# Patient Record
Sex: FEMALE | Race: ASIAN | NOT HISPANIC OR LATINO | Employment: OTHER | URBAN - METROPOLITAN AREA
[De-identification: names, ages, dates, MRNs, and addresses within clinical notes are randomized per-mention and may not be internally consistent; named-entity substitution may affect disease eponyms.]

---

## 2017-02-20 ENCOUNTER — ONCOLOGY VISIT (OUTPATIENT)
Dept: ONCOLOGY | Facility: CLINIC | Age: 67
End: 2017-02-20
Attending: INTERNAL MEDICINE
Payer: COMMERCIAL

## 2017-02-20 ENCOUNTER — APPOINTMENT (OUTPATIENT)
Dept: LAB | Facility: CLINIC | Age: 67
End: 2017-02-20
Attending: INTERNAL MEDICINE
Payer: COMMERCIAL

## 2017-02-20 VITALS
WEIGHT: 140.8 LBS | DIASTOLIC BLOOD PRESSURE: 65 MMHG | SYSTOLIC BLOOD PRESSURE: 99 MMHG | TEMPERATURE: 97.6 F | HEART RATE: 60 BPM | OXYGEN SATURATION: 98 % | BODY MASS INDEX: 28.44 KG/M2 | RESPIRATION RATE: 18 BRPM

## 2017-02-20 DIAGNOSIS — C83.30 DLBCL (DIFFUSE LARGE B CELL LYMPHOMA) (H): ICD-10-CM

## 2017-02-20 LAB
ALBUMIN SERPL-MCNC: 3.6 G/DL (ref 3.4–5)
ALP SERPL-CCNC: 94 U/L (ref 40–150)
ALT SERPL W P-5'-P-CCNC: 28 U/L (ref 0–50)
ANION GAP SERPL CALCULATED.3IONS-SCNC: 12 MMOL/L (ref 3–14)
AST SERPL W P-5'-P-CCNC: 19 U/L (ref 0–45)
BASOPHILS # BLD AUTO: 0 10E9/L (ref 0–0.2)
BASOPHILS NFR BLD AUTO: 0.4 %
BILIRUB SERPL-MCNC: 1 MG/DL (ref 0.2–1.3)
BUN SERPL-MCNC: 16 MG/DL (ref 7–30)
CALCIUM SERPL-MCNC: 8.2 MG/DL (ref 8.5–10.1)
CHLORIDE SERPL-SCNC: 110 MMOL/L (ref 94–109)
CO2 SERPL-SCNC: 21 MMOL/L (ref 20–32)
CREAT SERPL-MCNC: 0.81 MG/DL (ref 0.52–1.04)
DIFFERENTIAL METHOD BLD: NORMAL
EOSINOPHIL # BLD AUTO: 0.1 10E9/L (ref 0–0.7)
EOSINOPHIL NFR BLD AUTO: 1.7 %
ERYTHROCYTE [DISTWIDTH] IN BLOOD BY AUTOMATED COUNT: 12.8 % (ref 10–15)
GFR SERPL CREATININE-BSD FRML MDRD: 71 ML/MIN/1.7M2
GLUCOSE SERPL-MCNC: 248 MG/DL (ref 70–99)
HCT VFR BLD AUTO: 39.9 % (ref 35–47)
HGB BLD-MCNC: 12.9 G/DL (ref 11.7–15.7)
IMM GRANULOCYTES # BLD: 0 10E9/L (ref 0–0.4)
IMM GRANULOCYTES NFR BLD: 0.2 %
LDH SERPL L TO P-CCNC: 164 U/L (ref 81–234)
LYMPHOCYTES # BLD AUTO: 2.2 10E9/L (ref 0.8–5.3)
LYMPHOCYTES NFR BLD AUTO: 41 %
MCH RBC QN AUTO: 26.8 PG (ref 26.5–33)
MCHC RBC AUTO-ENTMCNC: 32.3 G/DL (ref 31.5–36.5)
MCV RBC AUTO: 83 FL (ref 78–100)
MONOCYTES # BLD AUTO: 0.3 10E9/L (ref 0–1.3)
MONOCYTES NFR BLD AUTO: 5.5 %
NEUTROPHILS # BLD AUTO: 2.7 10E9/L (ref 1.6–8.3)
NEUTROPHILS NFR BLD AUTO: 51.2 %
NRBC # BLD AUTO: 0 10*3/UL
NRBC BLD AUTO-RTO: 0 /100
PLATELET # BLD AUTO: 199 10E9/L (ref 150–450)
POTASSIUM SERPL-SCNC: 3.3 MMOL/L (ref 3.4–5.3)
PROT SERPL-MCNC: 6.7 G/DL (ref 6.8–8.8)
RBC # BLD AUTO: 4.82 10E12/L (ref 3.8–5.2)
SODIUM SERPL-SCNC: 143 MMOL/L (ref 133–144)
WBC # BLD AUTO: 5.3 10E9/L (ref 4–11)

## 2017-02-20 PROCEDURE — 00000402 ZZHCL STATISTIC TOTAL PROTEIN: Performed by: INTERNAL MEDICINE

## 2017-02-20 PROCEDURE — 36415 COLL VENOUS BLD VENIPUNCTURE: CPT

## 2017-02-20 PROCEDURE — 99212 OFFICE O/P EST SF 10 MIN: CPT

## 2017-02-20 PROCEDURE — 83615 LACTATE (LD) (LDH) ENZYME: CPT | Performed by: INTERNAL MEDICINE

## 2017-02-20 PROCEDURE — 84165 PROTEIN E-PHORESIS SERUM: CPT | Performed by: INTERNAL MEDICINE

## 2017-02-20 PROCEDURE — 80053 COMPREHEN METABOLIC PANEL: CPT | Performed by: INTERNAL MEDICINE

## 2017-02-20 PROCEDURE — 85025 COMPLETE CBC W/AUTO DIFF WBC: CPT | Performed by: INTERNAL MEDICINE

## 2017-02-20 PROCEDURE — 99213 OFFICE O/P EST LOW 20 MIN: CPT | Mod: GC | Performed by: INTERNAL MEDICINE

## 2017-02-20 ASSESSMENT — PAIN SCALES - GENERAL: PAINLEVEL: EXTREME PAIN (9)

## 2017-02-20 NOTE — LETTER
2/20/2017       RE: Simran Negro  [address]   [facility] [address]     Dear Colleague,    Thank you for referring your patient, Simran Negro, to the [facility]. Please see a copy of my visit note below.    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   Date of Service: 2/20/2017    HISTORY OF PRESENT ILLNESS:  Ms. Gavin is a 66 year old [facility] female here for follow up of CNS relapse of DLBCL.  She was seen with a [facility]  today.  To briefly summarize her course, she was diagnosed with DLBCL of the cervix in 06/2007 and was treated with 6 cycles of R-CHOP.  She attained a CR, but in early 2014 developed difficulty concentrating, gait instability, and right-sided headaches with some blurry vision.  Workup revealed an intra-axial right parietal mass.  Stereotactic biopsy on 03/19/2014 which was diagnostic for diffuse large B-cell lymphoma, non-germinal center type.  Immunophenotypically, this was similar to her previous diffuse large B-cell lymphoma, and this was presumed CNS relapse.  She was started on MT-R (methotrexate, temozolomide and rituximab) therapy on 3/25/2014.  Her course was complicated by line-related DVT, and she completed a course of enoxaparin for this.  She attained a complete clinical and radiographic response after completing the MT-R treatment regimen and EA consolidation in 8/2014.  She is here for follow up.    Interim Hx:  Ms. Negro reports very depressed as her mother recently passed away a few weeks ago. She had plane ticket to go see her before she passed, but was unable to get visa so could not go. She feels very sad about the loss. At times she has felt like she has wanted to be dead, but has a lot of support from her children who are watching her closely. She denies any active suicidal ideation. Dr. Flor has been very actively involved in supporting her, including starting antidepressant and calling her  frequently.  She has an appointment on Wednesday with his office. She reports not having much of an appetite for eating or drinking and not sleeping well. She denies fevers/chills, weight changes, sweats, new lumps, headaches, vision changes, weakness or numbness. She reports some right sided lower jaw pain that started after her mother .     REVIEW OF SYSTEMS:  A complete 10-point review of systems was entirely negative other than noted.    MEDICATIONS:  Current Outpatient Prescriptions   Medication     Acetaminophen (TYLENOL PO)     CYANOCOBALAMIN PO     Skin Protectants, Misc. (CRITIC-AID CLEAR) OINT     ORDER FOR DME     No current facility-administered medications for this visit.      PHYSICAL EXAMINATION:  BP 99/65 (BP Location: Right arm, Patient Position: Chair, Cuff Size: Adult Regular)  Pulse 60  Temp 97.6  F (36.4  C) (Oral)  Resp 18  Wt 63.9 kg (140 lb 12.8 oz)  SpO2 98%  BMI 28.44 kg/m2  Wt Readings from Last 5 Encounters:   17 63.9 kg (140 lb 12.8 oz)   16 63.5 kg (140 lb)   16 66.1 kg (145 lb 11.6 oz)   02/15/16 64.7 kg (142 lb 10.2 oz)   11/09/15 65 kg (143 lb 4.8 oz)     General:  female, appears sad and intermittently tearful   HEENT: Sclerae anicteric. No OP lesions, masses, or tonsilar enlargement. Fair dentition, No pain on palpation of lower jaw.   Lungs: Clear bilaterally without wheezing or crackles.  Heart: Regular rate and rhythm without murmurs.  Abd: Bowel sounds present, no tenderness to palpation, spleen tip not palpable.   Extremities: No lower extremity edema.  Lymph:  No cervical, clavicular, axillary, epitrochlear, or inguinal lymphadenopathy.  Neuro: AOx3, CN II-XII intact, Strength 5/5 in all extremities   Access: port site c/d/i.  Peformance status: ECOG 1    LABORATORY DATA:  Recent Labs   Lab Test  17   0927  16   0915  16   0921   14   0715  14   0552  14   0734   WBC  5.3  5.4  5.5   < >  4.6  6.5  8.0   HGB  12.9   13.6  12.8   < >  10.6*  10.1*  10.2*   PLT  199  174  182   < >  131*  78*  58*   ANEU  2.7  3.0  2.6   < >  3.2  4.8  6.6   ABLA   --    --    --    --   0.0  0.1*  0.3*   ALYM  2.2  2.0  2.4   < >  0.5*  0.5*  0.2*    < > = values in this interval not displayed.     Recent Labs   Lab Test  02/20/17   0927  08/22/16   0915  05/13/16   0921   09/09/14   0825  09/05/14   0715   08/29/14   0113   08/28/14   1230   07/28/14   1007   06/30/14   0955   06/20/14   0437   NA  143  141  142   < >  140  143   < >   --    --    --    < >  141   < >  144   < >  143   POTASSIUM  3.3*  3.6  3.4   < >  3.9  3.8   < >   --    < >   --    < >  3.4   < >  3.2*   < >  3.6   CHLORIDE  110*  104  108   < >  105  109   < >   --    --    --    < >  105   < >  104   < >  101   CO2  21  25  24   < >  26  27   < >   --    --    --    < >  25   < >  24   < >  35*   BUN  16  15  18   < >  10  3*   < >   --    --    --    < >  13   < >  6*   < >  3*   CR  0.81  0.74  0.85   < >  0.76  0.64   < >   --    --    --    < >  0.70   < >  0.85   < >  0.82   EMMA  8.2*  8.9  8.5   < >  8.6  8.3*   < >   --    --    --    < >  9.5   < >  9.4   < >  8.3*   MAG   --    --    --    --   1.9  1.6   --    --    --   2.0   < >   --    < >   --    --    --    PHOS   --    --    --    --   4.4  2.1*   --   2.9   < >   --    < >   --    < >   --    --    --    URIC   --    --    --    --    --    --    --    --    --    --    --   6.7   --   7.1   --   3.6   LDH  164  157  162   < >   --    --    --    --    --    --    < >  447   < >  460   --   366    < > = values in this interval not displayed.     Recent Labs   Lab Test  02/20/17   0927  09/14/16   1208  08/22/16   0915  05/13/16   0921   08/28/14   0415  08/27/14   0627   AST  19   --   18  22   < >  7   --    ALT  28   --   32  33   < >  12   --    ALKPHOS  94   --   86  105   < >  96   --    BILITOTAL  1.0   --   1.3  1.1   < >  0.8   --    INR   --   1.03   --    --    --   1.22*  1.22*    < > = values  in this interval not displayed.     Recent Labs   Lab Test  02/20/17   0927  08/22/16   0915  05/13/16   0921   01/12/15   1136  03/31/14   0921   IGG   --    --    --    --   582*  744   IGA   --    --    --    --   100  153   IGM   --    --    --    --   54*  172   ELPM  Pending  0.0  0.0   < >  0.0  0.1*    < > = values in this interval not displayed.     IMPRESSION AND PLAN:  Ms. Gavin is a 66-year-old woman with past CNS relapse of DLBCL, here for followup.      In regards to her CNS relapse of DLBCL, she clinically remains in remission. She is currently grieving loss of her mother, who passed just a few weeks ago. Her last MRI in 05/2016 showed no evidence of active lymphoma.  We will plan to repeat an MRI in 6 months.  We discussed the importance that she notify us if any new issues come up in between visits.      She has no active infectious issues.  She is up-to-date for immunizations.  She will be next due for repeat Pneumovax in 05/2021.       She will continue to work with Dr. Flor for routine health care issues and managing her bereavement/depression. She is high risk for severe depression but has good support system with her family.  She will let us know if there is anything that we can do to help, but she declined talking with a mental health provider at our clinic today.     We will plan to see her back in 6 months with labs and MRI brain, and reminded her to call in the interim if questions, concerns or new issues arise.     Pt seen and discussed with Dr. Dailey who agrees with plan.     Annalise Roa MD  Hematology Oncology fellow  [phone]    ATTENDING ADDENDUM:  The patient has been seen and evaluated by me.  I have reviewed today's vital signs, medications, labs, and imaging results independently, and have discussed the patient and plan with the fellow, and agree with the findings and plan outlined in this note.  The impression and plan were jointly made.    Karson Dailey MD, PhD  Assistant  Professor of Medicine  Division of Hematology, Oncology, and Transplantation  [email or web address] email  [phone] office  [phone] pager

## 2017-02-20 NOTE — NURSING NOTE
Chief Complaint   Patient presents with     Oncology Clinic Visit     CNS Lymphoma     Blood Draw     Labs drawn by RN from VPT. VS taken.      Labs drawn from R hand.   Terri Benitez RN

## 2017-02-20 NOTE — MR AVS SNAPSHOT
After Visit Summary   2/20/2017    Simran Negro    MRN: [id number]           Patient Information     Date Of Birth          1950        Visit Information        Provider Department      2/20/2017 8:45 AM Karson Dailey MD; [facility]        Today's Diagnoses     DLBCL (diffuse large B cell lymphoma) (H)           Follow-ups after your visit        Your next 10 appointments already scheduled     May 22, 2017  7:45 AM CDT   (Arrive by 7:30 AM)   MR BRAIN W/O & W CONTRAST with [id number]   [facility] ([facility])    [address]  [facility] [address] [phone]           Take your medicines as usual, unless your doctor tells you not to. Bring a list of your current medicines to your exam (including vitamins, minerals and over-the-counter drugs).  You will be given intravenous contrast for this exam. To prepare:   The day before your exam, drink extra fluids at least six 8-ounce glasses (unless your doctor tells you to restrict your fluids).   Have a blood test (creatinine test) within 30 days of your exam. Go to your clinic or Diagnostic Imaging Department for this test.  The MRI machine uses a strong magnet. Please wear clothes without metal (snaps, zippers). A sweatsuit works well, or we may give you a hospital gown.  Please remove any body piercings and hair extensions before you arrive. You will also remove watches, jewelry, hairpins, wallets, dentures, partial dental plates and hearing aids. You may wear contact lenses, and you may be able to wear your rings. We have a safe place to keep your personal items, but it is safer to leave them at home.   **IMPORTANT** THE INSTRUCTIONS BELOW ARE ONLY FOR THOSE PATIENTS WHO HAVE BEEN TOLD THEY WILL RECEIVE SEDATION OR GENERAL ANESTHESIA DURING THEIR MRI PROCEDURE:  IF YOU WILL RECEIVE SEDATION (take medicine to help you relax during  your exam):   You must get the medicine from your doctor before you arrive. Bring the medicine to the exam. Do not take it at home.   Arrive one hour early. Bring someone who can take you home after the test. Your medicine will make you sleepy. After the exam, you may not drive, take a bus or take a taxi by yourself.   No eating 8 hours before your exam. You may have clear liquids up until 4 hours before your exam. (Clear liquids include water, clear tea, black coffee and fruit juice without pulp.)  IF YOU WILL RECEIVE ANESTHESIA (be asleep for your exam):   Arrive 1 1/2 hours early. Bring someone who can take you home after the test. You may not drive, take a bus or take a taxi by yourself.   No eating 8 hours before your exam. You may have clear liquids up until 4 hours before your exam. (Clear liquids include water, clear tea, black coffee and fruit juice without pulp.)  Please call the Imaging Department at your exam site with any questions.            May 22, 2017  8:30 AM CDT   Lab with  LAB   [facility] ([facility])    [address] [place] [address] [phone]            May 22, 2017  9:30 AM CDT   (Arrive by 9:15 AM)   Return Visit with Karson Dailey MD   [facility] ([facility])    [address] [place] [address] [phone]              Future tests that were ordered for you today     Open Future Orders        Priority Expected Expires Ordered    MR Brain w/o & w Contrast Routine 8/20/2017 2/20/2018 2/20/2017    CBC with platelets differential Routine 8/20/2017 2/20/2018 2/20/2017    Comprehensive metabolic panel Routine 8/20/2017 2/20/2018 2/20/2017    Lactate Dehydrogenase Routine 8/20/2017 2/20/2018 2/20/2017    Protein electrophoresis Routine 8/20/2017 2/20/2018 2/20/2017            Who to contact     If you have questions or need follow up information about  "today's clinic visit or your schedule please contact [facility] directly at [phone].  Normal or non-critical lab and imaging results will be communicated to you by [employer], letter or phone within 4 business days after the clinic has received the results. If you do not hear from us within 7 days, please contact the clinic through [employer] or phone. If you have a critical or abnormal lab result, we will notify you by phone as soon as possible.  Submit refill requests through [employer] or call your pharmacy and they will forward the refill request to us. Please allow 3 business days for your refill to be completed.          Additional Information About Your Visit        [employer] Information     [employer] lets you send messages to your doctor, view your test results, renew your prescriptions, schedule appointments and more. To sign up, go to [email or web address] . Click on \"Log in\" on the left side of the screen, which will take you to the Welcome page. Then click on \"Sign up Now\" on the right side of the page.     You will be asked to enter the access code listed below, as well as some personal information. Please follow the directions to create your username and password.     Your access code is: [email or web address]  Expires: 2017  6:30 AM     Your access code will  in 90 days. If you need help or a new code, please call your [facility] clinic or [phone].        Care EveryWhere ID     This is your Care EveryWhere ID. This could be used by other organizations to access your [facility] medical records  [id number]        Your Vitals Were     Pulse Temperature Respirations Pulse Oximetry BMI (Body Mass Index)       60 97.6  F (36.4  C) (Oral) 18 98% 28.44 kg/m2        Blood Pressure from Last 3 Encounters:   17 99/65   16 116/70   16 113/74    Weight from Last 3 Encounters:   17 63.9 kg (140 lb 12.8 oz)   16 63.5 kg (140 lb)   16 66.1 kg (145 lb 11.6 oz)    "           We Performed the Following     CBC with platelets differential     Comprehensive metabolic panel     Lactate Dehydrogenase     Protein electrophoresis        Primary Care Provider Office Phone # Fax #    Ari Flor [phone]       [facility] [address] [facility] [address]        Thank you!     Thank you for choosing [facility]  for your care. Our goal is always to provide you with excellent care. Hearing back from our patients is one way we can continue to improve our services. Please take a few minutes to complete the written survey that you may receive in the mail after your visit with us. Thank you!             Your Updated Medication List - Protect others around you: Learn how to safely use, store and throw away your medicines at www.disposemymeds.org.          This list is accurate as of: 2/20/17 10:27 AM.  Always use your most recent med list.                   Brand Name Dispense Instructions for use    CRITIC-AID CLEAR Oint     1 Tube    Externally apply 1 Application topically 2 times daily       CYANOCOBALAMIN PO      Take 500 mcg by mouth daily       order for DME     90 Units    Please dispense to take 3 Ensure/day.       TYLENOL PO      Take 325 mg by mouth

## 2017-02-20 NOTE — NURSING NOTE
"Simran Gavin is a 67 year old female who presents for:  Chief Complaint   Patient presents with     Oncology Clinic Visit     CNS Lymphoma        Initial Vitals:  BP 99/65 (BP Location: Right arm, Patient Position: Chair, Cuff Size: Adult Regular)  Pulse 60  Temp 97.6  F (36.4  C) (Oral)  Resp 18  Wt 63.9 kg (140 lb 12.8 oz)  SpO2 98%  BMI 28.44 kg/m2 Estimated body mass index is 28.44 kg/(m^2) as calculated from the following:    Height as of 8/22/16: 1.499 m (4' 11\").    Weight as of this encounter: 63.9 kg (140 lb 12.8 oz).. Body surface area is 1.63 meters squared. BP completed using cuff size: NA (Not Taken)  Extreme Pain (9) No LMP recorded. Patient is postmenopausal. Allergies and medications reviewed.     Medications: MEDICATION REFILLS NEEDED TODAY.  Pharmacy name entered into [employer]:    [facility] - [place], [place] - [address] [facility] (USE ONLY AT [facility]) - [place], [place] - [address]    Comments: Patient is at a pain level of 9 today, neck pain.     6 minutes for nursing intake (face to face time)   Angeline Davidson CMA       "

## 2017-02-20 NOTE — PROGRESS NOTES
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   Date of Service: 2/20/2017    HISTORY OF PRESENT ILLNESS:  Ms. Gavin is a 66 year old [facility] female here for follow up of CNS relapse of DLBCL.  She was seen with a [facility]  today.  To briefly summarize her course, she was diagnosed with DLBCL of the cervix in 06/2007 and was treated with 6 cycles of R-CHOP.  She attained a CR, but in early 2014 developed difficulty concentrating, gait instability, and right-sided headaches with some blurry vision.  Workup revealed an intra-axial right parietal mass.  Stereotactic biopsy on 03/19/2014 which was diagnostic for diffuse large B-cell lymphoma, non-germinal center type.  Immunophenotypically, this was similar to her previous diffuse large B-cell lymphoma, and this was presumed CNS relapse.  She was started on MT-R (methotrexate, temozolomide and rituximab) therapy on 3/25/2014.  Her course was complicated by line-related DVT, and she completed a course of enoxaparin for this.  She attained a complete clinical and radiographic response after completing the MT-R treatment regimen and EA consolidation in 8/2014.  She is here for follow up.    Interim Hx:  Ms. Negro reports very depressed as her mother recently passed away a few weeks ago. She had plane ticket to go see her before she passed, but was unable to get visa so could not go. She feels very sad about the loss. At times she has felt like she has wanted to be dead, but has a lot of support from her children who are watching her closely. She denies any active suicidal ideation. Dr. Flor has been very actively involved in supporting her, including starting antidepressant and calling her frequently.  She has an appointment on Wednesday with his office. She reports not having much of an appetite for eating or drinking and not sleeping well. She denies fevers/chills, weight changes, sweats, new lumps, headaches, vision  changes, weakness or numbness. She reports some right sided lower jaw pain that started after her mother .     REVIEW OF SYSTEMS:  A complete 10-point review of systems was entirely negative other than noted.    MEDICATIONS:  Current Outpatient Prescriptions   Medication     Acetaminophen (TYLENOL PO)     CYANOCOBALAMIN PO     Skin Protectants, Misc. (CRITIC-AID CLEAR) OINT     ORDER FOR DME     No current facility-administered medications for this visit.      PHYSICAL EXAMINATION:  BP 99/65 (BP Location: Right arm, Patient Position: Chair, Cuff Size: Adult Regular)  Pulse 60  Temp 97.6  F (36.4  C) (Oral)  Resp 18  Wt 63.9 kg (140 lb 12.8 oz)  SpO2 98%  BMI 28.44 kg/m2  Wt Readings from Last 5 Encounters:   17 63.9 kg (140 lb 12.8 oz)   16 63.5 kg (140 lb)   16 66.1 kg (145 lb 11.6 oz)   02/15/16 64.7 kg (142 lb 10.2 oz)   11/09/15 65 kg (143 lb 4.8 oz)     General:  female, appears sad and intermittently tearful   HEENT: Sclerae anicteric. No OP lesions, masses, or tonsilar enlargement. Fair dentition, No pain on palpation of lower jaw.   Lungs: Clear bilaterally without wheezing or crackles.  Heart: Regular rate and rhythm without murmurs.  Abd: Bowel sounds present, no tenderness to palpation, spleen tip not palpable.   Extremities: No lower extremity edema.  Lymph:  No cervical, clavicular, axillary, epitrochlear, or inguinal lymphadenopathy.  Neuro: AOx3, CN II-XII intact, Strength 5/5 in all extremities   Access: port site c/d/i.  Peformance status: ECOG 1    LABORATORY DATA:  Recent Labs   Lab Test  17   0927  16   0915  16   0921   14   0715  14   0552  14   0734   WBC  5.3  5.4  5.5   < >  4.6  6.5  8.0   HGB  12.9  13.6  12.8   < >  10.6*  10.1*  10.2*   PLT  199  174  182   < >  131*  78*  58*   ANEU  2.7  3.0  2.6   < >  3.2  4.8  6.6   ABLA   --    --    --    --   0.0  0.1*  0.3*   ALYM  2.2  2.0  2.4   < >  0.5*  0.5*  0.2*    < > =  values in this interval not displayed.     Recent Labs   Lab Test  02/20/17 0927 08/22/16   0915  05/13/16   0921   09/09/14   0825  09/05/14   0715   08/29/14   0113   08/28/14   1230   07/28/14   1007   06/30/14   0955   06/20/14   0437   NA  143  141  142   < >  140  143   < >   --    --    --    < >  141   < >  144   < >  143   POTASSIUM  3.3*  3.6  3.4   < >  3.9  3.8   < >   --    < >   --    < >  3.4   < >  3.2*   < >  3.6   CHLORIDE  110*  104  108   < >  105  109   < >   --    --    --    < >  105   < >  104   < >  101   CO2  21  25  24   < >  26  27   < >   --    --    --    < >  25   < >  24   < >  35*   BUN  16  15  18   < >  10  3*   < >   --    --    --    < >  13   < >  6*   < >  3*   CR  0.81  0.74  0.85   < >  0.76  0.64   < >   --    --    --    < >  0.70   < >  0.85   < >  0.82   EMMA  8.2*  8.9  8.5   < >  8.6  8.3*   < >   --    --    --    < >  9.5   < >  9.4   < >  8.3*   MAG   --    --    --    --   1.9  1.6   --    --    --   2.0   < >   --    < >   --    --    --    PHOS   --    --    --    --   4.4  2.1*   --   2.9   < >   --    < >   --    < >   --    --    --    URIC   --    --    --    --    --    --    --    --    --    --    --   6.7   --   7.1   --   3.6   LDH  164  157  162   < >   --    --    --    --    --    --    < >  447   < >  460   --   366    < > = values in this interval not displayed.     Recent Labs   Lab Test  02/20/17   0927 09/14/16   1208  08/22/16   0915  05/13/16   0921   08/28/14   0415  08/27/14   0627   AST  19   --   18  22   < >  7   --    ALT  28   --   32  33   < >  12   --    ALKPHOS  94   --   86  105   < >  96   --    BILITOTAL  1.0   --   1.3  1.1   < >  0.8   --    INR   --   1.03   --    --    --   1.22*  1.22*    < > = values in this interval not displayed.     Recent Labs   Lab Test  02/20/17   0927 08/22/16   0915  05/13/16   0921   01/12/15   1136  03/31/14   0921   IGG   --    --    --    --   582*  744   IGA   --    --    --    --   100  153    IGM   --    --    --    --   54*  172   ELPM  Pending  0.0  0.0   < >  0.0  0.1*    < > = values in this interval not displayed.     IMPRESSION AND PLAN:  Ms. Gavin is a 66-year-old woman with past CNS relapse of DLBCL, here for followup.      In regards to her CNS relapse of DLBCL, she clinically remains in remission. She is currently grieving loss of her mother, who passed just a few weeks ago. Her last MRI in 05/2016 showed no evidence of active lymphoma.  We will plan to repeat an MRI in 6 months.  We discussed the importance that she notify us if any new issues come up in between visits.      She has no active infectious issues.  She is up-to-date for immunizations.  She will be next due for repeat Pneumovax in 05/2021.       She will continue to work with Dr. Flor for routine health care issues and managing her bereavement/depression. She is high risk for severe depression but has good support system with her family.  She will let us know if there is anything that we can do to help, but she declined talking with a mental health provider at our clinic today.     We will plan to see her back in 6 months with labs and MRI brain, and reminded her to call in the interim if questions, concerns or new issues arise.     Pt seen and discussed with Dr. Dailey who agrees with plan.     Annalise Roa MD  Hematology Oncology fellow  [phone]    ATTENDING ADDENDUM:  The patient has been seen and evaluated by me.  I have reviewed today's vital signs, medications, labs, and imaging results independently, and have discussed the patient and plan with the fellow, and agree with the findings and plan outlined in this note.  The impression and plan were jointly made.    Karson Dailey MD, PhD   of Medicine  Division of Hematology, Oncology, and Transplantation  [email or web address] email  [phone] office  [phone] pager

## 2017-02-21 LAB
ALBUMIN SERPL ELPH-MCNC: 4 G/DL (ref 3.7–5.1)
ALPHA1 GLOB SERPL ELPH-MCNC: 0.2 G/DL (ref 0.2–0.4)
ALPHA2 GLOB SERPL ELPH-MCNC: 0.7 G/DL (ref 0.5–0.9)
B-GLOBULIN SERPL ELPH-MCNC: 0.7 G/DL (ref 0.6–1)
GAMMA GLOB SERPL ELPH-MCNC: 0.8 G/DL (ref 0.7–1.6)
M PROTEIN SERPL ELPH-MCNC: 0 G/DL
PROT PATTERN SERPL ELPH-IMP: NORMAL

## 2017-08-21 ENCOUNTER — ONCOLOGY VISIT (OUTPATIENT)
Dept: ONCOLOGY | Facility: CLINIC | Age: 67
End: 2017-08-21
Attending: INTERNAL MEDICINE
Payer: MEDICARE

## 2017-08-21 VITALS
RESPIRATION RATE: 16 BRPM | SYSTOLIC BLOOD PRESSURE: 119 MMHG | WEIGHT: 137.57 LBS | OXYGEN SATURATION: 99 % | HEART RATE: 58 BPM | TEMPERATURE: 97.1 F | DIASTOLIC BLOOD PRESSURE: 70 MMHG | BODY MASS INDEX: 27.79 KG/M2

## 2017-08-21 DIAGNOSIS — C83.30 DLBCL (DIFFUSE LARGE B CELL LYMPHOMA) (H): ICD-10-CM

## 2017-08-21 DIAGNOSIS — R07.0 THROAT PAIN: ICD-10-CM

## 2017-08-21 DIAGNOSIS — C83.30 DLBCL (DIFFUSE LARGE B CELL LYMPHOMA) (H): Primary | ICD-10-CM

## 2017-08-21 LAB
ALBUMIN SERPL-MCNC: 3.3 G/DL (ref 3.4–5)
ALP SERPL-CCNC: 90 U/L (ref 40–150)
ALT SERPL W P-5'-P-CCNC: 26 U/L (ref 0–50)
ANION GAP SERPL CALCULATED.3IONS-SCNC: 7 MMOL/L (ref 3–14)
AST SERPL W P-5'-P-CCNC: 17 U/L (ref 0–45)
BASOPHILS # BLD AUTO: 0 10E9/L (ref 0–0.2)
BASOPHILS NFR BLD AUTO: 0.4 %
BILIRUB SERPL-MCNC: 0.8 MG/DL (ref 0.2–1.3)
BUN SERPL-MCNC: 18 MG/DL (ref 7–30)
CALCIUM SERPL-MCNC: 8.6 MG/DL (ref 8.5–10.1)
CHLORIDE SERPL-SCNC: 107 MMOL/L (ref 94–109)
CO2 SERPL-SCNC: 26 MMOL/L (ref 20–32)
CREAT SERPL-MCNC: 0.93 MG/DL (ref 0.52–1.04)
DIFFERENTIAL METHOD BLD: NORMAL
EOSINOPHIL # BLD AUTO: 0.1 10E9/L (ref 0–0.7)
EOSINOPHIL NFR BLD AUTO: 1.6 %
ERYTHROCYTE [DISTWIDTH] IN BLOOD BY AUTOMATED COUNT: 12.3 % (ref 10–15)
GFR SERPL CREATININE-BSD FRML MDRD: 60 ML/MIN/1.7M2
GLUCOSE SERPL-MCNC: 122 MG/DL (ref 70–99)
HCT VFR BLD AUTO: 38.4 % (ref 35–47)
HGB BLD-MCNC: 12.3 G/DL (ref 11.7–15.7)
IMM GRANULOCYTES # BLD: 0 10E9/L (ref 0–0.4)
IMM GRANULOCYTES NFR BLD: 0.4 %
LDH SERPL L TO P-CCNC: 152 U/L (ref 81–234)
LYMPHOCYTES # BLD AUTO: 1.7 10E9/L (ref 0.8–5.3)
LYMPHOCYTES NFR BLD AUTO: 24.4 %
MCH RBC QN AUTO: 27.3 PG (ref 26.5–33)
MCHC RBC AUTO-ENTMCNC: 32 G/DL (ref 31.5–36.5)
MCV RBC AUTO: 85 FL (ref 78–100)
MONOCYTES # BLD AUTO: 0.5 10E9/L (ref 0–1.3)
MONOCYTES NFR BLD AUTO: 6.7 %
NEUTROPHILS # BLD AUTO: 4.6 10E9/L (ref 1.6–8.3)
NEUTROPHILS NFR BLD AUTO: 66.5 %
NRBC # BLD AUTO: 0 10*3/UL
NRBC BLD AUTO-RTO: 0 /100
PLATELET # BLD AUTO: 193 10E9/L (ref 150–450)
POTASSIUM SERPL-SCNC: 3.6 MMOL/L (ref 3.4–5.3)
PROT SERPL-MCNC: 7 G/DL (ref 6.8–8.8)
RBC # BLD AUTO: 4.51 10E12/L (ref 3.8–5.2)
SODIUM SERPL-SCNC: 141 MMOL/L (ref 133–144)
WBC # BLD AUTO: 7 10E9/L (ref 4–11)

## 2017-08-21 PROCEDURE — 85025 COMPLETE CBC W/AUTO DIFF WBC: CPT | Performed by: INTERNAL MEDICINE

## 2017-08-21 PROCEDURE — 36415 COLL VENOUS BLD VENIPUNCTURE: CPT | Performed by: INTERNAL MEDICINE

## 2017-08-21 PROCEDURE — 99213 OFFICE O/P EST LOW 20 MIN: CPT | Mod: 25 | Performed by: INTERNAL MEDICINE

## 2017-08-21 PROCEDURE — 99212 OFFICE O/P EST SF 10 MIN: CPT | Mod: ZF

## 2017-08-21 PROCEDURE — 83615 LACTATE (LD) (LDH) ENZYME: CPT | Performed by: INTERNAL MEDICINE

## 2017-08-21 PROCEDURE — 00000402 ZZHCL STATISTIC TOTAL PROTEIN: Performed by: INTERNAL MEDICINE

## 2017-08-21 PROCEDURE — 84165 PROTEIN E-PHORESIS SERUM: CPT | Performed by: INTERNAL MEDICINE

## 2017-08-21 PROCEDURE — 80053 COMPREHEN METABOLIC PANEL: CPT | Performed by: INTERNAL MEDICINE

## 2017-08-21 ASSESSMENT — PAIN SCALES - GENERAL: PAINLEVEL: WORST PAIN (10)

## 2017-08-21 NOTE — MR AVS SNAPSHOT
"              After Visit Summary   8/21/2017    Simran Negro    MRN: [id number]           Patient Information     Date Of Birth          1950        Visit Information        Provider Department      8/21/2017 8:45 AM Karson Dailey MD; [facility] [employer]        Today's Diagnoses     DLBCL (diffuse large B cell lymphoma) (H)    -  1    Throat pain           Follow-ups after your visit        Future tests that were ordered for you today     Open Future Orders        Priority Expected Expires Ordered    CBC with platelets differential Routine 2/21/2018 8/21/2018 8/21/2017    Comprehensive metabolic panel Routine 2/21/2018 8/21/2018 8/21/2017    Lactate Dehydrogenase Routine 2/21/2018 8/21/2018 8/21/2017    Protein electrophoresis Routine 2/21/2018 8/21/2018 8/21/2017            Who to contact     If you have questions or need follow up information about today's clinic visit or your schedule please contact [facility] directly at [phone].  Normal or non-critical lab and imaging results will be communicated to you by PodPonicshart, letter or phone within 4 business days after the clinic has received the results. If you do not hear from us within 7 days, please contact the clinic through [employer] or phone. If you have a critical or abnormal lab result, we will notify you by phone as soon as possible.  Submit refill requests through H[employer]Vue or call your pharmacy and they will forward the refill request to us. Please allow 3 business days for your refill to be completed.          Additional Information About Your Visit        PodPonicshart Information     H[employer]Vue lets you send messages to your doctor, view your test results, renew your prescriptions, schedule appointments and more. To sign up, go to [email or web address] . Click on \"Log in\" on the left side of the screen, which will take you to the Welcome page. Then click on \"Sign up Now\" on the right side of " the page.     You will be asked to enter the access code listed below, as well as some personal information. Please follow the directions to create your username and password.     Your access code is: [email or web address]  Expires: 2017  6:31 AM     Your access code will  in 90 days. If you need help or a new code, please call your [place] clinic or [phone].        Care EveryWhere ID     This is your Care EveryWhere ID. This could be used by other organizations to access your [place] medical records  [id number]        Your Vitals Were     Pulse Temperature Respirations Pulse Oximetry BMI (Body Mass Index)       58 97.1  F (36.2  C) (Oral) 16 99% 27.79 kg/m2        Blood Pressure from Last 3 Encounters:   17 119/70   17 99/65   16 116/70    Weight from Last 3 Encounters:   17 62.4 kg (137 lb 9.1 oz)   17 63.9 kg (140 lb 12.8 oz)   16 63.5 kg (140 lb)               Primary Care Provider Office Phone # Fax #    Ari Flor [phone]       [facility] [address] [address]        Equal Access to Services     COLLINS GONZALEZ AH: Hadii lucio ku hadasho Soomaali, waaxda luqadaha, qaybta kaalmada adeegyada, antonio idiin hayotis lam . So [facility] [phone].    ATENCIÓN: Si habla español, tiene a johnson disposición servicios gratuitos de asistencia lingüística. Llame al [phone].    We comply with applicable federal civil rights laws and [place] laws. We do not discriminate on the basis of race, color, national origin, age, disability sex, sexual orientation or gender identity.            Thank you!     Thank you for choosing [facility]  for your care. Our goal is always to provide you with excellent care. Hearing back from our patients is one way we can continue to improve our services. Please take a few minutes to complete the written survey that you may receive in the mail after your visit with  us. Thank you!             Your Updated Medication List - Protect others around you: Learn how to safely use, store and throw away your medicines at www.disposemymeds.org.          This list is accurate as of: 8/21/17  5:39 PM.  Always use your most recent med list.                   Brand Name Dispense Instructions for use Diagnosis    CRITIC-AID CLEAR Oint     1 Tube    Externally apply 1 Application topically 2 times daily    Decubitus ulcer of buttock, unspecified laterality, stage I       CYANOCOBALAMIN PO      Take 500 mcg by mouth daily        order for DME     90 Units    Please dispense to take 3 Ensure/day.    Anorexia, DLBCL (diffuse large B cell lymphoma) (H)       TYLENOL PO      Take 325 mg by mouth

## 2017-08-21 NOTE — LETTER
8/21/2017       RE: Simran Negro  [address]   [place] [address]     Dear Colleague,    Thank you for referring your patient, Simran Negro, to the [facility]. Please see a copy of my visit note below.    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   Date of Service: 2/20/2017    HISTORY OF PRESENT ILLNESS:  Ms. Negro is a 67 year old [facility] female here for follow up of CNS relapse of DLBCL.  She was seen with a [facility]  today.  To summarize her course, she was diagnosed with DLBCL of the cervix in 06/2007 and was treated with 6 cycles of R-CHOP.  She attained a CR, but in early 2014 developed difficulty concentrating, gait instability, and right-sided headaches with some blurry vision.  Workup revealed an intra-axial right parietal mass.  Stereotactic biopsy on 03/19/2014 which was diagnostic for diffuse large B-cell lymphoma, non-germinal center type.  Immunophenotypically, this was similar to her previous diffuse large B-cell lymphoma, and this was presumed CNS relapse.  She was started on MT-R (methotrexate, temozolomide and rituximab) therapy on 3/25/2014.  Her course was complicated by line-related DVT, and she completed a course of enoxaparin for this.  She attained a complete clinical and radiographic response after completing the MT-R treatment regimen and EA consolidation in 8/2014.  She is here for ongoing follow up.    She has struggled with a sore throat for the past several days.  She was seen at [facility] and strep test was negative (in CareEverywhere).  She is drinking OK but difficult to eat.  Prior to that her energy level and appetite have been OK.  Weight stable.  No fevers, chills, or night sweats.  No headache, vision changes, focal weakness or sensory changes.  No dyspnea, cough, or chest pain.  Normal bowel function.  No urinary complaints.  No bleeding, bruising, or skin rashes.  No pain.  No lumps or bumps.  She  continues to have some waxing and waning issues with fatigue and depression, but overall has been managing well.    REVIEW OF SYSTEMS:  A complete 10-point review of systems was negative other than noted.    MEDICATIONS:  Current Outpatient Prescriptions   Medication     Acetaminophen (TYLENOL PO)     CYANOCOBALAMIN PO     Skin Protectants, Misc. (CRITIC-AID CLEAR) OINT     ORDER FOR DME     No current facility-administered medications for this visit.      PHYSICAL EXAMINATION:  /70 (BP Location: Left arm, Patient Position: Chair, Cuff Size: Adult Regular)  Pulse 58  Temp 97.1  F (36.2  C) (Oral)  Resp 16  Wt 62.4 kg (137 lb 9.1 oz)  SpO2 99%  BMI 27.79 kg/m2  Wt Readings from Last 5 Encounters:   08/21/17 62.4 kg (137 lb 9.1 oz)   02/20/17 63.9 kg (140 lb 12.8 oz)   08/22/16 63.5 kg (140 lb)   05/23/16 66.1 kg (145 lb 11.6 oz)   02/15/16 64.7 kg (142 lb 10.2 oz)     General: Well appearing  female. No acute distress.  HEENT: Sclerae anicteric. No OP lesions, masses, or tonsilar enlargement.  Lungs: Clear bilaterally without wheezing or crackles.  Heart: Regular rate and rhythm without murmurs.  Gastrointestinal: Bowel sounds present, no tenderness to palpation, spleen tip not palpable. Exam limited by habitus.  Extremities: No lower extremity edema.  Lymph:  No cervical, clavicular, axillary, epitrochlear, or inguinal lymphadenopathy.  Neuro: Grossly non-focal.  Skin/access: No visible rash. No IV access.  Performance status: ECOG 1    LABORATORY DATA:  Recent Labs   Lab Test  08/21/17   0749  02/20/17   0927  08/22/16   0915   09/05/14   0715  09/04/14   0552  09/03/14   0734   WBC  7.0  5.3  5.4   < >  4.6  6.5  8.0   HGB  12.3  12.9  13.6   < >  10.6*  10.1*  10.2*   PLT  193  199  174   < >  131*  78*  58*   ANEU  4.6  2.7  3.0   < >  3.2  4.8  6.6   ABLA   --    --    --    --   0.0  0.1*  0.3*   ALYM  1.7  2.2  2.0   < >  0.5*  0.5*  0.2*    < > = values in this interval not displayed.      Recent Labs   Lab Test  08/21/17   0749  02/20/17   0927  08/22/16   0915   09/09/14   0825  09/05/14   0715   08/29/14   0113   08/28/14   1230   07/28/14   1007   06/30/14   0955   06/20/14   0437   NA  141  143  141   < >  140  143   < >   --    --    --    < >  141   < >  144   < >  143   POTASSIUM  3.6  3.3*  3.6   < >  3.9  3.8   < >   --    < >   --    < >  3.4   < >  3.2*   < >  3.6   CHLORIDE  107  110*  104   < >  105  109   < >   --    --    --    < >  105   < >  104   < >  101   CO2  26  21  25   < >  26  27   < >   --    --    --    < >  25   < >  24   < >  35*   BUN  18  16  15   < >  10  3*   < >   --    --    --    < >  13   < >  6*   < >  3*   CR  0.93  0.81  0.74   < >  0.76  0.64   < >   --    --    --    < >  0.70   < >  0.85   < >  0.82   EMMA  8.6  8.2*  8.9   < >  8.6  8.3*   < >   --    --    --    < >  9.5   < >  9.4   < >  8.3*   MAG   --    --    --    --   1.9  1.6   --    --    --   2.0   < >   --    < >   --    --    --    PHOS   --    --    --    --   4.4  2.1*   --   2.9   < >   --    < >   --    < >   --    --    --    URIC   --    --    --    --    --    --    --    --    --    --    --   6.7   --   7.1   --   3.6   LDH  152  164  157   < >   --    --    --    --    --    --    < >  447   < >  460   --   366    < > = values in this interval not displayed.     Recent Labs   Lab Test  08/21/17   0749  02/20/17   0927  09/14/16   1208  08/22/16   0915   08/28/14   0415  08/27/14   0627   AST  17  19   --   18   < >  7   --    ALT  26  28   --   32   < >  12   --    ALKPHOS  90  94   --   86   < >  96   --    BILITOTAL  0.8  1.0   --   1.3   < >  0.8   --    INR   --    --   1.03   --    --   1.22*  1.22*    < > = values in this interval not displayed.     IMAGING DATA:  MRI brain today with no evidence of lymphoma recurrence    IMPRESSION AND PLAN:  Ms. Negro is a 67-year-old woman with past CNS relapse of DLBCL, here for followup.      In regards to her CNS relapse of DLBCL, she  clinically and radiographically remains in remission now about 3 years out from completing therapy. Her labs look good, SPEP is pending (but has been negative).  Continue to follow every 6 months with annual MRI at least until 5 years post treatment.  We discussed the importance that she notify us if any new issues come up in between visits.      She has no active infectious issues.  She is up-to-date for immunizations.  She will be next due for repeat Pneumovax in 05/2021.       Her sore throat is likely viral in nature, and will improve with supportive measures.  There is no notable neck adenopathy or suggestion of lymphoma.  Strep test was recently negative.  She will use salt water rinses and let Dr. Flor know if it is not better in the next week, and will work with him for routine health care issues.     We will plan to see her back in 6 months, and reminded her to call in the interim if questions, concerns or new issues arise.     Karson Dailey MD, PhD   of Medicine  Division of Hematology, Oncology, and Transplantation  [email or web address] email  [phone] office  [phone] pager

## 2017-08-21 NOTE — PROGRESS NOTES
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   Date of Service: 2/20/2017    HISTORY OF PRESENT ILLNESS:  Ms. Negro is a 67 year old [facility] [place] for follow up of CNS relapse of DLBCL.  She was seen with a [facility]  [place].  To summarize her course, she was diagnosed with DLBCL of the cervix in 06/2007 and was treated with 6 cycles of R-CHOP.  She attained a CR, but in early 2014 developed difficulty concentrating, gait instability, and right-sided headaches with some blurry vision.  Workup revealed an intra-axial right parietal mass.  Stereotactic biopsy on 03/19/2014 which was diagnostic for diffuse large B-cell lymphoma, non-germinal center type.  Immunophenotypically, this was similar to her previous diffuse large B-cell lymphoma, and this was presumed CNS relapse.  She was started on MT-R (methotrexate, temozolomide and rituximab) therapy on 3/25/2014.  Her course was complicated by line-related DVT, and she completed a course of enoxaparin for this.  She attained a complete clinical and radiographic response after completing the MT-R treatment regimen and EA consolidation in 8/2014.  She is here for ongoing follow up.    She has struggled with a sore throat for the past several days.  [place] [facility] [place] was negative (in [facility]).  She is drinking OK but difficult to eat.  Prior to that her energy level and appetite have been OK.  Weight stable.  No fevers, chills, or night sweats.  No headache, vision changes, focal weakness or sensory changes.  No dyspnea, cough, or chest pain.  Normal bowel function.  No urinary complaints.  No bleeding, bruising, or skin rashes.  No pain.  No lumps or bumps.  She continues to have some waxing and waning issues with fatigue and depression, but overall has been managing well.    REVIEW OF SYSTEMS:  A complete 10-point review of systems was negative other than noted.    MEDICATIONS:  Current  Outpatient Prescriptions   Medication     Acetaminophen (TYLENOL PO)     CYANOCOBALAMIN PO     Skin Protectants, Misc. (CRITIC-AID CLEAR) OINT     ORDER FOR DME     No current facility-administered medications for this visit.      PHYSICAL EXAMINATION:  /70 (BP Location: Left arm, Patient Position: Chair, Cuff Size: Adult Regular)  Pulse 58  Temp 97.1  F (36.2  C) (Oral)  Resp 16  Wt 62.4 kg (137 lb 9.1 oz)  SpO2 99%  BMI 27.79 kg/m2  Wt Readings from Last 5 Encounters:   08/21/17 62.4 kg (137 lb 9.1 oz)   02/20/17 63.9 kg (140 lb 12.8 oz)   08/22/16 63.5 kg (140 lb)   05/23/16 66.1 kg (145 lb 11.6 oz)   02/15/16 64.7 kg (142 lb 10.2 oz)     General: Well appearing  female. No acute distress.  HEENT: Sclerae anicteric. No OP lesions, masses, or tonsilar enlargement.  Lungs: Clear bilaterally without wheezing or crackles.  Heart: Regular rate and rhythm without murmurs.  Gastrointestinal: Bowel sounds present, no tenderness to palpation, spleen tip not palpable. Exam limited by habitus.  Extremities: No lower extremity edema.  Lymph:  No cervical, clavicular, axillary, epitrochlear, or inguinal lymphadenopathy.  Neuro: Grossly non-focal.  Skin/access: No visible rash. No IV access.  Performance status: ECOG 1    LABORATORY DATA:  Recent Labs   Lab Test  08/21/17   0749  02/20/17   0927  08/22/16   0915   09/05/14   0715  09/04/14   0552  09/03/14   0734   WBC  7.0  5.3  5.4   < >  4.6  6.5  8.0   HGB  12.3  12.9  13.6   < >  10.6*  10.1*  10.2*   PLT  193  199  174   < >  131*  78*  58*   ANEU  4.6  2.7  3.0   < >  3.2  4.8  6.6   ABLA   --    --    --    --   0.0  0.1*  0.3*   ALYM  1.7  2.2  2.0   < >  0.5*  0.5*  0.2*    < > = values in this interval not displayed.     Recent Labs   Lab Test  08/21/17   0749  02/20/17   0927  08/22/16   0915   09/09/14   0825  09/05/14   0715   08/29/14   0113   08/28/14   1230   07/28/14   1007   06/30/14   0955   06/20/14   0437   NA  141  143  141   < >  140   143   < >   --    --    --    < >  141   < >  144   < >  143   POTASSIUM  3.6  3.3*  3.6   < >  3.9  3.8   < >   --    < >   --    < >  3.4   < >  3.2*   < >  3.6   CHLORIDE  107  110*  104   < >  105  109   < >   --    --    --    < >  105   < >  104   < >  101   CO2  26  21  25   < >  26  27   < >   --    --    --    < >  25   < >  24   < >  35*   BUN  18  16  15   < >  10  3*   < >   --    --    --    < >  13   < >  6*   < >  3*   CR  0.93  0.81  0.74   < >  0.76  0.64   < >   --    --    --    < >  0.70   < >  0.85   < >  0.82   EMMA  8.6  8.2*  8.9   < >  8.6  8.3*   < >   --    --    --    < >  9.5   < >  9.4   < >  8.3*   MAG   --    --    --    --   1.9  1.6   --    --    --   2.0   < >   --    < >   --    --    --    PHOS   --    --    --    --   4.4  2.1*   --   2.9   < >   --    < >   --    < >   --    --    --    URIC   --    --    --    --    --    --    --    --    --    --    --   6.7   --   7.1   --   3.6   LDH  152  164  157   < >   --    --    --    --    --    --    < >  447   < >  460   --   366    < > = values in this interval not displayed.     Recent Labs   Lab Test  08/21/17   0749  02/20/17   0927  09/14/16   1208  08/22/16   0915   08/28/14   0415  08/27/14   0627   AST  17  19   --   18   < >  7   --    ALT  26  28   --   32   < >  12   --    ALKPHOS  90  94   --   86   < >  96   --    BILITOTAL  0.8  1.0   --   1.3   < >  0.8   --    INR   --    --   1.03   --    --   1.22*  1.22*    < > = values in this interval not displayed.     IMAGING DATA:  MRI brain today with no evidence of lymphoma recurrence    IMPRESSION AND PLAN:  Ms. Negro is a 67-year-old woman with past CNS relapse of DLBCL, here for followup.      In regards to her CNS relapse of DLBCL, she clinically and radiographically remains in remission now about 3 years out from completing therapy. Her labs look good, SPEP is pending (but has been negative).  Continue to follow every 6 months with annual MRI at least until 5 years  post treatment.  We discussed the importance that she notify us if any new issues come up in between visits.      She has no active infectious issues.  She is up-to-date for immunizations.  She will be next due for repeat Pneumovax in 05/2021.       Her sore throat is likely viral in nature, and will improve with supportive measures.  There is no notable neck adenopathy or suggestion of lymphoma.  Strep test was recently negative.  She will use salt water rinses and let Dr. Flor know if it is not better in the next week, and will work with him for routine health care issues.     We will plan to see her back in 6 months, and reminded her to call in the interim if questions, concerns or new issues arise.     Karson Dailey MD, PhD   of Medicine  Division of Hematology, Oncology, and Transplantation  [email or web address] email  [phone] office  [phone] pager

## 2017-08-21 NOTE — NURSING NOTE
"Oncology Rooming Note    August 21, 2017 8:12 AM   Simran Negro is a 67 year old female who presents for:    Chief Complaint   Patient presents with     Oncology Clinic Visit     Lymphoma , MRI results Pain      Initial Vitals: /70 (BP Location: Left arm, Patient Position: Chair, Cuff Size: Adult Regular)  Pulse 58  Temp 97.1  F (36.2  C) (Oral)  Resp 16  Wt 62.4 kg (137 lb 9.1 oz)  SpO2 99%  BMI 27.79 kg/m2 Estimated body mass index is 27.79 kg/(m^2) as calculated from the following:    Height as of 8/22/16: 1.499 m (4' 11\").    Weight as of this encounter: 62.4 kg (137 lb 9.1 oz). Body surface area is 1.61 meters squared.  Worst Pain (10) Comment: Data Unavailable   No LMP recorded. Patient is postmenopausal.  Allergies reviewed: Yes  Medications reviewed: Yes    Medications: Medication refills not needed today.  Pharmacy name entered into [facility]:    [facility] - [place], [place] - [address] [facility] (USE ONLY [facility]) - [place], [place] - [address] [place] [address]    Clinical concerns: pain  Eckfeldt was notified.    7 minutes for nursing intake (face to face time)     Carla Valdivia MA              "

## 2017-08-22 LAB
ALBUMIN SERPL ELPH-MCNC: 3.9 G/DL (ref 3.7–5.1)
ALPHA1 GLOB SERPL ELPH-MCNC: 0.3 G/DL (ref 0.2–0.4)
ALPHA2 GLOB SERPL ELPH-MCNC: 0.7 G/DL (ref 0.5–0.9)
B-GLOBULIN SERPL ELPH-MCNC: 0.7 G/DL (ref 0.6–1)
GAMMA GLOB SERPL ELPH-MCNC: 0.9 G/DL (ref 0.7–1.6)
M PROTEIN SERPL ELPH-MCNC: 0 G/DL
PROT PATTERN SERPL ELPH-IMP: NORMAL

## 2018-02-19 ENCOUNTER — ONCOLOGY VISIT (OUTPATIENT)
Dept: ONCOLOGY | Facility: CLINIC | Age: 68
End: 2018-02-19
Attending: INTERNAL MEDICINE
Payer: MEDICARE

## 2018-02-19 ENCOUNTER — APPOINTMENT (OUTPATIENT)
Dept: LAB | Facility: CLINIC | Age: 68
End: 2018-02-19
Attending: INTERNAL MEDICINE
Payer: MEDICARE

## 2018-02-19 VITALS
RESPIRATION RATE: 16 BRPM | BODY MASS INDEX: 27.27 KG/M2 | SYSTOLIC BLOOD PRESSURE: 107 MMHG | DIASTOLIC BLOOD PRESSURE: 65 MMHG | TEMPERATURE: 98.5 F | HEART RATE: 62 BPM | WEIGHT: 135 LBS | OXYGEN SATURATION: 99 %

## 2018-02-19 DIAGNOSIS — C83.30 DLBCL (DIFFUSE LARGE B CELL LYMPHOMA) (H): ICD-10-CM

## 2018-02-19 DIAGNOSIS — C83.390 CENTRAL NERVOUS SYSTEM LYMPHOMA: Primary | ICD-10-CM

## 2018-02-19 LAB
ALBUMIN SERPL-MCNC: 3.6 G/DL (ref 3.4–5)
ALP SERPL-CCNC: 93 U/L (ref 40–150)
ALT SERPL W P-5'-P-CCNC: 20 U/L (ref 0–50)
ANION GAP SERPL CALCULATED.3IONS-SCNC: 10 MMOL/L (ref 3–14)
AST SERPL W P-5'-P-CCNC: 20 U/L (ref 0–45)
BASOPHILS # BLD AUTO: 0 10E9/L (ref 0–0.2)
BASOPHILS NFR BLD AUTO: 0.3 %
BILIRUB SERPL-MCNC: 0.8 MG/DL (ref 0.2–1.3)
BUN SERPL-MCNC: 20 MG/DL (ref 7–30)
CALCIUM SERPL-MCNC: 8.5 MG/DL (ref 8.5–10.1)
CHLORIDE SERPL-SCNC: 111 MMOL/L (ref 94–109)
CO2 SERPL-SCNC: 21 MMOL/L (ref 20–32)
CREAT SERPL-MCNC: 0.78 MG/DL (ref 0.52–1.04)
DIFFERENTIAL METHOD BLD: NORMAL
EOSINOPHIL # BLD AUTO: 0.1 10E9/L (ref 0–0.7)
EOSINOPHIL NFR BLD AUTO: 1.2 %
ERYTHROCYTE [DISTWIDTH] IN BLOOD BY AUTOMATED COUNT: 12 % (ref 10–15)
GFR SERPL CREATININE-BSD FRML MDRD: 73 ML/MIN/1.7M2
GLUCOSE SERPL-MCNC: 169 MG/DL (ref 70–99)
HCT VFR BLD AUTO: 40.4 % (ref 35–47)
HGB BLD-MCNC: 13.3 G/DL (ref 11.7–15.7)
IMM GRANULOCYTES # BLD: 0 10E9/L (ref 0–0.4)
IMM GRANULOCYTES NFR BLD: 0.2 %
LDH SERPL L TO P-CCNC: 160 U/L (ref 81–234)
LYMPHOCYTES # BLD AUTO: 2.3 10E9/L (ref 0.8–5.3)
LYMPHOCYTES NFR BLD AUTO: 36.4 %
MCH RBC QN AUTO: 27.5 PG (ref 26.5–33)
MCHC RBC AUTO-ENTMCNC: 32.9 G/DL (ref 31.5–36.5)
MCV RBC AUTO: 84 FL (ref 78–100)
MONOCYTES # BLD AUTO: 0.3 10E9/L (ref 0–1.3)
MONOCYTES NFR BLD AUTO: 4.7 %
NEUTROPHILS # BLD AUTO: 3.7 10E9/L (ref 1.6–8.3)
NEUTROPHILS NFR BLD AUTO: 57.2 %
NRBC # BLD AUTO: 0 10*3/UL
NRBC BLD AUTO-RTO: 0 /100
PLATELET # BLD AUTO: 194 10E9/L (ref 150–450)
POTASSIUM SERPL-SCNC: 3.3 MMOL/L (ref 3.4–5.3)
PROT SERPL-MCNC: 6.7 G/DL (ref 6.8–8.8)
RBC # BLD AUTO: 4.84 10E12/L (ref 3.8–5.2)
SODIUM SERPL-SCNC: 142 MMOL/L (ref 133–144)
WBC # BLD AUTO: 6.4 10E9/L (ref 4–11)

## 2018-02-19 PROCEDURE — 80053 COMPREHEN METABOLIC PANEL: CPT | Performed by: INTERNAL MEDICINE

## 2018-02-19 PROCEDURE — 99213 OFFICE O/P EST LOW 20 MIN: CPT | Mod: GC | Performed by: INTERNAL MEDICINE

## 2018-02-19 PROCEDURE — G0463 HOSPITAL OUTPT CLINIC VISIT: HCPCS | Mod: ZF

## 2018-02-19 PROCEDURE — 00000402 ZZHCL STATISTIC TOTAL PROTEIN: Performed by: INTERNAL MEDICINE

## 2018-02-19 PROCEDURE — 84165 PROTEIN E-PHORESIS SERUM: CPT | Performed by: INTERNAL MEDICINE

## 2018-02-19 PROCEDURE — 36415 COLL VENOUS BLD VENIPUNCTURE: CPT

## 2018-02-19 PROCEDURE — 83615 LACTATE (LD) (LDH) ENZYME: CPT | Performed by: INTERNAL MEDICINE

## 2018-02-19 PROCEDURE — 85025 COMPLETE CBC W/AUTO DIFF WBC: CPT | Performed by: INTERNAL MEDICINE

## 2018-02-19 RX ORDER — DIPHENOXYLATE HYDROCHLORIDE AND ATROPINE SULFATE 2.5; .025 MG/1; MG/1
TABLET ORAL
COMMUNITY
Start: 2018-02-14 | End: 2021-02-10

## 2018-02-19 RX ORDER — AMMONIUM LACTATE 12 G/100G
CREAM TOPICAL
COMMUNITY
Start: 2018-02-14 | End: 2021-02-10

## 2018-02-19 RX ORDER — ASPIRIN 81 MG/1
81 TABLET ORAL
Status: ON HOLD | COMMUNITY
Start: 2018-02-14 | End: 2019-09-27

## 2018-02-19 RX ORDER — MAGNESIUM GLUCONATE 27 MG(500)
500 TABLET ORAL
COMMUNITY
Start: 2017-11-29 | End: 2021-02-10

## 2018-02-19 RX ORDER — ZOLPIDEM TARTRATE 5 MG/1
5 TABLET ORAL
Status: ON HOLD | COMMUNITY
Start: 2018-02-14 | End: 2019-09-27

## 2018-02-19 RX ORDER — GUAIFENESIN AND DEXTROMETHORPHAN HYDROBROMIDE 100; 10 MG/5ML; MG/5ML
10 SOLUTION ORAL
COMMUNITY
Start: 2018-02-14 | End: 2018-03-01

## 2018-02-19 RX ORDER — ACETAMINOPHEN 325 MG/1
325-650 TABLET ORAL
COMMUNITY
Start: 2018-02-14 | End: 2019-03-04

## 2018-02-19 RX ORDER — CETIRIZINE HYDROCHLORIDE 10 MG/1
10 TABLET ORAL
Status: ON HOLD | COMMUNITY
Start: 2018-02-14 | End: 2019-09-27

## 2018-02-19 RX ORDER — FLUTICASONE PROPIONATE 50 MCG
2 SPRAY, SUSPENSION (ML) NASAL
Status: ON HOLD | COMMUNITY
Start: 2018-02-14 | End: 2019-09-27

## 2018-02-19 ASSESSMENT — PAIN SCALES - GENERAL: PAINLEVEL: EXTREME PAIN (8)

## 2018-02-19 NOTE — LETTER
2/19/2018       RE: Simran Negro  [address]   [place] [address]     Dear Colleague,    Thank you for referring your patient, Simran Negro, to the [facility]. Please see a copy of my visit note below.    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   Date of Service: 2/20/2017    HISTORY OF PRESENT ILLNESS:  Ms. Negro is a 68 year old [facility] female here for follow up of CNS relapse of DLBCL.  She was seen with a [facility]  today.  To summarize her course, she was diagnosed with DLBCL of the cervix in 06/2007 and was treated with 6 cycles of R-CHOP.  She attained a CR, but in early 2014 developed difficulty concentrating, gait instability, and right-sided headaches with some blurry vision.  Workup revealed an intra-axial right parietal mass.  Stereotactic biopsy on 03/19/2014 which was diagnostic for diffuse large B-cell lymphoma, non-germinal center type.  Immunophenotypically, this was similar to her previous diffuse large B-cell lymphoma, and this was presumed CNS relapse.  She was started on MT-R (methotrexate, temozolomide and rituximab) therapy in 03/2014.  Her course was complicated by line-related DVT, and she completed a course of enoxaparin for this.  She attained a complete clinical and radiographic response after completing the MT-R treatment regimen and EA consolidation in 8/2014.  She is here for ongoing follow up.    She had viral gastroenteritis with fever, chills, nausea, vomiting approximately 2 weeks ago, and has been symptomatically treated by Dr. Flor on 2/14.  After visiting Dr. Flor's office, she has been feeling better and her symptoms has been subsiding.  She denies CNS symptoms including headache, numbness, weakness, visual disturbance, dizziness.  She denies systemic constitutional symptoms including fever, chills, anorexia, abnormal weight loss, night sweats.     REVIEW OF SYSTEMS:  A complete 10-point  review of systems was negative other than noted.    MEDICATIONS:  Current Outpatient Prescriptions   Medication     acetaminophen (TYLENOL) 325 MG tablet     zolpidem (AMBIEN) 5 MG tablet     ranitidine (ZANTAC) 150 MG tablet     omeprazole (PRILOSEC) 20 MG CR capsule     Multiple Vitamin (MULTI-VITAMINS) TABS     magnesium gluconate (MAGONATE) 500 MG tablet     Dextromethorphan-guaiFENesin  MG/5ML syrup     fluticasone (FLONASE) 50 MCG/ACT spray     cetirizine (ZYRTEC) 10 MG tablet     Calcium carb-Vitamin D 500 mg [place]-200 units ([place] CALCIUM/D) 500-200 MG-UNIT per tablet     aspirin EC 81 MG EC tablet     ammonium lactate (AMLACTIN) 12 % cream     acetaminophen-codeine (TYLENOL #3) 300-30 MG per tablet     Acetaminophen (TYLENOL PO)     CYANOCOBALAMIN PO     Skin Protectants, Misc. (CRITIC-AID CLEAR) OINT     ORDER FOR DME     No current facility-administered medications for this visit.      PHYSICAL EXAMINATION:  /65 (BP Location: Right arm, Patient Position: Sitting, Cuff Size: Adult Regular)  Pulse 62  Temp 98.5  F (36.9  C) (Oral)  Resp 16  Wt 61.2 kg (135 lb)  SpO2 99%  BMI 27.27 kg/m2  Wt Readings from Last 5 Encounters:   02/19/18 61.2 kg (135 lb)   08/21/17 62.4 kg (137 lb 9.1 oz)   02/20/17 63.9 kg (140 lb 12.8 oz)   08/22/16 63.5 kg (140 lb)   05/23/16 66.1 kg (145 lb 11.6 oz)     General: Well appearing  female. No acute distress.  HEENT: Sclerae anicteric. No OP lesions, masses, or tonsilar enlargement.  Lungs: Clear bilaterally without wheezing or crackles.  Heart: Regular rate and rhythm without murmurs.  Gastrointestinal: Bowel sounds present, no tenderness to palpation, spleen tip not palpable. Exam limited by habitus.  Extremities: No lower extremity edema.  Lymph:  No cervical, clavicular, axillary, epitrochlear, or inguinal lymphadenopathy.  Neuro: Grossly non-focal.  Skin/access: No visible rash. No IV access.  Performance status: ECOG 1    LABORATORY  DATA:  Recent Labs   Lab Test  02/19/18   0937  08/21/17   0749  02/20/17   0927   09/05/14   0715  09/04/14   0552  09/03/14   0734   WBC  6.4  7.0  5.3   < >  4.6  6.5  8.0   HGB  13.3  12.3  12.9   < >  10.6*  10.1*  10.2*   PLT  194  193  199   < >  131*  78*  58*   ANEU  3.7  4.6  2.7   < >  3.2  4.8  6.6   ABLA   --    --    --    --   0.0  0.1*  0.3*   ALYM  2.3  1.7  2.2   < >  0.5*  0.5*  0.2*    < > = values in this interval not displayed.     Recent Labs   Lab Test  02/19/18 0937  08/21/17   0749  02/20/17   0927   09/09/14   0825  09/05/14   0715   08/29/14   0113   08/28/14   1230   07/28/14   1007   06/30/14   0955   06/20/14   0437   NA  142  141  143   < >  140  143   < >   --    --    --    < >  141   < >  144   < >  143   POTASSIUM  3.3*  3.6  3.3*   < >  3.9  3.8   < >   --    < >   --    < >  3.4   < >  3.2*   < >  3.6   CHLORIDE  111*  107  110*   < >  105  109   < >   --    --    --    < >  105   < >  104   < >  101   CO2  21  26  21   < >  26  27   < >   --    --    --    < >  25   < >  24   < >  35*   BUN  20  18  16   < >  10  3*   < >   --    --    --    < >  13   < >  6*   < >  3*   CR  0.78  0.93  0.81   < >  0.76  0.64   < >   --    --    --    < >  0.70   < >  0.85   < >  0.82   EMMA  8.5  8.6  8.2*   < >  8.6  8.3*   < >   --    --    --    < >  9.5   < >  9.4   < >  8.3*   MAG   --    --    --    --   1.9  1.6   --    --    --   2.0   < >   --    < >   --    --    --    PHOS   --    --    --    --   4.4  2.1*   --   2.9   < >   --    < >   --    < >   --    --    --    URIC   --    --    --    --    --    --    --    --    --    --    --   6.7   --   7.1   --   3.6   LDH  160  152  164   < >   --    --    --    --    --    --    < >  447   < >  460   --   366    < > = values in this interval not displayed.     Recent Labs   Lab Test  02/19/18   0937  08/21/17   0749  02/20/17   0927  09/14/16   1208   08/28/14   0415  08/27/14   0627   AST  20 17  19   --    < >  7   --    ALT  20   26  28   --    < >  12   --    ALKPHOS  93  90  94   --    < >  96   --    BILITOTAL  0.8  0.8  1.0   --    < >  0.8   --    INR   --    --    --   1.03   --   1.22*  1.22*    < > = values in this interval not displayed.     IMPRESSION AND PLAN:  Ms. Negro is a 68-year-old woman with past CNS relapse of DLBCL, here for followup.      In regards to her CNS relapse of DLBCL, she clinically remains in remission now almost 3 and a half years out from completing therapy.  Her labs look good, SPEP is pending (but has been negative).  We will continue to follow every 6 months with annual MRI at least until 5 years post treatment.  We discussed the importance that she notify us if any new issues come up in between visits.      She has no active infectious issues.  She is up-to-date for flu and pneumoccal immunizations.  She will be next due for repeat Pneumovax in 05/2021.       Her viral gastroenteritis has been getting better with symptomatic treatment and she does not look significantly ill today.  She will continue to work with Dr. Flor for her routine medical management.     We will plan to see her back in 6 months with MRI brain prior, and reminded her to call in the interim if questions, concerns or new issues arise.     The patient was seen and discussed with Dr. Dailey.    Tamara Lawrence MD  BMT/Hem/Onc Fellow    ATTENDING ADDENDUM:  The patient has been seen and evaluated by me.  I have reviewed today's vital signs, medications, labs, and imaging results independently, and have discussed the patient and plan with the fellow, and agree with the findings and plan outlined in this note.  The impression and plan were jointly made.    Karson Dailey MD, PhD   of Medicine  Division of Hematology, Oncology, and Transplantation  [email or web address] email  [phone] office  [phone] pager

## 2018-02-19 NOTE — MR AVS SNAPSHOT
"              After Visit Summary   2/19/2018    Simran Negro    MRN: [id number]           Patient Information     Date Of Birth          1950        Visit Information        Provider Department      2/19/2018 9:30 AM Karson Dailey MD [facility]         Follow-ups after your visit        Your next 10 appointments already scheduled     Feb 19, 2018  9:00 AM CST   [facility] Draw with  [facility] DRAW   [facility] Lab Draw ([facility])    [address] [place] [id number] [phone]            Feb 19, 2018  9:30 AM CST   (Arrive by 9:15 AM)   Return Visit with Karson Dailey MD   [facility] ([facility])    [address] [place] [id number] [phone]              Who to contact     If you have questions or need follow up information about today's clinic visit or your schedule please contact [facility] directly at [phone].  Normal or non-critical lab and imaging results will be communicated to you by [employer], letter or phone within 4 business days after the clinic has received the results. If you do not hear from us within 7 days, please contact the clinic through [employer] or phone. If you have a critical or abnormal lab result, we will notify you by phone as soon as possible.  Submit refill requests through [employer] or call your pharmacy and they will forward the refill request to us. Please allow 3 business days for your refill to be completed.          Additional Information About Your Visit        [employer]ter Information     [employer] lets you send messages to your doctor, view your test results, renew your prescriptions, schedule appointments and more. To sign up, go to [email or web address] . Click on \"Log in\" on the left side of the screen, which will take you to the Welcome page. Then click on \"Sign up Now\" on the right " side of the page.     You will be asked to enter the access code listed below, as well as some personal information. Please follow the directions to create your username and password.     Your access code is: [email or web address]  Expires: 3/13/2018 12:29 PM     Your access code will  in 90 days. If you need help or a new code, please call your [facility] or [phone].        Care EveryWhere ID     This is your Care EveryWhere ID. This could be used by other organizations to access your [place] medical records  [id number]         Blood Pressure from Last 3 Encounters:   17 119/70   17 99/65   16 116/70    Weight from Last 3 Encounters:   17 62.4 kg (137 lb 9.1 oz)   17 63.9 kg (140 lb 12.8 oz)   16 63.5 kg (140 lb)              Today, you had the following     No orders found for display       Primary Care Provider Office Phone # Fax #    Ari Flor [phone]       [facility] [address] [facility] [address]        Equal Access to Services     ELIJAH [facility] WesleyKEVON : Hadii aad ku hadasho Soomaali, waaxda luqadaha, qaybta kaalmada adeegyada, antonio fowler. So [facility] [phone].    ATENCIÓN: Si habla español, tiene a johnson disposición servicios gratuitos de asistencia lingüística. Mireya al [phone].    We comply with applicable federal civil rights laws and [place] laws. We do not discriminate on the basis of race, color, national origin, age, disability, sex, sexual orientation, or gender identity.            Thank you!     Thank you for choosing [facility]  for your care. Our goal is always to provide you with excellent care. Hearing back from our patients is one way we can continue to improve our services. Please take a few minutes to complete the written survey that you may receive in the mail after your visit with us. Thank you!             Your Updated Medication List - Protect  others around you: Learn how to safely use, store and throw away your medicines at www.disposemymeds.org.          This list is accurate as of: 12/13/17 12:29 PM.  Always use your most recent med list.                   Brand Name Dispense Instructions for use Diagnosis    CRITIC-AID CLEAR Oint     1 Tube    Externally apply 1 Application topically 2 times daily    Decubitus ulcer of buttock, unspecified laterality, stage I       CYANOCOBALAMIN PO      Take 500 mcg by mouth daily        order for DME     90 Units    Please dispense to take 3 Ensure/day.    Anorexia, DLBCL (diffuse large B cell lymphoma) (H)       TYLENOL PO      Take 325 mg by mouth

## 2018-02-19 NOTE — NURSING NOTE
"Oncology Rooming Note    February 19, 2018 9:59 AM   Simran Negro is a 68 year old female who presents for:    Chief Complaint   Patient presents with     Blood Draw     labs drawn with vpt by rn.  vs taken     Initial Vitals: /65 (BP Location: Right arm, Patient Position: Sitting, Cuff Size: Adult Regular)  Pulse 62  Temp 98.5  F (36.9  C) (Oral)  Resp 16  Wt 61.2 kg (135 lb)  SpO2 99%  BMI 27.27 kg/m2 Estimated body mass index is 27.27 kg/(m^2) as calculated from the following:    Height as of 8/22/16: 1.499 m (4' 11\").    Weight as of this encounter: 61.2 kg (135 lb). Body surface area is 1.6 meters squared.  Extreme Pain (8) Comment: Data Unavailable   No LMP recorded. Patient is postmenopausal.  Allergies reviewed: Yes  Medications reviewed: Yes    Medications: Medication refills not needed today.  Pharmacy name entered into [facility]:    [facility] - [place], [place] - [address] [facility] (USE ONLY [facility]) - [place], [place] - [address] [place] [address]    Clinical concerns: pt states she does not know the name of the medication she is taking .  Provider was notified.    7 minutes for nursing intake (face to face time)     Denise Ann              "

## 2018-02-19 NOTE — NURSING NOTE
Chief Complaint   Patient presents with     Blood Draw     labs drawn with vpt by rn.  vs taken     Labs drawn with vpt by rn.  Pt tolerated well.  VS taken.  Pt checked in for next appt.  (Milady faulkner present throughout appt).  Hailey Huang RN

## 2018-02-20 LAB
ALBUMIN SERPL ELPH-MCNC: 4.2 G/DL (ref 3.7–5.1)
ALPHA1 GLOB SERPL ELPH-MCNC: 0.2 G/DL (ref 0.2–0.4)
ALPHA2 GLOB SERPL ELPH-MCNC: 0.7 G/DL (ref 0.5–0.9)
B-GLOBULIN SERPL ELPH-MCNC: 0.7 G/DL (ref 0.6–1)
GAMMA GLOB SERPL ELPH-MCNC: 0.9 G/DL (ref 0.7–1.6)
M PROTEIN SERPL ELPH-MCNC: 0 G/DL
PROT PATTERN SERPL ELPH-IMP: NORMAL

## 2018-02-20 NOTE — PROGRESS NOTES
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   Date of Service: 2/20/2017    HISTORY OF PRESENT ILLNESS:  Ms. Negro is a 68 year old [facility] female here for follow up of CNS relapse of DLBCL.  She was seen with a [facility]  today.  To summarize her course, she was diagnosed with DLBCL of the cervix in 06/2007 and was treated with 6 cycles of R-CHOP.  She attained a CR, but in early 2014 developed difficulty concentrating, gait instability, and right-sided headaches with some blurry vision.  Workup revealed an intra-axial right parietal mass.  Stereotactic biopsy on 03/19/2014 which was diagnostic for diffuse large B-cell lymphoma, non-germinal center type.  Immunophenotypically, this was similar to her previous diffuse large B-cell lymphoma, and this was presumed CNS relapse.  She was started on MT-R (methotrexate, temozolomide and rituximab) therapy in 03/2014.  Her course was complicated by line-related DVT, and she completed a course of enoxaparin for this.  She attained a complete clinical and radiographic response after completing the MT-R treatment regimen and EA consolidation in 8/2014.  She is here for ongoing follow up.    She had viral gastroenteritis with fever, chills, nausea, vomiting approximately 2 weeks ago, and has been symptomatically treated by Dr. Flor on 2/14.  After visiting Dr. Flor's office, she has been feeling better and her symptoms has been subsiding.  She denies CNS symptoms including headache, numbness, weakness, visual disturbance, dizziness.  She denies systemic constitutional symptoms including fever, chills, anorexia, abnormal weight loss, night sweats.     REVIEW OF SYSTEMS:  A complete 10-point review of systems was negative other than noted.    MEDICATIONS:  Current Outpatient Prescriptions   Medication     acetaminophen (TYLENOL) 325 MG tablet     zolpidem (AMBIEN) 5 MG tablet     ranitidine (ZANTAC) 150 MG tablet      omeprazole (PRILOSEC) 20 MG CR capsule     Multiple Vitamin (MULTI-VITAMINS) TABS     magnesium gluconate (MAGONATE) 500 MG tablet     Dextromethorphan-guaiFENesin  MG/5ML syrup     fluticasone (FLONASE) 50 MCG/ACT spray     cetirizine (ZYRTEC) 10 MG tablet     Calcium carb-Vitamin D 500 mg [place]-200 units (OYSTER SHELL CALCIUM/D) 500-200 MG-UNIT per tablet     aspirin EC 81 MG EC tablet     ammonium lactate (AMLACTIN) 12 % cream     acetaminophen-codeine (TYLENOL #3) 300-30 MG per tablet     Acetaminophen (TYLENOL PO)     CYANOCOBALAMIN PO     Skin Protectants, Misc. (CRITIC-AID CLEAR) OINT     ORDER FOR DME     No current facility-administered medications for this visit.      PHYSICAL EXAMINATION:  /65 (BP Location: Right arm, Patient Position: Sitting, Cuff Size: Adult Regular)  Pulse 62  Temp 98.5  F (36.9  C) (Oral)  Resp 16  Wt 61.2 kg (135 lb)  SpO2 99%  BMI 27.27 kg/m2  Wt Readings from Last 5 Encounters:   02/19/18 61.2 kg (135 lb)   08/21/17 62.4 kg (137 lb 9.1 oz)   02/20/17 63.9 kg (140 lb 12.8 oz)   08/22/16 63.5 kg (140 lb)   05/23/16 66.1 kg (145 lb 11.6 oz)     General: Well appearing  female. No acute distress.  HEENT: Sclerae anicteric. No OP lesions, masses, or tonsilar enlargement.  Lungs: Clear bilaterally without wheezing or crackles.  Heart: Regular rate and rhythm without murmurs.  Gastrointestinal: Bowel sounds present, no tenderness to palpation, spleen tip not palpable. Exam limited by habitus.  Extremities: No lower extremity edema.  Lymph:  No cervical, clavicular, axillary, epitrochlear, or inguinal lymphadenopathy.  Neuro: Grossly non-focal.  Skin/access: No visible rash. No IV access.  Performance status: ECOG 1    LABORATORY DATA:  Recent Labs   Lab Test  02/19/18   0937  08/21/17   0749  02/20/17   0927   09/05/14   0715  09/04/14   0552  09/03/14   0734   WBC  6.4  7.0  5.3   < >  4.6  6.5  8.0   HGB  13.3  12.3  12.9   < >  10.6*  10.1*  10.2*   PLT  194  193   199   < >  131*  78*  58*   ANEU  3.7  4.6  2.7   < >  3.2  4.8  6.6   ABLA   --    --    --    --   0.0  0.1*  0.3*   ALYM  2.3  1.7  2.2   < >  0.5*  0.5*  0.2*    < > = values in this interval not displayed.     Recent Labs   Lab Test  02/19/18   0937  08/21/17   0749  02/20/17   0927   09/09/14   0825  09/05/14   0715   08/29/14   0113   08/28/14   1230   07/28/14   1007   06/30/14   0955   06/20/14   0437   NA  142  141  143   < >  140  143   < >   --    --    --    < >  141   < >  144   < >  143   POTASSIUM  3.3*  3.6  3.3*   < >  3.9  3.8   < >   --    < >   --    < >  3.4   < >  3.2*   < >  3.6   CHLORIDE  111*  107  110*   < >  105  109   < >   --    --    --    < >  105   < >  104   < >  101   CO2  21  26  21   < >  26  27   < >   --    --    --    < >  25   < >  24   < >  35*   BUN  20  18  16   < >  10  3*   < >   --    --    --    < >  13   < >  6*   < >  3*   CR  0.78  0.93  0.81   < >  0.76  0.64   < >   --    --    --    < >  0.70   < >  0.85   < >  0.82   EMMA  8.5  8.6  8.2*   < >  8.6  8.3*   < >   --    --    --    < >  9.5   < >  9.4   < >  8.3*   MAG   --    --    --    --   1.9  1.6   --    --    --   2.0   < >   --    < >   --    --    --    PHOS   --    --    --    --   4.4  2.1*   --   2.9   < >   --    < >   --    < >   --    --    --    URIC   --    --    --    --    --    --    --    --    --    --    --   6.7   --   7.1   --   3.6   LDH  160  152  164   < >   --    --    --    --    --    --    < >  447   < >  460   --   366    < > = values in this interval not displayed.     Recent Labs   Lab Test  02/19/18   0937  08/21/17   0749  02/20/17   0927  09/14/16   1208   08/28/14   0415  08/27/14   0627   AST  20  17  19   --    < >  7   --    ALT  20  26  28   --    < >  12   --    ALKPHOS  93  90  94   --    < >  96   --    BILITOTAL  0.8  0.8  1.0   --    < >  0.8   --    INR   --    --    --   1.03   --   1.22*  1.22*    < > = values in this interval not displayed.     IMPRESSION AND  PLAN:  Ms. Negro is a 68-year-old woman with past CNS relapse of DLBCL, here for followup.      In regards to her CNS relapse of DLBCL, she clinically remains in remission now almost 3 and a half years out from completing therapy.  Her labs look good, SPEP is pending (but has been negative).  We will continue to follow every 6 months with annual MRI at least until 5 years post treatment.  We discussed the importance that she notify us if any new issues come up in between visits.      She has no active infectious issues.  She is up-to-date for flu and pneumoccal immunizations.  She will be next due for repeat Pneumovax in 05/2021.       Her viral gastroenteritis has been getting better with symptomatic treatment and she does not look significantly ill today.  She will continue to work with Dr. Flor for her routine medical management.     We will plan to see her back in 6 months with MRI brain prior, and reminded her to call in the interim if questions, concerns or new issues arise.     The patient was seen and discussed with Dr. Dailey.    Tamara Lawrence MD  BMT/Hem/Onc Fellow    ATTENDING ADDENDUM:  The patient has been seen and evaluated by me.  I have reviewed today's vital signs, medications, labs, and imaging results independently, and have discussed the patient and plan with the fellow, and agree with the findings and plan outlined in this note.  The impression and plan were jointly made.    Karson Dailey MD, PhD   of Medicine  Division of Hematology, Oncology, and Transplantation  [email or web address] email  [phone] office  [phone] pager

## 2018-08-29 NOTE — PROGRESS NOTES
Oncology/Hematology Visit Note  Aug 31, 2018    Reason for Visit: Follow up of DLBCL with CNS relapse    History of Present Illness: Simran Negro is a 68 year old female with a history of DLBCL with CNS relapse. Per Dr. Dailey prior notes, her history is as follows:    To summarize her course, she was diagnosed with DLBCL of the cervix in 06/2007 and was treated with 6 cycles of R-CHOP.  She attained a CR, but in early 2014 developed difficulty concentrating, gait instability, and right-sided headaches with some blurry vision.  Workup revealed an intra-axial right parietal mass.  Stereotactic biopsy on 03/19/2014 which was diagnostic for diffuse large B-cell lymphoma, non-germinal center type. Immunophenotypically, this was similar to her previous diffuse large B-cell lymphoma, and this was presumed CNS relapse.  She was started on MT-R (methotrexate, temozolomide and rituximab) therapy in 03/2014.  Her course was complicated by line-related DVT, and she completed a course of enoxaparin for this.  She attained a complete clinical and radiographic response after completing the MT-R treatment regimen and EA consolidation in 8/2014. She has had no signs of recurrence since that time. She returns today for annual brain MRI and routine 6 month follow-up. I am seeing her in Dr. Dailey's absence.     Interval History:  Ms. Negro returns to clinic today and was seen with an . She is feeling well, though she does have return of her chronic neck/back and knee pains the last few weeks and has been working with her primary care provider on getting this controlled. She previously was having GI complaints with nausea and abdominal pain but state she underwent an endoscopy and everything was reassuring and now she feels better. She had symptoms of a UTI and is on antibiotics now. Still struggles with constipation but this is stable and she takes a medication for this, though she isn't sure which one.     She denies  "fevers or chills. Weight stable, though her appetite is slightly decreased. No headaches, dizziness, visual disturbances other than far sightedness that is ongoing, dizziness, extremity weakness, or neuropathy except for when her pain is worse, then she sometimes feels \"tingling\" in her legs. No chest pain, SOB, or edema.     Current Outpatient Prescriptions   Medication Sig Dispense Refill     Acetaminophen (TYLENOL PO) Take 325 mg by mouth       acetaminophen (TYLENOL) 325 MG tablet Take 325-650 mg by mouth       acetaminophen-codeine (TYLENOL #3) 300-30 MG per tablet        ammonium lactate (AMLACTIN) 12 % cream Apply two to three times a day as needed.       aspirin EC 81 MG EC tablet Take 81 mg by mouth       Calcium carb-Vitamin D 500 mg [place]-200 units (OYSTER SHELL CALCIUM/D) 500-200 MG-UNIT per tablet        cetirizine (ZYRTEC) 10 MG tablet Take 10 mg by mouth       CYANOCOBALAMIN PO Take 500 mcg by mouth daily       fluticasone (FLONASE) 50 MCG/ACT spray Spray 2 sprays in nostril       magnesium gluconate (MAGONATE) 500 MG tablet Take 500 mg by mouth       Multiple Vitamin (MULTI-VITAMINS) TABS        omeprazole (PRILOSEC) 20 MG CR capsule Take 20 mg by mouth       ORDER FOR DME Please dispense to take 3 Ensure/day. (Patient not taking: Reported on 2/19/2018) 90 Units 3     ranitidine (ZANTAC) 150 MG tablet Take 150 mg by mouth       Skin Protectants, Misc. (CRITIC-AID CLEAR) OINT Externally apply 1 Application topically 2 times daily (Patient not taking: Reported on 2/19/2018) 1 Tube 2     zolpidem (AMBIEN) 5 MG tablet Take 5 mg by mouth         Physical Examination:  /67 (BP Location: Right arm, Patient Position: Sitting, Cuff Size: Adult Regular)  Pulse 71  Temp 97.5  F (36.4  C) (Oral)  Resp 16  Wt 62.5 kg (137 lb 11.2 oz)  SpO2 98%  BMI 27.81 kg/m2  Wt Readings from Last 10 Encounters:   02/19/18 61.2 kg (135 lb)   08/21/17 62.4 kg (137 lb 9.1 oz)   02/20/17 63.9 kg (140 lb 12.8 oz) "   08/22/16 63.5 kg (140 lb)   05/23/16 66.1 kg (145 lb 11.6 oz)   02/15/16 64.7 kg (142 lb 10.2 oz)   11/09/15 65 kg (143 lb 4.8 oz)   08/26/15 60.8 kg (134 lb)   08/10/15 59.5 kg (131 lb 3.2 oz)   04/27/15 62.3 kg (137 lb 5.6 oz)     Constitutional: Well-appearing female in no acute distress.  Eyes: EOMI, PERRL. No scleral icterus.  ENT: Oral mucosa is moist without lesions or thrush.   Lymphatic: Neck is supple without cervical or supraclavicular lymphadenopathy. No axillary lymphadenopathy.  Cardiovascular: Regular rate and rhythm. No murmurs, gallops, or rubs. No peripheral edema.  Respiratory: Clear to auscultation bilaterally. No wheezes or crackles.  Gastrointestinal: Bowel sounds present. Abdomen soft, non-tender. No palpable hepatosplenomegaly or masses though difficult to assess 2/2 abdominal girth.  Neurologic: Cranial nerves II through XII are grossly intact.  Skin: No rashes, petechiae, or bruising noted on exposed skin.    Laboratory Data:  Results for RAKEL SANDERS (MRN [id number]) as of 8/31/2018 09:40   8/31/2018 08:48   Sodium 139   Potassium 3.3 (L)   Chloride 107   Carbon Dioxide 22   Urea Nitrogen 12   Creatinine 0.85   GFR Estimate 66   GFR Estimate If Black 80   Calcium 8.3 (L)   Anion Gap 10   Albumin 3.7   Protein Total 6.9   Bilirubin Total 1.2   Alkaline Phosphatase 82   ALT 28   AST 22   Lactate Dehydrogenase 190   Glucose 227 (H)   WBC 5.1   Hemoglobin 13.4   Hematocrit 42.7   Platelet Count 173   RBC Count 4.97   MCV 86   MCH 27.0   MCHC 31.4 (L)   RDW 13.1   Diff Method Automated Method   % Neutrophils 56.1   % Lymphocytes 34.4   % Monocytes 6.7   % Eosinophils 1.8   % Basophils 0.6   % Immature Granulocytes 0.4   Nucleated RBCs 0   Absolute Neutrophil 2.8   Absolute Lymphocytes 1.7   Absolute Monocytes 0.3   Absolute Eosinophils 0.1   Absolute Basophils 0.0   Abs Immature Granulocytes 0.0   Absolute Nucleated RBC 0.0       Assessment and Plan:  1. History of CNS relapsed DLBCL  Completed  treatment in 2014. Since that time has not had any signs of recurrence. Today MRI with no signs of recurrence which she was very happy to hear. No symptoms to suggest recurrent disease. CBC stable. LDH WNL. Protein electrophoresis pending.     She follows with her local PCP Dr. Flor for routine medical care including recent UTI symptoms and chronic back pain. She has slightly low potassium and calcium today and asked that she increase this in her diet and recheck with her primary in the next month. Blood sugar is also slightly high (has been intermittently high in the past) and asked that she recheck this with her PCP as well.    Will plan to see her back in 6 months with labs and Dr. Dailey visit.    Issac Davis PA-C  [facility]  [address] [place] [address] [phone]

## 2018-08-31 ENCOUNTER — RADIANT APPOINTMENT (OUTPATIENT)
Dept: MRI IMAGING | Facility: CLINIC | Age: 68
End: 2018-08-31
Attending: INTERNAL MEDICINE
Payer: COMMERCIAL

## 2018-08-31 ENCOUNTER — ONCOLOGY VISIT (OUTPATIENT)
Dept: ONCOLOGY | Facility: CLINIC | Age: 68
End: 2018-08-31
Attending: PHYSICIAN ASSISTANT
Payer: COMMERCIAL

## 2018-08-31 ENCOUNTER — APPOINTMENT (OUTPATIENT)
Dept: LAB | Facility: CLINIC | Age: 68
End: 2018-08-31
Attending: INTERNAL MEDICINE
Payer: COMMERCIAL

## 2018-08-31 VITALS
HEART RATE: 71 BPM | SYSTOLIC BLOOD PRESSURE: 107 MMHG | RESPIRATION RATE: 16 BRPM | DIASTOLIC BLOOD PRESSURE: 67 MMHG | WEIGHT: 137.7 LBS | OXYGEN SATURATION: 98 % | TEMPERATURE: 97.5 F | BODY MASS INDEX: 27.81 KG/M2

## 2018-08-31 DIAGNOSIS — C83.390 CENTRAL NERVOUS SYSTEM LYMPHOMA: Primary | ICD-10-CM

## 2018-08-31 DIAGNOSIS — C83.30 DLBCL (DIFFUSE LARGE B CELL LYMPHOMA) (H): ICD-10-CM

## 2018-08-31 LAB
ALBUMIN SERPL-MCNC: 3.7 G/DL (ref 3.4–5)
ALP SERPL-CCNC: 82 U/L (ref 40–150)
ALT SERPL W P-5'-P-CCNC: 28 U/L (ref 0–50)
ANION GAP SERPL CALCULATED.3IONS-SCNC: 10 MMOL/L (ref 3–14)
AST SERPL W P-5'-P-CCNC: 22 U/L (ref 0–45)
BASOPHILS # BLD AUTO: 0 10E9/L (ref 0–0.2)
BASOPHILS NFR BLD AUTO: 0.6 %
BILIRUB SERPL-MCNC: 1.2 MG/DL (ref 0.2–1.3)
BUN SERPL-MCNC: 12 MG/DL (ref 7–30)
CALCIUM SERPL-MCNC: 8.3 MG/DL (ref 8.5–10.1)
CHLORIDE SERPL-SCNC: 107 MMOL/L (ref 94–109)
CO2 SERPL-SCNC: 22 MMOL/L (ref 20–32)
CREAT SERPL-MCNC: 0.85 MG/DL (ref 0.52–1.04)
DIFFERENTIAL METHOD BLD: ABNORMAL
EOSINOPHIL # BLD AUTO: 0.1 10E9/L (ref 0–0.7)
EOSINOPHIL NFR BLD AUTO: 1.8 %
ERYTHROCYTE [DISTWIDTH] IN BLOOD BY AUTOMATED COUNT: 13.1 % (ref 10–15)
GFR SERPL CREATININE-BSD FRML MDRD: 66 ML/MIN/1.7M2
GLUCOSE SERPL-MCNC: 227 MG/DL (ref 70–99)
HCT VFR BLD AUTO: 42.7 % (ref 35–47)
HGB BLD-MCNC: 13.4 G/DL (ref 11.7–15.7)
IMM GRANULOCYTES # BLD: 0 10E9/L (ref 0–0.4)
IMM GRANULOCYTES NFR BLD: 0.4 %
LDH SERPL L TO P-CCNC: 190 U/L (ref 81–234)
LYMPHOCYTES # BLD AUTO: 1.7 10E9/L (ref 0.8–5.3)
LYMPHOCYTES NFR BLD AUTO: 34.4 %
MCH RBC QN AUTO: 27 PG (ref 26.5–33)
MCHC RBC AUTO-ENTMCNC: 31.4 G/DL (ref 31.5–36.5)
MCV RBC AUTO: 86 FL (ref 78–100)
MONOCYTES # BLD AUTO: 0.3 10E9/L (ref 0–1.3)
MONOCYTES NFR BLD AUTO: 6.7 %
NEUTROPHILS # BLD AUTO: 2.8 10E9/L (ref 1.6–8.3)
NEUTROPHILS NFR BLD AUTO: 56.1 %
NRBC # BLD AUTO: 0 10*3/UL
NRBC BLD AUTO-RTO: 0 /100
PLATELET # BLD AUTO: 173 10E9/L (ref 150–450)
POTASSIUM SERPL-SCNC: 3.3 MMOL/L (ref 3.4–5.3)
PROT SERPL-MCNC: 6.9 G/DL (ref 6.8–8.8)
RBC # BLD AUTO: 4.97 10E12/L (ref 3.8–5.2)
SODIUM SERPL-SCNC: 139 MMOL/L (ref 133–144)
WBC # BLD AUTO: 5.1 10E9/L (ref 4–11)

## 2018-08-31 PROCEDURE — 84165 PROTEIN E-PHORESIS SERUM: CPT | Performed by: INTERNAL MEDICINE

## 2018-08-31 PROCEDURE — 83615 LACTATE (LD) (LDH) ENZYME: CPT | Performed by: INTERNAL MEDICINE

## 2018-08-31 PROCEDURE — 36415 COLL VENOUS BLD VENIPUNCTURE: CPT

## 2018-08-31 PROCEDURE — 80053 COMPREHEN METABOLIC PANEL: CPT | Performed by: INTERNAL MEDICINE

## 2018-08-31 PROCEDURE — G0463 HOSPITAL OUTPT CLINIC VISIT: HCPCS | Mod: ZF

## 2018-08-31 PROCEDURE — 99213 OFFICE O/P EST LOW 20 MIN: CPT | Mod: ZP | Performed by: PHYSICIAN ASSISTANT

## 2018-08-31 PROCEDURE — 85025 COMPLETE CBC W/AUTO DIFF WBC: CPT | Performed by: INTERNAL MEDICINE

## 2018-08-31 PROCEDURE — 00000402 ZZHCL STATISTIC TOTAL PROTEIN: Performed by: INTERNAL MEDICINE

## 2018-08-31 PROCEDURE — T1013 SIGN LANG/ORAL INTERPRETER: HCPCS | Mod: U3,ZF

## 2018-08-31 RX ORDER — GADOBUTROL 604.72 MG/ML
7.5 INJECTION INTRAVENOUS ONCE
Status: COMPLETED | OUTPATIENT
Start: 2018-08-31 | End: 2018-08-31

## 2018-08-31 RX ADMIN — GADOBUTROL 6 ML: 604.72 INJECTION INTRAVENOUS at 07:33

## 2018-08-31 ASSESSMENT — PAIN SCALES - GENERAL: PAINLEVEL: SEVERE PAIN (7)

## 2018-08-31 NOTE — NURSING NOTE
"Oncology Rooming Note    August 31, 2018 9:18 AM   Simran Negro is a 68 year old female who presents for:    Chief Complaint   Patient presents with     Blood Draw     labs drawn by venipuncture by rn.  vs taken.     Oncology Clinic Visit     return for Lymphoma, MRI, labs      Initial Vitals: /67 (BP Location: Right arm, Patient Position: Sitting, Cuff Size: Adult Regular)  Pulse 71  Temp 97.5  F (36.4  C) (Oral)  Resp 16  Wt 62.5 kg (137 lb 11.2 oz)  SpO2 98%  BMI 27.81 kg/m2 Estimated body mass index is 27.81 kg/(m^2) as calculated from the following:    Height as of 8/22/16: 1.499 m (4' 11\").    Weight as of this encounter: 62.5 kg (137 lb 11.2 oz). Body surface area is 1.61 meters squared.  Severe Pain (7) Comment: back, legs   No LMP recorded. Patient is postmenopausal.  Allergies reviewed: Yes  Medications reviewed: Yes    Medications: Medication refills not needed today.  Pharmacy name entered into [facility]:    [facility] - [place], [place] - [address] [facility] ([facility] ONLY [facility]) - [place], [place] - [address] [place] [address]    Clinical concerns: pain started a week ago , neck, legs, pt stated her medications are the same , per patient   Issac  was notified.    6 minutes for nursing intake (face to face time)     Carla Valdivia MA              "

## 2018-08-31 NOTE — NURSING NOTE
Chief Complaint   Patient presents with     Blood Draw     labs drawn by venipuncture by rn.  vs taken.     Labs drawn by venipuncture by rn.  Vital signs taken.  Pt checked in to next appointment.  Amira Ashby RN

## 2018-08-31 NOTE — LETTER
8/31/2018      RE: Simran Negro  [address]   [facility] [address]       Oncology/Hematology Visit Note  Aug 31, 2018    Reason for Visit: Follow up of DLBCL with CNS relapse    History of Present Illness: Simran Negro is a 68 year old female with a history of DLBCL with CNS relapse. Per Dr. Dailey prior notes, her history is as follows:    To summarize her course, she was diagnosed with DLBCL of the cervix in 06/2007 and was treated with 6 cycles of R-CHOP.  She attained a CR, but in early 2014 developed difficulty concentrating, gait instability, and right-sided headaches with some blurry vision.  Workup revealed an intra-axial right parietal mass.  Stereotactic biopsy on 03/19/2014 which was diagnostic for diffuse large B-cell lymphoma, non-germinal center type. Immunophenotypically, this was similar to her previous diffuse large B-cell lymphoma, and this was presumed CNS relapse.  She was started on MT-R (methotrexate, temozolomide and rituximab) therapy in 03/2014.  Her course was complicated by line-related DVT, and she completed a course of enoxaparin for this.  She attained a complete clinical and radiographic response after completing the MT-R treatment regimen and EA consolidation in 8/2014. She has had no signs of recurrence since that time. She returns today for annual brain MRI and routine 6 month follow-up. I am seeing her in Dr. Dailey's absence.     Interval History:  Ms. Negro returns to clinic today and was seen with an . She is feeling well, though she does have return of her chronic neck/back and knee pains the last few weeks and has been working with her primary care provider on getting this controlled. She previously was having GI complaints with nausea and abdominal pain but state she underwent an endoscopy and everything was reassuring and now she feels better. She had symptoms of a UTI and is on antibiotics now. Still struggles with constipation but this is stable and she  "takes a medication for this, though she isn't sure which one.     She denies fevers or chills. Weight stable, though her appetite is slightly decreased. No headaches, dizziness, visual disturbances other than far sightedness that is ongoing, dizziness, extremity weakness, or neuropathy except for when her pain is worse, then she sometimes feels \"tingling\" in her legs. No chest pain, SOB, or edema.     Current Outpatient Prescriptions   Medication Sig Dispense Refill     Acetaminophen (TYLENOL PO) Take 325 mg by mouth       acetaminophen (TYLENOL) 325 MG tablet Take 325-650 mg by mouth       acetaminophen-codeine (TYLENOL #3) 300-30 MG per tablet        ammonium lactate (AMLACTIN) 12 % cream Apply two to three times a day as needed.       aspirin EC 81 MG EC tablet Take 81 mg by mouth       Calcium carb-Vitamin D 500 mg [place]-200 units ([place]STER [place] CALCIUM/D) 500-200 MG-UNIT per tablet        cetirizine (ZYRTEC) 10 MG tablet Take 10 mg by mouth       CYANOCOBALAMIN PO Take 500 mcg by mouth daily       fluticasone (FLONASE) 50 MCG/ACT spray Spray 2 sprays in nostril       magnesium gluconate (MAGONATE) 500 MG tablet Take 500 mg by mouth       Multiple Vitamin (MULTI-VITAMINS) TABS        omeprazole (PRILOSEC) 20 MG CR capsule Take 20 mg by mouth       ORDER FOR DME Please dispense to take 3 Ensure/day. (Patient not taking: Reported on 2/19/2018) 90 Units 3     ranitidine (ZANTAC) 150 MG tablet Take 150 mg by mouth       Skin Protectants, Misc. (CRITIC-AID CLEAR) OINT Externally apply 1 Application topically 2 times daily (Patient not taking: Reported on 2/19/2018) 1 Tube 2     zolpidem (AMBIEN) 5 MG tablet Take 5 mg by mouth         Physical Examination:  /67 (BP Location: Right arm, Patient Position: Sitting, Cuff Size: Adult Regular)  Pulse 71  Temp 97.5  F (36.4  C) (Oral)  Resp 16  Wt 62.5 kg (137 lb 11.2 oz)  SpO2 98%  BMI 27.81 kg/m2  Wt Readings from Last 10 Encounters:   02/19/18 61.2 kg (135 lb) "   08/21/17 62.4 kg (137 lb 9.1 oz)   02/20/17 63.9 kg (140 lb 12.8 oz)   08/22/16 63.5 kg (140 lb)   05/23/16 66.1 kg (145 lb 11.6 oz)   02/15/16 64.7 kg (142 lb 10.2 oz)   11/09/15 65 kg (143 lb 4.8 oz)   08/26/15 60.8 kg (134 lb)   08/10/15 59.5 kg (131 lb 3.2 oz)   04/27/15 62.3 kg (137 lb 5.6 oz)     Constitutional: Well-appearing female in no acute distress.  Eyes: EOMI, PERRL. No scleral icterus.  ENT: Oral mucosa is moist without lesions or thrush.   Lymphatic: Neck is supple without cervical or supraclavicular lymphadenopathy. No axillary lymphadenopathy.  Cardiovascular: Regular rate and rhythm. No murmurs, gallops, or rubs. No peripheral edema.  Respiratory: Clear to auscultation bilaterally. No wheezes or crackles.  Gastrointestinal: Bowel sounds present. Abdomen soft, non-tender. No palpable hepatosplenomegaly or masses though difficult to assess 2/2 abdominal girth.  Neurologic: Cranial nerves II through XII are grossly intact.  Skin: No rashes, petechiae, or bruising noted on exposed skin.    Laboratory Data:  Results for RKAEL SANDERS (MRN [id number]) as of 8/31/2018 09:40   8/31/2018 08:48   Sodium 139   Potassium 3.3 (L)   Chloride 107   Carbon Dioxide 22   Urea Nitrogen 12   Creatinine 0.85   GFR Estimate 66   GFR Estimate If Black 80   Calcium 8.3 (L)   Anion Gap 10   Albumin 3.7   Protein Total 6.9   Bilirubin Total 1.2   Alkaline Phosphatase 82   ALT 28   AST 22   Lactate Dehydrogenase 190   Glucose 227 (H)   WBC 5.1   Hemoglobin 13.4   Hematocrit 42.7   Platelet Count 173   RBC Count 4.97   MCV 86   MCH 27.0   MCHC 31.4 (L)   RDW 13.1   Diff Method Automated Method   % Neutrophils 56.1   % Lymphocytes 34.4   % Monocytes 6.7   % Eosinophils 1.8   % Basophils 0.6   % Immature Granulocytes 0.4   Nucleated RBCs 0   Absolute Neutrophil 2.8   Absolute Lymphocytes 1.7   Absolute Monocytes 0.3   Absolute Eosinophils 0.1   Absolute Basophils 0.0   Abs Immature Granulocytes 0.0   Absolute Nucleated RBC 0.0        Assessment and Plan:  1. History of CNS relapsed DLBCL  Completed treatment in 2014. Since that time has not had any signs of recurrence. Today MRI with no signs of recurrence which she was very happy to hear. No symptoms to suggest recurrent disease. CBC stable. LDH WNL. Protein electrophoresis pending.     She follows with her local PCP Dr. Flor for routine medical care including recent UTI symptoms and chronic back pain. She has slightly low potassium and calcium today and asked that she increase this in her diet and recheck with her primary in the next month. Blood sugar is also slightly high (has been intermittently high in the past) and asked that she recheck this with her PCP as well.    Will plan to see her back in 6 months with labs and Dr. Dailey visit.    Issac Davis PA-C  [facility]  [address] [place] [address]  [phone]

## 2018-08-31 NOTE — MR AVS SNAPSHOT
After Visit Summary   8/31/2018    Simran Negro    MRN: [id number]           Patient Information     Date Of Birth          1950        Visit Information        Provider Department      8/31/2018 8:55 AM Zara Negro Alexis, PA [facility]        Today's Diagnoses     Central nervous system lymphoma (H)    -  1    DLBCL (diffuse large B cell lymphoma) (H)           Follow-ups after your visit        Your next 10 appointments already scheduled     Mar 04, 2019  9:00 AM CST   [facility] Draw with  [employer] [facility] DRAW   [facility] Draw ([facility])    [address] [place] [address]  [address]  [facility] [address] [phone]            Mar 04, 2019  9:30 AM CST   (Arrive by 9:15 AM)   Return Visit with Karson Dailey MD   [facility] ([facility])    [address] [place] [address]  [address]  [facility] [address] [phone]              Who to contact     If you have questions or need follow up information about today's clinic visit or your schedule please contact [facility] directly at [phone].  Normal or non-critical lab and imaging results will be communicated to you by MyChart, letter or phone within 4 business days after the clinic has received the results. If you do not hear from us within 7 days, please contact the clinic through MyChart or phone. If you have a critical or abnormal lab result, we will notify you by phone as soon as possible.  Submit refill requests through zoidu or call your pharmacy and they will forward the refill request to us. Please allow 3 business days for your refill to be completed.          Additional Information About Your Visit        [employer] Information     zoidu lets you send messages to your doctor, view your test results, renew your prescriptions, schedule appointments and more. To sign up, go to [email or web address]  "Click on \"Log in\" on the left side of the screen, which will take you to the Welcome page. Then click on \"Sign up Now\" on the right side of the page.     You will be asked to enter the access code listed below, as well as some personal information. Please follow the directions to create your username and password.     Your access code is: [email or web address]  Expires: 2/3/2019  9:26 AM     Your access code will  in 90 days. If you need help or a new code, please call your [place] clinic or [phone].        Care EveryWhere ID     This is your Care EveryWhere ID. This could be used by other organizations to access your [facility] medical records  [id number]        Your Vitals Were     Pulse Temperature Respirations Pulse Oximetry BMI (Body Mass Index)       71 97.5  F (36.4  C) (Oral) 16 98% 27.81 kg/m2        Blood Pressure from Last 3 Encounters:   18 107/67   18 107/65   17 119/70    Weight from Last 3 Encounters:   18 62.5 kg (137 lb 11.2 oz)   18 61.2 kg (135 lb)   17 62.4 kg (137 lb 9.1 oz)              We Performed the Following     CBC with platelets differential     Comprehensive metabolic panel     Lactate Dehydrogenase     Protein electrophoresis        Primary Care Provider Office Phone # Fax #    Ari Flor [phone]       [facility] [address] [address]        Equal Access to Services     [facility] HospitalKEVON AH: Hadii aad ku hadasho Soomaali, waaxda luqadaha, qaybta kaalmada adeegyada, waxtrina lam . So [facility] [phone].    ATENCIÓN: Si habla español, tiene a johnson disposición servicios gratuitos de asistencia lingüística. Llame al [phone].    We comply with applicable federal civil rights laws and [place] laws. We do not discriminate on the basis of race, color, national origin, age, disability, sex, sexual orientation, or gender identity.            Thank you!     Thank you for " [facility]  for your care. Our goal is always to provide you with excellent care. Hearing back from our patients is one way we can continue to improve our services. Please take a few minutes to complete the written survey that you may receive in the mail after your visit with us. Thank you!             Your Updated Medication List - Protect others around you: Learn how to safely use, store and throw away your medicines at www.disposemymeds.org.          This list is accurate as of 8/31/18 11:59 PM.  Always use your most recent med list.                   Brand Name Dispense Instructions for use Diagnosis    acetaminophen-codeine 300-30 MG per tablet    TYLENOL #3          ammonium lactate 12 % cream    AMLACTIN     Apply two to three times a day as needed.        aspirin 81 MG EC tablet      Take 81 mg by mouth        calcium carbonate 500 mg-vitamin D 200 units 500-200 MG-UNIT per tablet    OSCAL with D;OYSTER SHELL CALCIUM          cetirizine 10 MG tablet    zyrTEC     Take 10 mg by mouth        CRITIC-AID CLEAR Oint     1 Tube    Externally apply 1 Application topically 2 times daily    Decubitus ulcer of buttock, unspecified laterality, stage I       CYANOCOBALAMIN PO      Take 500 mcg by mouth daily        fluticasone 50 MCG/ACT spray    FLONASE     Spray 2 sprays in nostril        magnesium gluconate 500 MG tablet    MAGONATE     Take 500 mg by mouth        MULTI-VITAMINS Tabs           omeprazole 20 MG CR capsule    priLOSEC     Take 20 mg by mouth        order for DME     90 Units    Please dispense to take 3 Ensure/day.    Anorexia, DLBCL (diffuse large B cell lymphoma) (H)       ranitidine 150 MG tablet    ZANTAC     Take 150 mg by mouth        * TYLENOL PO      Take 325 mg by mouth        * acetaminophen 325 MG tablet    TYLENOL     Take 325-650 mg by mouth        zolpidem 5 MG tablet    AMBIEN     Take 5 mg by mouth        * Notice:  This list has 2 medication(s) that are the  same as other medications prescribed for you. Read the directions carefully, and ask your doctor or other care provider to review them with you.

## 2018-08-31 NOTE — DISCHARGE INSTRUCTIONS
MRI Contrast Discharge Instructions    The IV contrast you received today will pass out of your body in your  urine. This will happen in the next 24 hours. You will not feel this process.  Your urine will not change color.    Drink at least 4 extra glasses of water or juice today (unless your doctor  has restricted your fluids). This reduces the stress on your kidneys.  You may take your regular medicines.    If you are on dialysis: It is best to have dialysis today.    If you have a reaction: Most reactions happen right away. If you have  any new symptoms after leaving the hospital (such as hives or swelling),  call your hospital at the correct number below. Or call your family doctor.  If you have breathing distress or wheezing, call 911.    Special instructions: ***    I have read and understand the above information.    Signature:______________________________________ Date:___________    Staff:__________________________________________ Date:___________     Time:__________    [facility] Departments:    ___[place]: [phone]  ___[facility]: [phone]  ___[place]: [phone] ___[facility]: [phone]  ___[facility]: [phone]  ___[facility]: [phone]  ___[phone]  ___[facility]: [phone]  ___[phone]

## 2018-09-04 LAB
ALBUMIN SERPL ELPH-MCNC: 4.3 G/DL (ref 3.7–5.1)
ALPHA1 GLOB SERPL ELPH-MCNC: 0.2 G/DL (ref 0.2–0.4)
ALPHA2 GLOB SERPL ELPH-MCNC: 0.7 G/DL (ref 0.5–0.9)
B-GLOBULIN SERPL ELPH-MCNC: 0.7 G/DL (ref 0.6–1)
GAMMA GLOB SERPL ELPH-MCNC: 0.9 G/DL (ref 0.7–1.6)
M PROTEIN SERPL ELPH-MCNC: 0 G/DL
PROT PATTERN SERPL ELPH-IMP: NORMAL

## 2019-03-01 NOTE — PROGRESS NOTES
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     HISTORY OF PRESENT ILLNESS:  Ms. Negro is a 69 year old [facility] [place] here for follow up of CNS relapse of DLBCL.  She was seen with a [facility]  [place].  To summarize her course, she was diagnosed with DLBCL of the cervix in 06/2007 and was treated with 6 cycles of R-CHOP.  She attained a CR, but in early 2014 developed difficulty concentrating, gait instability, and right-sided headaches with some blurry vision.  Workup revealed an intra-axial right parietal mass.  Stereotactic biopsy on 03/19/2014 which was diagnostic for diffuse large B-cell lymphoma, non-germinal center type.  Immunophenotypically, this was similar to her previous diffuse large B-cell lymphoma, and this was presumed CNS relapse.  She was started on MT-R (methotrexate, temozolomide and rituximab) therapy in 03/2014.  Her course was complicated by line-related DVT, and she completed a course of enoxaparin for this.  She attained a complete clinical and radiographic response after completing the MT-R treatment regimen and EA consolidation in 8/2014.  She is here for ongoing follow up.    She has waxing and waning fatigue that is manageable.  Weight stable.  No fevers, chills, or night sweats.  No headache, vision changes, focal weakness or sensory changes.  No dyspnea, cough, or chest pain.  Normal bowel function.  No urinary complaints.  No bleeding, bruising, or skin rashes.  No pain.  No lumps or bumps.  Managing well.     REVIEW OF SYSTEMS:  A complete review of systems was negative other than noted.    MEDICATIONS:  Current Outpatient Medications   Medication     Acetaminophen (TYLENOL PO)     acetaminophen (TYLENOL) 325 MG tablet     acetaminophen-codeine (TYLENOL #3) 300-30 MG per tablet     ammonium lactate (AMLACTIN) 12 % cream     aspirin EC 81 MG EC tablet     Calcium carb-Vitamin D 500 mg [place]-200 units (OYSTER SHELL CALCIUM/D) 500-200  "MG-UNIT per tablet     cetirizine (ZYRTEC) 10 MG tablet     CYANOCOBALAMIN PO     fluticasone (FLONASE) 50 MCG/ACT spray     magnesium gluconate (MAGONATE) 500 MG tablet     Multiple Vitamin (MULTI-VITAMINS) TABS     omeprazole (PRILOSEC) 20 MG CR capsule     ORDER FOR DME     ranitidine (ZANTAC) 150 MG tablet     Skin Protectants, Misc. (CRITIC-AID CLEAR) OINT     zolpidem (AMBIEN) 5 MG tablet     No current facility-administered medications for this visit.      PHYSICAL EXAMINATION:  BP 95/56 (BP Location: Right arm, Patient Position: Sitting, Cuff Size: Adult Regular)   Pulse 57   Temp 98.5  F (36.9  C) (Oral)   Ht 1.499 m (4' 11.02\")   Wt 61.6 kg (135 lb 12.8 oz)   SpO2 99%   BMI 27.41 kg/m    Wt Readings from Last 5 Encounters:   03/04/19 61.6 kg (135 lb 12.8 oz)   08/31/18 62.5 kg (137 lb 11.2 oz)   02/19/18 61.2 kg (135 lb)   08/21/17 62.4 kg (137 lb 9.1 oz)   02/20/17 63.9 kg (140 lb 12.8 oz)     General: Well appearing  female. No acute distress.  HEENT: Sclerae anicteric. No OP lesions, masses, or tonsilar enlargement.  Lungs: Clear bilaterally without wheezing or crackles.  Heart: Regular rate and rhythm without murmurs.  Gastrointestinal: Bowel sounds present, no tenderness to palpation, spleen tip not palpable. Exam limited by habitus.  Extremities: No lower extremity edema.  Lymph:  No cervical, clavicular, axillary, epitrochlear, or inguinal lymphadenopathy.  Neuro: Grossly non-focal.  Skin/access: No visible rash. No IV access.  Performance status: ECOG 1    LABORATORY DATA:  Recent Labs   Lab Test 03/04/19  0943 08/31/18  0848 02/19/18  0937  09/05/14  0715 09/04/14  0552 09/03/14  0734   WBC 6.1 5.1 6.4   < > 4.6 6.5 8.0   HGB 12.9 13.4 13.3   < > 10.6* 10.1* 10.2*    173 194   < > 131* 78* 58*   ANEU 3.1 2.8 3.7   < > 3.2 4.8 6.6   ABLA  --   --   --   --  0.0 0.1* 0.3*   ALYM 2.6 1.7 2.3   < > 0.5* 0.5* 0.2*    < > = values in this interval not displayed.     Recent Labs   Lab " Test 03/04/19  0943 08/31/18  0848 02/19/18  0937  09/09/14  0825 09/05/14  0715  08/29/14  0113  08/28/14  1230  07/28/14  1007  06/30/14  0955  06/20/14  0437    139 142   < > 140 143   < >  --   --   --    < > 141   < > 144   < > 143   POTASSIUM 3.9 3.3* 3.3*   < > 3.9 3.8   < >  --    < >  --    < > 3.4   < > 3.2*   < > 3.6   CHLORIDE 107 107 111*   < > 105 109   < >  --   --   --    < > 105   < > 104   < > 101   CO2 24 22 21   < > 26 27   < >  --   --   --    < > 25   < > 24   < > 35*   BUN 12 12 20   < > 10 3*   < >  --   --   --    < > 13   < > 6*   < > 3*   CR 0.74 0.85 0.78   < > 0.76 0.64   < >  --   --   --    < > 0.70   < > 0.85   < > 0.82   EMMA 8.4* 8.3* 8.5   < > 8.6 8.3*   < >  --   --   --    < > 9.5   < > 9.4   < > 8.3*   MAG  --   --   --   --  1.9 1.6  --   --   --  2.0   < >  --    < >  --   --   --    PHOS  --   --   --   --  4.4 2.1*  --  2.9   < >  --    < >  --    < >  --   --   --    URIC  --   --   --   --   --   --   --   --   --   --   --  6.7  --  7.1  --  3.6    190 160   < >  --   --   --   --   --   --    < > 447   < > 460  --  366    < > = values in this interval not displayed.     Recent Labs   Lab Test 03/04/19  0943 08/31/18  0848 02/19/18  0937  09/14/16  1208  08/28/14  0415 08/27/14  0627   AST 20 22 20   < >  --    < > 7  --    ALT 25 28 20   < >  --    < > 12  --    ALKPHOS 96 82 93   < >  --    < > 96  --    BILITOTAL 1.2 1.2 0.8   < >  --    < > 0.8  --    INR  --   --   --   --  1.03  --  1.22* 1.22*    < > = values in this interval not displayed.     IMPRESSION AND PLAN:  Ms. Negro is a 69 year old woman with past CNS relapse of DLBCL, here for followup.      In regards to her CNS relapse of DLBCL, she clinically remains in remission now about 4 and a half years out from completing therapy.  Her labs look good, SPEP is pending (but has been negative).  I will see her back in about 6 months with an MRI prior.  If there are no new issues we will discuss  stopping scheduled follow up.  We discussed the importance that she notify us if any new issues come up in between visits.      She has no active infectious issues.  She is up-to-date for flu and pneumoccal immunizations.  She will be next due for repeat Pneumovax in 05/2021.       She will continue to work with Dr. Flor for her routine medical management.     We will plan to see her back in 6 months with MRI brain prior, and reminded her to call in the interim if questions, concerns or new issues arise.      Karson Dailey MD, PhD  Attending Physician, [facility]   of Medicine, [facility]  Division of Hematology, Oncology, and Transplantation  [email or web address] email  [phone] clinic  [phone] office  [phone] pager

## 2019-03-04 ENCOUNTER — ONCOLOGY VISIT (OUTPATIENT)
Dept: ONCOLOGY | Facility: CLINIC | Age: 69
End: 2019-03-04
Attending: INTERNAL MEDICINE
Payer: COMMERCIAL

## 2019-03-04 ENCOUNTER — APPOINTMENT (OUTPATIENT)
Dept: LAB | Facility: CLINIC | Age: 69
End: 2019-03-04
Attending: INTERNAL MEDICINE
Payer: COMMERCIAL

## 2019-03-04 VITALS
SYSTOLIC BLOOD PRESSURE: 95 MMHG | HEIGHT: 59 IN | HEART RATE: 57 BPM | BODY MASS INDEX: 27.38 KG/M2 | OXYGEN SATURATION: 99 % | DIASTOLIC BLOOD PRESSURE: 56 MMHG | WEIGHT: 135.8 LBS | TEMPERATURE: 98.5 F

## 2019-03-04 DIAGNOSIS — C83.390 CENTRAL NERVOUS SYSTEM LYMPHOMA: ICD-10-CM

## 2019-03-04 LAB
ALBUMIN SERPL-MCNC: 3.6 G/DL (ref 3.4–5)
ALP SERPL-CCNC: 96 U/L (ref 40–150)
ALT SERPL W P-5'-P-CCNC: 25 U/L (ref 0–50)
ANION GAP SERPL CALCULATED.3IONS-SCNC: 7 MMOL/L (ref 3–14)
AST SERPL W P-5'-P-CCNC: 20 U/L (ref 0–45)
BASOPHILS # BLD AUTO: 0 10E9/L (ref 0–0.2)
BASOPHILS NFR BLD AUTO: 0.3 %
BILIRUB SERPL-MCNC: 1.2 MG/DL (ref 0.2–1.3)
BUN SERPL-MCNC: 12 MG/DL (ref 7–30)
CALCIUM SERPL-MCNC: 8.4 MG/DL (ref 8.5–10.1)
CHLORIDE SERPL-SCNC: 107 MMOL/L (ref 94–109)
CO2 SERPL-SCNC: 24 MMOL/L (ref 20–32)
CREAT SERPL-MCNC: 0.74 MG/DL (ref 0.52–1.04)
DIFFERENTIAL METHOD BLD: ABNORMAL
EOSINOPHIL # BLD AUTO: 0.1 10E9/L (ref 0–0.7)
EOSINOPHIL NFR BLD AUTO: 1.3 %
ERYTHROCYTE [DISTWIDTH] IN BLOOD BY AUTOMATED COUNT: 12.4 % (ref 10–15)
GFR SERPL CREATININE-BSD FRML MDRD: 84 ML/MIN/{1.73_M2}
GLUCOSE SERPL-MCNC: 130 MG/DL (ref 70–99)
HCT VFR BLD AUTO: 41.1 % (ref 35–47)
HGB BLD-MCNC: 12.9 G/DL (ref 11.7–15.7)
IMM GRANULOCYTES # BLD: 0 10E9/L (ref 0–0.4)
IMM GRANULOCYTES NFR BLD: 0.3 %
LDH SERPL L TO P-CCNC: 184 U/L (ref 81–234)
LYMPHOCYTES # BLD AUTO: 2.6 10E9/L (ref 0.8–5.3)
LYMPHOCYTES NFR BLD AUTO: 43 %
MCH RBC QN AUTO: 26.4 PG (ref 26.5–33)
MCHC RBC AUTO-ENTMCNC: 31.4 G/DL (ref 31.5–36.5)
MCV RBC AUTO: 84 FL (ref 78–100)
MONOCYTES # BLD AUTO: 0.3 10E9/L (ref 0–1.3)
MONOCYTES NFR BLD AUTO: 4.7 %
NEUTROPHILS # BLD AUTO: 3.1 10E9/L (ref 1.6–8.3)
NEUTROPHILS NFR BLD AUTO: 50.4 %
NRBC # BLD AUTO: 0 10*3/UL
NRBC BLD AUTO-RTO: 0 /100
PLATELET # BLD AUTO: 211 10E9/L (ref 150–450)
POTASSIUM SERPL-SCNC: 3.9 MMOL/L (ref 3.4–5.3)
PROT SERPL-MCNC: 7 G/DL (ref 6.8–8.8)
RBC # BLD AUTO: 4.88 10E12/L (ref 3.8–5.2)
SODIUM SERPL-SCNC: 138 MMOL/L (ref 133–144)
WBC # BLD AUTO: 6.1 10E9/L (ref 4–11)

## 2019-03-04 PROCEDURE — 83615 LACTATE (LD) (LDH) ENZYME: CPT | Performed by: PHYSICIAN ASSISTANT

## 2019-03-04 PROCEDURE — 80053 COMPREHEN METABOLIC PANEL: CPT | Performed by: PHYSICIAN ASSISTANT

## 2019-03-04 PROCEDURE — 00000402 ZZHCL STATISTIC TOTAL PROTEIN: Performed by: PHYSICIAN ASSISTANT

## 2019-03-04 PROCEDURE — G0463 HOSPITAL OUTPT CLINIC VISIT: HCPCS | Mod: ZF

## 2019-03-04 PROCEDURE — 36415 COLL VENOUS BLD VENIPUNCTURE: CPT

## 2019-03-04 PROCEDURE — 84165 PROTEIN E-PHORESIS SERUM: CPT | Performed by: PHYSICIAN ASSISTANT

## 2019-03-04 PROCEDURE — 85025 COMPLETE CBC W/AUTO DIFF WBC: CPT | Performed by: PHYSICIAN ASSISTANT

## 2019-03-04 PROCEDURE — 99213 OFFICE O/P EST LOW 20 MIN: CPT | Mod: ZP | Performed by: INTERNAL MEDICINE

## 2019-03-04 ASSESSMENT — PAIN SCALES - GENERAL: PAINLEVEL: NO PAIN (0)

## 2019-03-04 ASSESSMENT — MIFFLIN-ST. JEOR: SCORE: 1056.86

## 2019-03-04 NOTE — NURSING NOTE
"Oncology Rooming Note    March 4, 2019 10:18 AM   Simran Negro is a 69 year old female who presents for:    Chief Complaint   Patient presents with     Blood Draw     Labs and vitals done by MA, Patient checked in for appointment.      Oncology Clinic Visit     Visit related to CNS Lymphoma     Initial Vitals: BP 95/56 (BP Location: Right arm, Patient Position: Sitting, Cuff Size: Adult Regular)   Pulse 57   Temp 98.5  F (36.9  C) (Oral)   Ht 1.499 m (4' 11.02\")   Wt 61.6 kg (135 lb 12.8 oz)   SpO2 99%   BMI 27.41 kg/m   Estimated body mass index is 27.41 kg/m  as calculated from the following:    Height as of this encounter: 1.499 m (4' 11.02\").    Weight as of this encounter: 61.6 kg (135 lb 12.8 oz). Body surface area is 1.6 meters squared.  No Pain (0) Comment: Data Unavailable   No LMP recorded. Patient is postmenopausal.  Allergies reviewed: Yes  Medications reviewed: Yes    Medications: Medication refills not needed today.  Pharmacy name entered into [facility]:    [facility] - [place], [place] - [address] (USE ONLY [facility]) - [place], [place] - [address] [place] [address]    Clinical concerns: Yes,Patientstates she has a poor appetite at times. Dr Dailey was notified.      OSBALDO EUCEDA, KELSEA            "

## 2019-03-04 NOTE — LETTER
3/4/2019     RE: Simran Negro  [address]   [facility] [address]     Dear Colleague,    Thank you for referring your patient, Simran Negro, to the [facility]. Please see a copy of my visit note below.    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     HISTORY OF PRESENT ILLNESS:  Ms. Negro is a 69 year old [facility] female here for follow up of CNS relapse of DLBCL.  She was seen with a [facility]  today.  To summarize her course, she was diagnosed with DLBCL of the cervix in 06/2007 and was treated with 6 cycles of R-CHOP.  She attained a CR, but in early 2014 developed difficulty concentrating, gait instability, and right-sided headaches with some blurry vision.  Workup revealed an intra-axial right parietal mass.  Stereotactic biopsy on 03/19/2014 which was diagnostic for diffuse large B-cell lymphoma, non-germinal center type.  Immunophenotypically, this was similar to her previous diffuse large B-cell lymphoma, and this was presumed CNS relapse.  She was started on MT-R (methotrexate, temozolomide and rituximab) therapy in 03/2014.  Her course was complicated by line-related DVT, and she completed a course of enoxaparin for this.  She attained a complete clinical and radiographic response after completing the MT-R treatment regimen and EA consolidation in 8/2014.  She is here for ongoing follow up.    She has waxing and waning fatigue that is manageable.  Weight stable.  No fevers, chills, or night sweats.  No headache, vision changes, focal weakness or sensory changes.  No dyspnea, cough, or chest pain.  Normal bowel function.  No urinary complaints.  No bleeding, bruising, or skin rashes.  No pain.  No lumps or bumps.  Managing well.     REVIEW OF SYSTEMS:  A complete review of systems was negative other than noted.    MEDICATIONS:  Current Outpatient Medications   Medication     Acetaminophen (TYLENOL PO)     acetaminophen (TYLENOL) 325  "MG tablet     acetaminophen-codeine (TYLENOL #3) 300-30 MG per tablet     ammonium lactate (AMLACTIN) 12 % cream     aspirin EC 81 MG EC tablet     Calcium carb-Vitamin D 500 mg [place]-200 units (OYSTER SHELL CALCIUM/D) 500-200 MG-UNIT per tablet     cetirizine (ZYRTEC) 10 MG tablet     CYANOCOBALAMIN PO     fluticasone (FLONASE) 50 MCG/ACT spray     magnesium gluconate (MAGONATE) 500 MG tablet     Multiple Vitamin (MULTI-VITAMINS) TABS     omeprazole (PRILOSEC) 20 MG CR capsule     ORDER FOR DME     ranitidine (ZANTAC) 150 MG tablet     Skin Protectants, Misc. (CRITIC-AID CLEAR) OINT     zolpidem (AMBIEN) 5 MG tablet     No current facility-administered medications for this visit.      PHYSICAL EXAMINATION:  BP 95/56 (BP Location: Right arm, Patient Position: Sitting, Cuff Size: Adult Regular)   Pulse 57   Temp 98.5  F (36.9  C) (Oral)   Ht 1.499 m (4' 11.02\")   Wt 61.6 kg (135 lb 12.8 oz)   SpO2 99%   BMI 27.41 kg/m     Wt Readings from Last 5 Encounters:   03/04/19 61.6 kg (135 lb 12.8 oz)   08/31/18 62.5 kg (137 lb 11.2 oz)   02/19/18 61.2 kg (135 lb)   08/21/17 62.4 kg (137 lb 9.1 oz)   02/20/17 63.9 kg (140 lb 12.8 oz)     General: Well appearing  female. No acute distress.  HEENT: Sclerae anicteric. No OP lesions, masses, or tonsilar enlargement.  Lungs: Clear bilaterally without wheezing or crackles.  Heart: Regular rate and rhythm without murmurs.  Gastrointestinal: Bowel sounds present, no tenderness to palpation, spleen tip not palpable. Exam limited by habitus.  Extremities: No lower extremity edema.  Lymph:  No cervical, clavicular, axillary, epitrochlear, or inguinal lymphadenopathy.  Neuro: Grossly non-focal.  Skin/access: No visible rash. No IV access.  Performance status: ECOG 1    LABORATORY DATA:  Recent Labs   Lab Test 03/04/19  0943 08/31/18  0848 02/19/18  0937  09/05/14  0715 09/04/14  0552 09/03/14  0734   WBC 6.1 5.1 6.4   < > 4.6 6.5 8.0   HGB 12.9 13.4 13.3   < > 10.6* 10.1* 10.2* "    173 194   < > 131* 78* 58*   ANEU 3.1 2.8 3.7   < > 3.2 4.8 6.6   ABLA  --   --   --   --  0.0 0.1* 0.3*   ALYM 2.6 1.7 2.3   < > 0.5* 0.5* 0.2*    < > = values in this interval not displayed.     Recent Labs   Lab Test 03/04/19  0943 08/31/18  0848 02/19/18  0937  09/09/14  0825 09/05/14  0715  08/29/14  0113  08/28/14  1230  07/28/14  1007  06/30/14  0955  06/20/14  0437    139 142   < > 140 143   < >  --   --   --    < > 141   < > 144   < > 143   POTASSIUM 3.9 3.3* 3.3*   < > 3.9 3.8   < >  --    < >  --    < > 3.4   < > 3.2*   < > 3.6   CHLORIDE 107 107 111*   < > 105 109   < >  --   --   --    < > 105   < > 104   < > 101   CO2 24 22 21   < > 26 27   < >  --   --   --    < > 25   < > 24   < > 35*   BUN 12 12 20   < > 10 3*   < >  --   --   --    < > 13   < > 6*   < > 3*   CR 0.74 0.85 0.78   < > 0.76 0.64   < >  --   --   --    < > 0.70   < > 0.85   < > 0.82   EMMA 8.4* 8.3* 8.5   < > 8.6 8.3*   < >  --   --   --    < > 9.5   < > 9.4   < > 8.3*   MAG  --   --   --   --  1.9 1.6  --   --   --  2.0   < >  --    < >  --   --   --    PHOS  --   --   --   --  4.4 2.1*  --  2.9   < >  --    < >  --    < >  --   --   --    URIC  --   --   --   --   --   --   --   --   --   --   --  6.7  --  7.1  --  3.6    190 160   < >  --   --   --   --   --   --    < > 447   < > 460  --  366    < > = values in this interval not displayed.     Recent Labs   Lab Test 03/04/19  0943 08/31/18  0848 02/19/18  0937  09/14/16  1208  08/28/14  0415 08/27/14  0627   AST 20 22 20   < >  --    < > 7  --    ALT 25 28 20   < >  --    < > 12  --    ALKPHOS 96 82 93   < >  --    < > 96  --    BILITOTAL 1.2 1.2 0.8   < >  --    < > 0.8  --    INR  --   --   --   --  1.03  --  1.22* 1.22*    < > = values in this interval not displayed.     IMPRESSION AND PLAN:  Ms. Negro is a 69 year old woman with past CNS relapse of DLBCL, here for followup.      In regards to her CNS relapse of DLBCL, she clinically remains in remission now  about 4 and a half years out from completing therapy.  Her labs look good, SPEP is pending (but has been negative).  I will see her back in about 6 months with an MRI prior.  If there are no new issues we will discuss stopping scheduled follow up.  We discussed the importance that she notify us if any new issues come up in between visits.      She has no active infectious issues.  She is up-to-date for flu and pneumoccal immunizations.  She will be next due for repeat Pneumovax in 05/2021.       She will continue to work with Dr. Flor for her routine medical management.     We will plan to see her back in 6 months with MRI brain prior, and reminded her to call in the interim if questions, concerns or new issues arise.      Karson Dailey MD, PhD  Attending Physician, [facility]   of Medicine, [facility]  Division of Hematology, Oncology, and Transplantation  [email or web address] email  [phone] clinic  [phone] office  [phone] pager

## 2019-03-04 NOTE — NURSING NOTE
Chief Complaint   Patient presents with     Blood Draw     Labs and vitals done by MA, Patient checked in for appointment.      SHARON Reed

## 2019-03-05 LAB
ALBUMIN SERPL ELPH-MCNC: 4.3 G/DL (ref 3.7–5.1)
ALPHA1 GLOB SERPL ELPH-MCNC: 0.3 G/DL (ref 0.2–0.4)
ALPHA2 GLOB SERPL ELPH-MCNC: 0.6 G/DL (ref 0.5–0.9)
B-GLOBULIN SERPL ELPH-MCNC: 0.7 G/DL (ref 0.6–1)
GAMMA GLOB SERPL ELPH-MCNC: 0.9 G/DL (ref 0.7–1.6)
M PROTEIN SERPL ELPH-MCNC: 0 G/DL
PROT PATTERN SERPL ELPH-IMP: NORMAL

## 2019-09-06 NOTE — PROGRESS NOTES
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     HISTORY OF PRESENT ILLNESS:  Ms. Negro is a 69 year old [facility] female here for follow up of CNS relapse of DLBCL.  She was seen with a [facility]  today.  To summarize her course, she was diagnosed with DLBCL of the cervix in 06/2007 and was treated with 6 cycles of R-CHOP.  She attained a CR, but in early 2014 developed difficulty concentrating, gait instability, and right-sided headaches with some blurry vision.  Workup revealed an intra-axial right parietal mass.  Stereotactic biopsy on 03/19/2014 which was diagnostic for diffuse large B-cell lymphoma, non-germinal center type.  Immunophenotypically this was similar to her previous diffuse large B-cell lymphoma, and this was presumed CNS relapse.  She was started on MT-R (methotrexate, temozolomide and rituximab) therapy in 03/2014.  Her course was complicated by line-related DVT, and she completed a course of enoxaparin for this.  She attained a complete clinical and radiographic response after completing the MT-R treatment regimen and EA consolidation in 8/2014.  She is here for ongoing follow up.    She has 3 to 4 months of poor appetite due to difficulty swallowing solid foods. She is able to swallow liquids. She denies N/V/D.   No fevers, chills, or night sweats.  No headache, vision changes, focal weakness or sensory changes.  No dyspnea, cough, or chest pain.  Normal bowel function.  No urinary complaints.  No bleeding, bruising, or skin rashes.  No pain.  No lumps or bumps.  Overall, managing well.     REVIEW OF SYSTEMS:  A complete review of systems was negative other than noted.    MEDICATIONS:  Current Outpatient Medications   Medication     Acetaminophen (TYLENOL PO)     acetaminophen-codeine (TYLENOL #3) 300-30 MG per tablet     ammonium lactate (AMLACTIN) 12 % cream     aspirin EC 81 MG EC tablet     Calcium carb-Vitamin D 500 mg [place]-200 units  (OYSTER SHELL CALCIUM/D) 500-200 MG-UNIT per tablet     cetirizine (ZYRTEC) 10 MG tablet     CYANOCOBALAMIN PO     fluticasone (FLONASE) 50 MCG/ACT spray     magnesium gluconate (MAGONATE) 500 MG tablet     Multiple Vitamin (MULTI-VITAMINS) TABS     omeprazole (PRILOSEC) 20 MG CR capsule     ORDER FOR DME     ranitidine (ZANTAC) 150 MG tablet     Skin Protectants, Misc. (CRITIC-AID CLEAR) OINT     zolpidem (AMBIEN) 5 MG tablet     No current facility-administered medications for this visit.      PHYSICAL EXAMINATION:  /76   Pulse 80   Temp 98.1  F (36.7  C) (Oral)   Resp 14   Wt 58.7 kg (129 lb 6.6 oz)   SpO2 96%   BMI 26.12 kg/m    Wt Readings from Last 5 Encounters:   09/09/19 58.7 kg (129 lb 6.6 oz)   03/04/19 61.6 kg (135 lb 12.8 oz)   08/31/18 62.5 kg (137 lb 11.2 oz)   02/19/18 61.2 kg (135 lb)   08/21/17 62.4 kg (137 lb 9.1 oz)     General: Well appearing  female. No acute distress.  HEENT: Sclerae anicteric. No OP lesions, masses, or tonsilar enlargement.  Lungs: Clear bilaterally without wheezing or crackles.  Heart: Regular rate and rhythm without murmurs.  Gastrointestinal: Bowel sounds present, no tenderness to palpation, spleen tip not palpable. Exam limited by habitus.  Extremities: No lower extremity edema.  Lymph:  No cervical, clavicular, axillary lymphadenopathy.  Neuro: Grossly non-focal.  Skin/access: No visible rash. No IV access.  Performance status: ECOG 1    LABORATORY DATA: no labs done today  Recent Labs   Lab Test 03/04/19  0943 08/31/18  0848 02/19/18  0937  09/05/14  0715 09/04/14  0552 09/03/14  0734   WBC 6.1 5.1 6.4   < > 4.6 6.5 8.0   HGB 12.9 13.4 13.3   < > 10.6* 10.1* 10.2*    173 194   < > 131* 78* 58*   ANEU 3.1 2.8 3.7   < > 3.2 4.8 6.6   ABLA  --   --   --   --  0.0 0.1* 0.3*   ALYM 2.6 1.7 2.3   < > 0.5* 0.5* 0.2*    < > = values in this interval not displayed.     Recent Labs   Lab Test 03/04/19  0943 08/31/18  0848 02/19/18  0937  09/09/14  0825  09/05/14  0715  08/29/14  0113  08/28/14  1230  07/28/14  1007  06/30/14  0955  06/20/14  0437    139 142   < > 140 143   < >  --   --   --    < > 141   < > 144   < > 143   POTASSIUM 3.9 3.3* 3.3*   < > 3.9 3.8   < >  --    < >  --    < > 3.4   < > 3.2*   < > 3.6   CHLORIDE 107 107 111*   < > 105 109   < >  --   --   --    < > 105   < > 104   < > 101   CO2 24 22 21   < > 26 27   < >  --   --   --    < > 25   < > 24   < > 35*   BUN 12 12 20   < > 10 3*   < >  --   --   --    < > 13   < > 6*   < > 3*   CR 0.74 0.85 0.78   < > 0.76 0.64   < >  --   --   --    < > 0.70   < > 0.85   < > 0.82   EMMA 8.4* 8.3* 8.5   < > 8.6 8.3*   < >  --   --   --    < > 9.5   < > 9.4   < > 8.3*   MAG  --   --   --   --  1.9 1.6  --   --   --  2.0   < >  --    < >  --   --   --    PHOS  --   --   --   --  4.4 2.1*  --  2.9   < >  --    < >  --    < >  --   --   --    URIC  --   --   --   --   --   --   --   --   --   --   --  6.7  --  7.1  --  3.6    190 160   < >  --   --   --   --   --   --    < > 447   < > 460  --  366    < > = values in this interval not displayed.     Recent Labs   Lab Test 03/04/19  0943 08/31/18  0848 02/19/18  0937  09/14/16  1208  08/28/14  0415 08/27/14  0627   AST 20 22 20   < >  --    < > 7  --    ALT 25 28 20   < >  --    < > 12  --    ALKPHOS 96 82 93   < >  --    < > 96  --    BILITOTAL 1.2 1.2 0.8   < >  --    < > 0.8  --    INR  --   --   --   --  1.03  --  1.22* 1.22*    < > = values in this interval not displayed.     IMPRESSION AND PLAN:  Ms. Negro is a 69 year old woman with past CNS relapse of DLBCL, here for followup.      In regards to her CNS relapse of DLBCL, she clinically remains in remission now more than 5 years out from completing therapy.  She missed her labs and MRI this morning, so we will reschedule.  We will follow up the results and contact her if there is anything to discuss.  At this point her risk of lymphoma recurrence is very low and future routine follow up with our  clinic is not necessary.  We discussed that she or her other providers can always contact us in the future if issues arise. We will get her last brain MRI and labs as she was unable to get these done today.     The cause of her poor appetite and difficulty swallowing are not clear.  She has been working with Dr. Flor for this and will see him next week.  It would be reasonable to check at CT CAP if there is no other etiologies identified but there is low suspicion that this is lymphoma related.     She has no active infectious issues.  She should get a seasonal flu shot when they are available and is up to date with pneumoccal immunizations.     She will continue to work with Dr. Flor for her general medical issues.      We will arrange labs, MRI brain, and a Survivorship visit at her convenience but will not schedule other follow up in our clinic as noted above.  We invited her to contact us if questions, concerns or new issues arise in the future.     ATTENDING ADDENDUM:  The patient has been seen and evaluated by me.  I have reviewed today's vital signs, medications, labs, and imaging results independently, and have discussed the patient and plan with the fellow, and agree with the findings and plan outlined in this note.  The impression and plan were jointly made.    Karson Dailey MD, PhD  Attending Physician, [facility]   of Medicine, [facility]  Division of Hematology, Oncology, and Transplantation  [email or web address] email  [phone] clinic  [phone] office  [phone] pager

## 2019-09-09 ENCOUNTER — ONCOLOGY VISIT (OUTPATIENT)
Dept: ONCOLOGY | Facility: CLINIC | Age: 69
End: 2019-09-09
Attending: INTERNAL MEDICINE
Payer: COMMERCIAL

## 2019-09-09 VITALS
RESPIRATION RATE: 14 BRPM | HEART RATE: 80 BPM | OXYGEN SATURATION: 96 % | SYSTOLIC BLOOD PRESSURE: 115 MMHG | WEIGHT: 129.41 LBS | BODY MASS INDEX: 26.12 KG/M2 | TEMPERATURE: 98.1 F | DIASTOLIC BLOOD PRESSURE: 76 MMHG

## 2019-09-09 DIAGNOSIS — C83.30 DIFFUSE LARGE B-CELL LYMPHOMA, UNSPECIFIED BODY REGION (H): ICD-10-CM

## 2019-09-09 DIAGNOSIS — R13.10 DYSPHAGIA, UNSPECIFIED TYPE: ICD-10-CM

## 2019-09-09 DIAGNOSIS — C83.390 CENTRAL NERVOUS SYSTEM LYMPHOMA: Primary | ICD-10-CM

## 2019-09-09 PROCEDURE — G0463 HOSPITAL OUTPT CLINIC VISIT: HCPCS | Mod: ZF

## 2019-09-09 PROCEDURE — 99214 OFFICE O/P EST MOD 30 MIN: CPT | Mod: ZP | Performed by: INTERNAL MEDICINE

## 2019-09-09 ASSESSMENT — PAIN SCALES - GENERAL: PAINLEVEL: NO PAIN (0)

## 2019-09-09 NOTE — NURSING NOTE
"Oncology Rooming Note    September 9, 2019 9:34 AM   Simran Negro is a 69 year old female who presents for:    Chief Complaint   Patient presents with     Oncology Clinic Visit     CNS Lymphoma      Initial Vitals: /76   Pulse 80   Temp 98.1  F (36.7  C) (Oral)   Resp 14   Wt 58.7 kg (129 lb 6.6 oz)   SpO2 96%   BMI 26.12 kg/m   Estimated body mass index is 26.12 kg/m  as calculated from the following:    Height as of 3/4/19: 1.499 m (4' 11.02\").    Weight as of this encounter: 58.7 kg (129 lb 6.6 oz). Body surface area is 1.56 meters squared.  No Pain (0) Comment: Data Unavailable   No LMP recorded. Patient is postmenopausal.  Allergies reviewed: Yes  Medications reviewed: Yes    Medications: Medication refills not needed today.  Pharmacy name entered into GoPollGo:    [facility] - [place], [place] - [address] [facility] (USE ONLY AT [facility]) - [place], [place] - [address] [place] [address]    Clinical concerns , no MRI , No taste for Food, cant eat much , per patient   Asim was notified.      Carla Valdivia MA              "

## 2019-09-09 NOTE — LETTER
9/9/2019       RE: Simran Negro  [address]   [facility] [address]     Dear Colleague,    Thank you for referring your patient, Simran Negro, to the [facility]. Please see a copy of my visit note below.    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     HISTORY OF PRESENT ILLNESS:  Ms. Negro is a 69 year old [facility] female here for follow up of CNS relapse of DLBCL.  She was seen with a [facility]  today.  To summarize her course, she was diagnosed with DLBCL of the cervix in 06/2007 and was treated with 6 cycles of R-CHOP.  She attained a CR, but in early 2014 developed difficulty concentrating, gait instability, and right-sided headaches with some blurry vision.  Workup revealed an intra-axial right parietal mass.  Stereotactic biopsy on 03/19/2014 which was diagnostic for diffuse large B-cell lymphoma, non-germinal center type.  Immunophenotypically this was similar to her previous diffuse large B-cell lymphoma, and this was presumed CNS relapse.  She was started on MT-R (methotrexate, temozolomide and rituximab) therapy in 03/2014.  Her course was complicated by line-related DVT, and she completed a course of enoxaparin for this.  She attained a complete clinical and radiographic response after completing the MT-R treatment regimen and EA consolidation in 8/2014.  She is here for ongoing follow up.    She has 3 to 4 months of poor appetite due to difficulty swallowing solid foods. She is able to swallow liquids. She denies N/V/D.   No fevers, chills, or night sweats.  No headache, vision changes, focal weakness or sensory changes.  No dyspnea, cough, or chest pain.  Normal bowel function.  No urinary complaints.  No bleeding, bruising, or skin rashes.  No pain.  No lumps or bumps.  Overall, managing well.     REVIEW OF SYSTEMS:  A complete review of systems was negative other than noted.    MEDICATIONS:  Current Outpatient Medications    Medication     Acetaminophen (TYLENOL PO)     acetaminophen-codeine (TYLENOL #3) 300-30 MG per tablet     ammonium lactate (AMLACTIN) 12 % cream     aspirin EC 81 MG EC tablet     Calcium carb-Vitamin D 500 mg [place]-200 units (OYSTER SHELL CALCIUM/D) 500-200 MG-UNIT per tablet     cetirizine (ZYRTEC) 10 MG tablet     CYANOCOBALAMIN PO     fluticasone (FLONASE) 50 MCG/ACT spray     magnesium gluconate (MAGONATE) 500 MG tablet     Multiple Vitamin (MULTI-VITAMINS) TABS     omeprazole (PRILOSEC) 20 MG CR capsule     ORDER FOR DME     ranitidine (ZANTAC) 150 MG tablet     Skin Protectants, Misc. (CRITIC-AID CLEAR) OINT     zolpidem (AMBIEN) 5 MG tablet     No current facility-administered medications for this visit.      PHYSICAL EXAMINATION:  /76   Pulse 80   Temp 98.1  F (36.7  C) (Oral)   Resp 14   Wt 58.7 kg (129 lb 6.6 oz)   SpO2 96%   BMI 26.12 kg/m     Wt Readings from Last 5 Encounters:   09/09/19 58.7 kg (129 lb 6.6 oz)   03/04/19 61.6 kg (135 lb 12.8 oz)   08/31/18 62.5 kg (137 lb 11.2 oz)   02/19/18 61.2 kg (135 lb)   08/21/17 62.4 kg (137 lb 9.1 oz)     General: Well appearing  female. No acute distress.  HEENT: Sclerae anicteric. No OP lesions, masses, or tonsilar enlargement.  Lungs: Clear bilaterally without wheezing or crackles.  Heart: Regular rate and rhythm without murmurs.  Gastrointestinal: Bowel sounds present, no tenderness to palpation, spleen tip not palpable. Exam limited by habitus.  Extremities: No lower extremity edema.  Lymph:  No cervical, clavicular, axillary lymphadenopathy.  Neuro: Grossly non-focal.  Skin/access: No visible rash. No IV access.  Performance status: ECOG 1    LABORATORY DATA: no labs done today  Recent Labs   Lab Test 03/04/19  0943 08/31/18  0848 02/19/18  0937  09/05/14  0715 09/04/14  0552 09/03/14  0734   WBC 6.1 5.1 6.4   < > 4.6 6.5 8.0   HGB 12.9 13.4 13.3   < > 10.6* 10.1* 10.2*    173 194   < > 131* 78* 58*   ANEU 3.1 2.8 3.7   < > 3.2  4.8 6.6   ABLA  --   --   --   --  0.0 0.1* 0.3*   ALYM 2.6 1.7 2.3   < > 0.5* 0.5* 0.2*    < > = values in this interval not displayed.     Recent Labs   Lab Test 03/04/19  0943 08/31/18  0848 02/19/18  0937  09/09/14  0825 09/05/14  0715  08/29/14  0113  08/28/14  1230  07/28/14  1007  06/30/14  0955  06/20/14  0437    139 142   < > 140 143   < >  --   --   --    < > 141   < > 144   < > 143   POTASSIUM 3.9 3.3* 3.3*   < > 3.9 3.8   < >  --    < >  --    < > 3.4   < > 3.2*   < > 3.6   CHLORIDE 107 107 111*   < > 105 109   < >  --   --   --    < > 105   < > 104   < > 101   CO2 24 22 21   < > 26 27   < >  --   --   --    < > 25   < > 24   < > 35*   BUN 12 12 20   < > 10 3*   < >  --   --   --    < > 13   < > 6*   < > 3*   CR 0.74 0.85 0.78   < > 0.76 0.64   < >  --   --   --    < > 0.70   < > 0.85   < > 0.82   EMMA 8.4* 8.3* 8.5   < > 8.6 8.3*   < >  --   --   --    < > 9.5   < > 9.4   < > 8.3*   MAG  --   --   --   --  1.9 1.6  --   --   --  2.0   < >  --    < >  --   --   --    PHOS  --   --   --   --  4.4 2.1*  --  2.9   < >  --    < >  --    < >  --   --   --    URIC  --   --   --   --   --   --   --   --   --   --   --  6.7  --  7.1  --  3.6    190 160   < >  --   --   --   --   --   --    < > 447   < > 460  --  366    < > = values in this interval not displayed.     Recent Labs   Lab Test 03/04/19  0943 08/31/18  0848 02/19/18  0937  09/14/16  1208  08/28/14  0415 08/27/14  0627   AST 20 22 20   < >  --    < > 7  --    ALT 25 28 20   < >  --    < > 12  --    ALKPHOS 96 82 93   < >  --    < > 96  --    BILITOTAL 1.2 1.2 0.8   < >  --    < > 0.8  --    INR  --   --   --   --  1.03  --  1.22* 1.22*    < > = values in this interval not displayed.     IMPRESSION AND PLAN:  Ms. Negro is a 69 year old woman with past CNS relapse of DLBCL, here for followup.      In regards to her CNS relapse of DLBCL, she clinically remains in remission now more than 5 years out from completing therapy.  She missed her  labs and MRI this morning, so we will reschedule.  We will follow up the results and contact her if there is anything to discuss.  At this point her risk of lymphoma recurrence is very low and future routine follow up with our clinic is not necessary.  We discussed that she or her other providers can always contact us in the future if issues arise. We will get her last brain MRI and labs as she was unable to get these done today.     The cause of her poor appetite and difficulty swallowing are not clear.  She has been working with Dr. Flor for this and will see him next week.  It would be reasonable to check at CT CAP if there is no other etiologies identified but there is low suspicion that this is lymphoma related.     She has no active infectious issues.  She should get a seasonal flu shot when they are available and is up to date with pneumoccal immunizations.     She will continue to work with Dr. Flor for her general medical issues.      We will arrange labs, MRI brain, and a Survivorship visit at her convenience but will not schedule other follow up in our clinic as noted above.  We invited her to contact us if questions, concerns or new issues arise in the future.     ATTENDING ADDENDUM:  The patient has been seen and evaluated by me.  I have reviewed today's vital signs, medications, labs, and imaging results independently, and have discussed the patient and plan with the fellow, and agree with the findings and plan outlined in this note.  The impression and plan were jointly made.    Karson Dailey MD, PhD  Attending Physician, [facility]   of Medicine, [facility]  Division of Hematology, Oncology, and Transplantation  [email or web address] email  [phone] clinic  [phone] office  [phone] pager

## 2019-09-11 ENCOUNTER — TELEPHONE (OUTPATIENT)
Dept: SPIRITUAL SERVICES | Facility: CLINIC | Age: 69
End: 2019-09-11

## 2019-09-11 NOTE — TELEPHONE ENCOUNTER
"SPIRITUAL HEALTH SERVICES  Telephone Encounter     Reason: Referred to contact Simran through her response to the oncology distress screen. \"Do you struggle with the loss of meaning and shahana in your life? : QUITE A BIT\"    Intervention: Reviewed documentation. I, along with a Hmong speaking , attempted to contact Simran. No one picked up and there was not a messaging option.    Plan: I have no plan for follow-up.    Ari Rdz, MPH, MDiv, [facility]  Staff      "

## 2019-09-19 ENCOUNTER — ANCILLARY PROCEDURE (OUTPATIENT)
Dept: MRI IMAGING | Facility: CLINIC | Age: 69
End: 2019-09-19
Attending: INTERNAL MEDICINE
Payer: COMMERCIAL

## 2019-09-19 DIAGNOSIS — C83.390 CENTRAL NERVOUS SYSTEM LYMPHOMA: ICD-10-CM

## 2019-09-19 LAB
ALBUMIN SERPL-MCNC: 3.6 G/DL (ref 3.4–5)
ALP SERPL-CCNC: 83 U/L (ref 40–150)
ALT SERPL W P-5'-P-CCNC: 47 U/L (ref 0–50)
ANION GAP SERPL CALCULATED.3IONS-SCNC: 6 MMOL/L (ref 3–14)
AST SERPL W P-5'-P-CCNC: 19 U/L (ref 0–45)
BASOPHILS # BLD AUTO: 0 10E9/L (ref 0–0.2)
BASOPHILS NFR BLD AUTO: 0.3 %
BILIRUB SERPL-MCNC: 0.6 MG/DL (ref 0.2–1.3)
BUN SERPL-MCNC: 19 MG/DL (ref 7–30)
CALCIUM SERPL-MCNC: 8.7 MG/DL (ref 8.5–10.1)
CHLORIDE SERPL-SCNC: 105 MMOL/L (ref 94–109)
CO2 SERPL-SCNC: 27 MMOL/L (ref 20–32)
CREAT SERPL-MCNC: 0.91 MG/DL (ref 0.52–1.04)
DIFFERENTIAL METHOD BLD: NORMAL
EOSINOPHIL # BLD AUTO: 0.1 10E9/L (ref 0–0.7)
EOSINOPHIL NFR BLD AUTO: 1.8 %
ERYTHROCYTE [DISTWIDTH] IN BLOOD BY AUTOMATED COUNT: 12.3 % (ref 10–15)
GFR SERPL CREATININE-BSD FRML MDRD: 65 ML/MIN/{1.73_M2}
GLUCOSE SERPL-MCNC: 207 MG/DL (ref 70–99)
HCT VFR BLD AUTO: 38.3 % (ref 35–47)
HGB BLD-MCNC: 12.2 G/DL (ref 11.7–15.7)
IMM GRANULOCYTES # BLD: 0 10E9/L (ref 0–0.4)
IMM GRANULOCYTES NFR BLD: 0.3 %
LDH SERPL L TO P-CCNC: 164 U/L (ref 81–234)
LYMPHOCYTES # BLD AUTO: 2.3 10E9/L (ref 0.8–5.3)
LYMPHOCYTES NFR BLD AUTO: 34.1 %
MCH RBC QN AUTO: 27.2 PG (ref 26.5–33)
MCHC RBC AUTO-ENTMCNC: 31.9 G/DL (ref 31.5–36.5)
MCV RBC AUTO: 86 FL (ref 78–100)
MONOCYTES # BLD AUTO: 0.5 10E9/L (ref 0–1.3)
MONOCYTES NFR BLD AUTO: 7 %
NEUTROPHILS # BLD AUTO: 3.9 10E9/L (ref 1.6–8.3)
NEUTROPHILS NFR BLD AUTO: 56.5 %
NRBC # BLD AUTO: 0 10*3/UL
NRBC BLD AUTO-RTO: 0 /100
PLATELET # BLD AUTO: 199 10E9/L (ref 150–450)
POTASSIUM SERPL-SCNC: 3.8 MMOL/L (ref 3.4–5.3)
PROT SERPL-MCNC: 6.9 G/DL (ref 6.8–8.8)
RADIOLOGIST FLAGS: ABNORMAL
RBC # BLD AUTO: 4.48 10E12/L (ref 3.8–5.2)
SODIUM SERPL-SCNC: 138 MMOL/L (ref 133–144)
WBC # BLD AUTO: 6.8 10E9/L (ref 4–11)

## 2019-09-19 RX ORDER — GADOBUTROL 604.72 MG/ML
7.5 INJECTION INTRAVENOUS ONCE
Status: COMPLETED | OUTPATIENT
Start: 2019-09-19 | End: 2019-09-19

## 2019-09-19 RX ADMIN — GADOBUTROL 5.5 ML: 604.72 INJECTION INTRAVENOUS at 16:30

## 2019-09-19 NOTE — DISCHARGE INSTRUCTIONS
MRI Contrast Discharge Instructions    The IV contrast you received today will pass out of your body in your  urine. This will happen in the next 24 hours. You will not feel this process.  Your urine will not change color.    Drink at least 4 extra glasses of water or juice today (unless your doctor  has restricted your fluids). This reduces the stress on your kidneys.  You may take your regular medicines.    If you are on dialysis: It is best to have dialysis today.    If you have a reaction: Most reactions happen right away. If you have  any new symptoms after leaving the hospital (such as hives or swelling),  call your hospital at the correct number below. Or call your family doctor.  If you have breathing distress or wheezing, call 911.    Special instructions: ***    I have read and understand the above information.    Signature:______________________________________ Date:___________    Staff:__________________________________________ Date:___________     Time:__________    [facility] Departments:    ___[place]: [phone]  ___[facility]: [phone]  ___[place]: [phone] ___[facility]: [phone]  ___[facility]: [phone]  ___[facility]: [phone]  ___[phone]  ___[facility]: [phone]  ___[phone]

## 2019-09-20 ENCOUNTER — CARE COORDINATION (OUTPATIENT)
Dept: ONCOLOGY | Facility: CLINIC | Age: 69
End: 2019-09-20

## 2019-09-20 ENCOUNTER — HOSPITAL ENCOUNTER (INPATIENT)
Facility: CLINIC | Age: 69
LOS: 7 days | Discharge: HOME | DRG: 841 | End: 2019-09-27
Attending: INTERNAL MEDICINE | Admitting: INTERNAL MEDICINE
Payer: COMMERCIAL

## 2019-09-20 DIAGNOSIS — C83.390 CNS LYMPHOMA: ICD-10-CM

## 2019-09-20 DIAGNOSIS — K59.09 OTHER CONSTIPATION: ICD-10-CM

## 2019-09-20 DIAGNOSIS — G93.9 BRAIN LESION: ICD-10-CM

## 2019-09-20 DIAGNOSIS — G93.89 BRAIN MASS: ICD-10-CM

## 2019-09-20 DIAGNOSIS — R10.9 ABDOMINAL CRAMPING: ICD-10-CM

## 2019-09-20 DIAGNOSIS — G93.9 BRAIN LESION: Primary | ICD-10-CM

## 2019-09-20 LAB
ALBUMIN SERPL ELPH-MCNC: 4.1 G/DL (ref 3.7–5.1)
ALBUMIN SERPL-MCNC: 3.5 G/DL (ref 3.4–5)
ALP SERPL-CCNC: 76 U/L (ref 40–150)
ALPHA1 GLOB SERPL ELPH-MCNC: 0.2 G/DL (ref 0.2–0.4)
ALPHA2 GLOB SERPL ELPH-MCNC: 0.7 G/DL (ref 0.5–0.9)
ALT SERPL W P-5'-P-CCNC: 42 U/L (ref 0–50)
ANION GAP SERPL CALCULATED.3IONS-SCNC: 11 MMOL/L (ref 3–14)
APTT PPP: 29 SEC (ref 22–37)
AST SERPL W P-5'-P-CCNC: 19 U/L (ref 0–45)
B-GLOBULIN SERPL ELPH-MCNC: 0.7 G/DL (ref 0.6–1)
BASOPHILS # BLD AUTO: 0 10E9/L (ref 0–0.2)
BASOPHILS NFR BLD AUTO: 0.3 %
BILIRUB SERPL-MCNC: 0.7 MG/DL (ref 0.2–1.3)
BUN SERPL-MCNC: 18 MG/DL (ref 7–30)
CALCIUM SERPL-MCNC: 8.6 MG/DL (ref 8.5–10.1)
CHLORIDE SERPL-SCNC: 105 MMOL/L (ref 94–109)
CO2 SERPL-SCNC: 24 MMOL/L (ref 20–32)
CREAT SERPL-MCNC: 1 MG/DL (ref 0.52–1.04)
DIFFERENTIAL METHOD BLD: NORMAL
EOSINOPHIL # BLD AUTO: 0.1 10E9/L (ref 0–0.7)
EOSINOPHIL NFR BLD AUTO: 1.2 %
ERYTHROCYTE [DISTWIDTH] IN BLOOD BY AUTOMATED COUNT: 12.4 % (ref 10–15)
FIBRINOGEN PPP-MCNC: 336 MG/DL (ref 200–420)
GAMMA GLOB SERPL ELPH-MCNC: 0.9 G/DL (ref 0.7–1.6)
GFR SERPL CREATININE-BSD FRML MDRD: 58 ML/MIN/{1.73_M2}
GLUCOSE SERPL-MCNC: 176 MG/DL (ref 70–99)
HBV SURFACE AB SERPL IA-ACNC: 2.72 M[IU]/ML
HBV SURFACE AG SERPL QL IA: NONREACTIVE
HCT VFR BLD AUTO: 38.2 % (ref 35–47)
HCV AB SERPL QL IA: NONREACTIVE
HGB BLD-MCNC: 12.3 G/DL (ref 11.7–15.7)
IMM GRANULOCYTES # BLD: 0 10E9/L (ref 0–0.4)
IMM GRANULOCYTES NFR BLD: 0.3 %
INR PPP: 1 (ref 0.86–1.14)
LDH SERPL L TO P-CCNC: 161 U/L (ref 81–234)
LYMPHOCYTES # BLD AUTO: 2 10E9/L (ref 0.8–5.3)
LYMPHOCYTES NFR BLD AUTO: 30.5 %
M PROTEIN SERPL ELPH-MCNC: 0 G/DL
MAGNESIUM SERPL-MCNC: 2.1 MG/DL (ref 1.6–2.3)
MCH RBC QN AUTO: 27.6 PG (ref 26.5–33)
MCHC RBC AUTO-ENTMCNC: 32.2 G/DL (ref 31.5–36.5)
MCV RBC AUTO: 86 FL (ref 78–100)
MONOCYTES # BLD AUTO: 0.4 10E9/L (ref 0–1.3)
MONOCYTES NFR BLD AUTO: 5.6 %
NEUTROPHILS # BLD AUTO: 4 10E9/L (ref 1.6–8.3)
NEUTROPHILS NFR BLD AUTO: 62.1 %
NRBC # BLD AUTO: 0 10*3/UL
NRBC BLD AUTO-RTO: 0 /100
PHOSPHATE SERPL-MCNC: 3.9 MG/DL (ref 2.5–4.5)
PLATELET # BLD AUTO: 208 10E9/L (ref 150–450)
POTASSIUM SERPL-SCNC: 3.8 MMOL/L (ref 3.4–5.3)
PROT PATTERN SERPL ELPH-IMP: NORMAL
PROT SERPL-MCNC: 6.8 G/DL (ref 6.8–8.8)
RBC # BLD AUTO: 4.46 10E12/L (ref 3.8–5.2)
SODIUM SERPL-SCNC: 139 MMOL/L (ref 133–144)
URATE SERPL-MCNC: 8 MG/DL (ref 2.6–6)
WBC # BLD AUTO: 6.5 10E9/L (ref 4–11)

## 2019-09-20 PROCEDURE — 99223 1ST HOSP IP/OBS HIGH 75: CPT | Mod: AI | Performed by: INTERNAL MEDICINE

## 2019-09-20 PROCEDURE — 84550 ASSAY OF BLOOD/URIC ACID: CPT | Performed by: PHYSICIAN ASSISTANT

## 2019-09-20 PROCEDURE — 87340 HEPATITIS B SURFACE AG IA: CPT | Performed by: PHYSICIAN ASSISTANT

## 2019-09-20 PROCEDURE — 86706 HEP B SURFACE ANTIBODY: CPT | Performed by: PHYSICIAN ASSISTANT

## 2019-09-20 PROCEDURE — 25000132 ZZH RX MED GY IP 250 OP 250 PS 637: Performed by: PHYSICIAN ASSISTANT

## 2019-09-20 PROCEDURE — 36415 COLL VENOUS BLD VENIPUNCTURE: CPT | Performed by: PHYSICIAN ASSISTANT

## 2019-09-20 PROCEDURE — 84100 ASSAY OF PHOSPHORUS: CPT | Performed by: PHYSICIAN ASSISTANT

## 2019-09-20 PROCEDURE — 83735 ASSAY OF MAGNESIUM: CPT | Performed by: PHYSICIAN ASSISTANT

## 2019-09-20 PROCEDURE — 85025 COMPLETE CBC W/AUTO DIFF WBC: CPT | Performed by: PHYSICIAN ASSISTANT

## 2019-09-20 PROCEDURE — 85610 PROTHROMBIN TIME: CPT | Performed by: PHYSICIAN ASSISTANT

## 2019-09-20 PROCEDURE — 25800030 ZZH RX IP 258 OP 636: Performed by: PHYSICIAN ASSISTANT

## 2019-09-20 PROCEDURE — 83615 LACTATE (LD) (LDH) ENZYME: CPT | Performed by: PHYSICIAN ASSISTANT

## 2019-09-20 PROCEDURE — 86803 HEPATITIS C AB TEST: CPT | Performed by: PHYSICIAN ASSISTANT

## 2019-09-20 PROCEDURE — 85730 THROMBOPLASTIN TIME PARTIAL: CPT | Performed by: PHYSICIAN ASSISTANT

## 2019-09-20 PROCEDURE — 12000001 ZZH R&B MED SURG/OB UMMC

## 2019-09-20 PROCEDURE — 85384 FIBRINOGEN ACTIVITY: CPT | Performed by: PHYSICIAN ASSISTANT

## 2019-09-20 PROCEDURE — 80053 COMPREHEN METABOLIC PANEL: CPT | Performed by: PHYSICIAN ASSISTANT

## 2019-09-20 RX ORDER — FLUTICASONE PROPIONATE 50 MCG
2 SPRAY, SUSPENSION (ML) NASAL DAILY PRN
Status: DISCONTINUED | OUTPATIENT
Start: 2019-09-20 | End: 2019-09-27 | Stop reason: HOSPADM

## 2019-09-20 RX ORDER — ZOLPIDEM TARTRATE 5 MG/1
5 TABLET ORAL
Status: DISCONTINUED | OUTPATIENT
Start: 2019-09-20 | End: 2019-09-27 | Stop reason: HOSPADM

## 2019-09-20 RX ORDER — POLYETHYLENE GLYCOL 3350 17 G/17G
17 POWDER, FOR SOLUTION ORAL DAILY
Status: DISCONTINUED | OUTPATIENT
Start: 2019-09-20 | End: 2019-09-27 | Stop reason: HOSPADM

## 2019-09-20 RX ORDER — ONDANSETRON 8 MG/1
8 TABLET, ORALLY DISINTEGRATING ORAL EVERY 8 HOURS PRN
Status: DISCONTINUED | OUTPATIENT
Start: 2019-09-20 | End: 2019-09-27 | Stop reason: HOSPADM

## 2019-09-20 RX ORDER — POTASSIUM CHLORIDE 29.8 MG/ML
20 INJECTION INTRAVENOUS
Status: DISCONTINUED | OUTPATIENT
Start: 2019-09-20 | End: 2019-09-27 | Stop reason: HOSPADM

## 2019-09-20 RX ORDER — POTASSIUM CHLORIDE 750 MG/1
20-40 TABLET, EXTENDED RELEASE ORAL
Status: DISCONTINUED | OUTPATIENT
Start: 2019-09-20 | End: 2019-09-27 | Stop reason: HOSPADM

## 2019-09-20 RX ORDER — LORAZEPAM 2 MG/ML
.5-1 INJECTION INTRAMUSCULAR EVERY 6 HOURS PRN
Status: DISCONTINUED | OUTPATIENT
Start: 2019-09-20 | End: 2019-09-27 | Stop reason: HOSPADM

## 2019-09-20 RX ORDER — ONDANSETRON 2 MG/ML
8 INJECTION INTRAMUSCULAR; INTRAVENOUS EVERY 8 HOURS PRN
Status: DISCONTINUED | OUTPATIENT
Start: 2019-09-20 | End: 2019-09-27 | Stop reason: HOSPADM

## 2019-09-20 RX ORDER — MAGNESIUM SULFATE HEPTAHYDRATE 40 MG/ML
4 INJECTION, SOLUTION INTRAVENOUS EVERY 4 HOURS PRN
Status: DISCONTINUED | OUTPATIENT
Start: 2019-09-20 | End: 2019-09-27 | Stop reason: HOSPADM

## 2019-09-20 RX ORDER — AMOXICILLIN 250 MG
2 CAPSULE ORAL 2 TIMES DAILY
Status: DISCONTINUED | OUTPATIENT
Start: 2019-09-20 | End: 2019-09-27 | Stop reason: HOSPADM

## 2019-09-20 RX ORDER — ALLOPURINOL 300 MG/1
300 TABLET ORAL DAILY
Status: DISCONTINUED | OUTPATIENT
Start: 2019-09-20 | End: 2019-09-20

## 2019-09-20 RX ORDER — POTASSIUM CL/LIDO/0.9 % NACL 10MEQ/0.1L
10 INTRAVENOUS SOLUTION, PIGGYBACK (ML) INTRAVENOUS
Status: DISCONTINUED | OUTPATIENT
Start: 2019-09-20 | End: 2019-09-27 | Stop reason: HOSPADM

## 2019-09-20 RX ORDER — POTASSIUM CHLORIDE 7.45 MG/ML
10 INJECTION INTRAVENOUS
Status: DISCONTINUED | OUTPATIENT
Start: 2019-09-20 | End: 2019-09-27 | Stop reason: HOSPADM

## 2019-09-20 RX ORDER — SODIUM CHLORIDE 9 MG/ML
INJECTION, SOLUTION INTRAVENOUS CONTINUOUS
Status: DISCONTINUED | OUTPATIENT
Start: 2019-09-20 | End: 2019-09-27 | Stop reason: HOSPADM

## 2019-09-20 RX ORDER — LIDOCAINE 40 MG/G
CREAM TOPICAL
Status: DISCONTINUED | OUTPATIENT
Start: 2019-09-20 | End: 2019-09-27 | Stop reason: HOSPADM

## 2019-09-20 RX ORDER — ALLOPURINOL 300 MG/1
300 TABLET ORAL DAILY
Status: DISCONTINUED | OUTPATIENT
Start: 2019-09-20 | End: 2019-09-27 | Stop reason: HOSPADM

## 2019-09-20 RX ORDER — AMOXICILLIN 250 MG
1 CAPSULE ORAL 2 TIMES DAILY
Status: DISCONTINUED | OUTPATIENT
Start: 2019-09-20 | End: 2019-09-27 | Stop reason: HOSPADM

## 2019-09-20 RX ORDER — ACETAMINOPHEN 325 MG/1
650 TABLET ORAL EVERY 4 HOURS PRN
Status: DISCONTINUED | OUTPATIENT
Start: 2019-09-20 | End: 2019-09-27 | Stop reason: HOSPADM

## 2019-09-20 RX ORDER — POTASSIUM CHLORIDE 1.5 G/1.58G
20-40 POWDER, FOR SOLUTION ORAL
Status: DISCONTINUED | OUTPATIENT
Start: 2019-09-20 | End: 2019-09-27 | Stop reason: HOSPADM

## 2019-09-20 RX ORDER — ONDANSETRON 8 MG/1
8 TABLET, FILM COATED ORAL EVERY 8 HOURS PRN
Status: DISCONTINUED | OUTPATIENT
Start: 2019-09-20 | End: 2019-09-27 | Stop reason: HOSPADM

## 2019-09-20 RX ORDER — CETIRIZINE HYDROCHLORIDE 5 MG/1
10 TABLET ORAL DAILY
Status: DISCONTINUED | OUTPATIENT
Start: 2019-09-20 | End: 2019-09-27 | Stop reason: HOSPADM

## 2019-09-20 RX ORDER — LORAZEPAM 0.5 MG/1
.5-1 TABLET ORAL EVERY 6 HOURS PRN
Status: DISCONTINUED | OUTPATIENT
Start: 2019-09-20 | End: 2019-09-27 | Stop reason: HOSPADM

## 2019-09-20 RX ORDER — MULTIPLE VITAMINS W/ MINERALS TAB 9MG-400MCG
1 TAB ORAL DAILY
Status: DISCONTINUED | OUTPATIENT
Start: 2019-09-20 | End: 2019-09-27 | Stop reason: HOSPADM

## 2019-09-20 RX ORDER — PROCHLORPERAZINE MALEATE 5 MG
5 TABLET ORAL EVERY 6 HOURS PRN
Status: DISCONTINUED | OUTPATIENT
Start: 2019-09-20 | End: 2019-09-27 | Stop reason: HOSPADM

## 2019-09-20 RX ADMIN — MULTIPLE VITAMINS W/ MINERALS TAB 1 TABLET: TAB at 16:43

## 2019-09-20 RX ADMIN — SODIUM CHLORIDE, PRESERVATIVE FREE: 5 INJECTION INTRAVENOUS at 18:11

## 2019-09-20 RX ADMIN — CETIRIZINE HYDROCHLORIDE 10 MG: 5 TABLET ORAL at 16:42

## 2019-09-20 RX ADMIN — ACETAMINOPHEN 650 MG: 325 TABLET, FILM COATED ORAL at 21:13

## 2019-09-20 RX ADMIN — RANITIDINE 150 MG: 150 TABLET ORAL at 21:14

## 2019-09-20 RX ADMIN — ALLOPURINOL 300 MG: 300 TABLET ORAL at 16:49

## 2019-09-20 RX ADMIN — ALLOPURINOL 300 MG: 300 TABLET ORAL at 16:45

## 2019-09-20 ASSESSMENT — MIFFLIN-ST. JEOR: SCORE: 1024.86

## 2019-09-20 ASSESSMENT — ACTIVITIES OF DAILY LIVING (ADL)
ADLS_ACUITY_SCORE: 22
ADLS_ACUITY_SCORE: 22
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
## 2019-09-20 NOTE — CONSULTS
[facility]       NEUROSURGERY CONSULTATION NOTE    This consultation was requested by Dr. Venegas from the Heme/Onc service.    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 She was being seen yesterday (9/19/19) for a routine follow up and a surveillance MRI was ordered.    Of note she states she has been feeling fatigued for the past 3 months. She also has been feeling a lack of appetite which caused a lot of weight loss and weakness and numbness in both of her legs. She denies any pain, trouble with balance, bowel or bladder dysfunction, or headaches. She states she feels like she is doing ok in general but is concerned about her leg symptoms and lack of appetite.    PAST MEDICAL HISTORY:   Past Medical History:   Diagnosis Date     Anxiety      Cancer (H)     brain tumor/lymphoma     Depressive disorder      Dry eye syndrome      Gallstones      Lupus (H)     Patient denies having lupus     Seizures (H)      Sjogrens syndrome (H)        PAST SURGICAL HISTORY:   Past Surgical History:   Procedure Laterality Date     CHOLECYSTECTOMY       OPTICAL TRACKING SYSTEM BIOPSY BRAIN  3/19/2014    Procedure: OPTICAL TRACKING SYSTEM BIOPSY BRAIN;  Stealth Assisted Right Parietal Tumor Biopsy ;  Surgeon: Aristeo Tran MD;  Location: [facility] OR     Power Port Placement         FAMILY HISTORY:   Family History   Problem Relation Age of Onset     Cancer No family hx of      Diabetes No family hx of        SOCIAL HISTORY:   Social History     Tobacco Use     Smoking status: Never Smoker     Smokeless tobacco: Never Used   Substance Use Topics     Alcohol use: No       MEDICATIONS:  Medications Prior to Admission   Medication Sig  "Dispense Refill Last Dose     Acetaminophen (TYLENOL PO) Take 325 mg by mouth   Taking     acetaminophen-codeine (TYLENOL #3) 300-30 MG per tablet    Not Taking     ammonium lactate (AMLACTIN) 12 % cream Apply two to three times a day as needed.   Not Taking     aspirin EC 81 MG EC tablet Take 81 mg by mouth   Not Taking     Calcium carb-Vitamin D 500 mg [place]-200 units ([place] CALCIUM/D) 500-200 MG-UNIT per tablet    Taking     cetirizine (ZYRTEC) 10 MG tablet Take 10 mg by mouth   Not Taking     CYANOCOBALAMIN PO Take 500 mcg by mouth daily   Not Taking     fluticasone (FLONASE) 50 MCG/ACT spray Spray 2 sprays in nostril   Not Taking     magnesium gluconate (MAGONATE) 500 MG tablet Take 500 mg by mouth   Not Taking     Multiple Vitamin (MULTI-VITAMINS) TABS    Not Taking     omeprazole (PRILOSEC) 20 MG CR capsule Take 20 mg by mouth   Not Taking     ORDER FOR DME Please dispense to take 3 Ensure/day. (Patient not taking: Reported on 2/19/2018) 90 Units 3 Not Taking     ranitidine (ZANTAC) 150 MG tablet Take 150 mg by mouth   Not Taking     Skin Protectants, Misc. (CRITIC-AID CLEAR) OINT Externally apply 1 Application topically 2 times daily (Patient not taking: Reported on 2/19/2018) 1 Tube 2 Not Taking     zolpidem (AMBIEN) 5 MG tablet Take 5 mg by mouth   Not Taking       Allergies:  Allergies   Allergen Reactions     Acetaminophen-Codeine Nausea and Vomiting     Patient states she is not allergic to this Tylenol w/codeine  Has taken with food and tolerated       ROS: 10 point ROS of systems including Constitutional, Eyes, Respiratory, Cardiovascular, Gastroenterology, Genitourinary, Integumentary, Muscularskeletal, Psychiatric were all negative except for pertinent positives noted in my HPI.    Physical exam:   Blood pressure 129/66, pulse 96, temperature 97.5  F (36.4  C), temperature source Oral, resp. rate 18, height 1.499 m (4' 11\"), weight 58.4 kg (128 lb 12.8 oz), SpO2 96 %, not currently " breastfeeding.  CV: Rate and BP as noted above  PULM: breathing comfortably on room air  ABD: soft, non-distended  NEUROLOGIC:  -- Awake; Alert  -- Follows commands briskly  -- Speech fluent, spontaneous. No aphasia or dysarthria.  -- no gaze preference. No apparent hemineglect.  Cranial Nerves:  -- PERRL 3-2mm bilat and brisk, extraocular movements intact  -- face symmetrical, tongue midline  -- sensory V1-V3 intact bilaterally  -- palate elevates symmetrically, uvula midline  -- hearing grossly intact bilat  -- Trapezii 5/5 strength bilat symmetric  -- Cerebellar: Finger nose finger without dysmetria    Motor:  Normal bulk / tone; no tremor, rigidity, or bradykinesia.  No muscle wasting or fasciculations     Delt Bi Tri Hand Flexion/  Extension Iliopsoas Quadriceps Hamstrings Tibialis Anterior Gastroc    C5 C6 C7 C8/T1 L2 L3 L4-S1 L4 S1   R 5 5 5 5 5 5 5 5 5   L 5 5 5 5 5 5 5 5 5   Sensory:  Diminished sensation in bilateral legs in a non dermatomal distribution    Reflexes:     Bi BR Pat Bab    C5-6 C6 L2-4 UMN   R 2+ 2+ 2+ Norm   L 2+ 2+ 2+ Norm         LABS:  Last Comprehensive Metabolic Panel:  Sodium   Date Value Ref Range Status   09/20/2019 139 133 - 144 mmol/L Final     Potassium   Date Value Ref Range Status   09/20/2019 3.8 3.4 - 5.3 mmol/L Final     Chloride   Date Value Ref Range Status   09/20/2019 105 94 - 109 mmol/L Final     Carbon Dioxide   Date Value Ref Range Status   09/20/2019 24 20 - 32 mmol/L Final     Anion Gap   Date Value Ref Range Status   09/20/2019 11 3 - 14 mmol/L Final     Glucose   Date Value Ref Range Status   09/20/2019 176 (H) 70 - 99 mg/dL Final     Urea Nitrogen   Date Value Ref Range Status   09/20/2019 18 7 - 30 mg/dL Final     Creatinine   Date Value Ref Range Status   09/20/2019 1.00 0.52 - 1.04 mg/dL Final     GFR Estimate   Date Value Ref Range Status   09/20/2019 58 (L) >60 mL/min/[1.73_m2] Final     Comment:     Non  GFR Calc  Starting 12/18/2018, serum  creatinine based estimated GFR (eGFR) will be   calculated using the Chronic Kidney Disease Epidemiology Collaboration   (CKD-EPI) equation.       Calcium   Date Value Ref Range Status   09/20/2019 8.6 8.5 - 10.1 mg/dL Final     Lab Results   Component Value Date    WBC 6.5 09/20/2019     Lab Results   Component Value Date    RBC 4.46 09/20/2019     Lab Results   Component Value Date    HGB 12.3 09/20/2019     Lab Results   Component Value Date    HCT 38.2 09/20/2019     Lab Results   Component Value Date    MCV 86 09/20/2019     Lab Results   Component Value Date    MCH 27.6 09/20/2019     Lab Results   Component Value Date    MCHC 32.2 09/20/2019     Lab Results   Component Value Date    RDW 12.4 09/20/2019     Lab Results   Component Value Date     09/20/2019       IMAGING:  MRI Brain: Contrast enhancing masses in the left frontal lobe and right caudate region. Left mass is larger, abutting the dura with dural enhancement. Right mass abutting the right lateral ventricle. Vasogenic edema present in bilateral hemispheres    ASSESSMENT:  69 year old female with history of CNS lymphoma in 2014 was in remission with a new surveillance MRI showing two new masses.    RECOMMENDATIONS:  -Recommend further CNS lymphoma workup  -We are happy to discuss the risks and benefits of open biopsy vs LP for diagnosis with primary team    The patient was discussed with Dr. Carson, neurosurgery chief resident, and Dr. Mclaughlin, neurosurgery staff, and they agree with the above.    Tyrone Coyle MD  Neurosurgery Resident, PGY-1

## 2019-09-20 NOTE — LETTER
Transition Communication Hand-off for Care Transitions to Next Level of Care Provider    Name: Simran Negro  : 1950  MRN #: [id number]  Primary Care Provider: SAM ROBLERO     Primary Clinic: [facility] [address]  [facility] [address]     Reason for Hospitalization:  recurrent lymphoma  CNS lymphoma (H)  Admit Date/Time: 2019  1:46 PM  Discharge Date: 19  Payor Source: Payor: ARE / Plan: [employer] / Product Type: HMO /     Simran Negro is a 69 year old Hmong speaking female with a history of DLBCL with CNS relapse who achieved CR in . A routine brain MRI on 19 revealed two new b/l frontal lobe lesions concerning for recurrent lymphoma. She is being admitted for further workup and management.    Discharge Plan: Home with clinic follow-up as recommended. Patient will get dexamethasone 40mg daily x 5 days with weekly rituximab (first dose to be given ). Will also plan to start radiation as outpatient.     Follow-up plan:    Future Appointments   Date Time Provider Department Center   2019  2:30 PM Ken Negro [facility]   2019 10:00 AM [place] [facility]   2019 10:00 AM [place] [facility]   2019  8:00 AM Dee Dee Hernandez [facility]   10/1/2019  8:30 AM SruthiSimran avery [facility]   10/4/2019  7:00 AM  [facility] DRAW [facility]   10/4/2019  7:30 AM Viktoria Thomson PA [facility]   10/4/2019  7:30 AM  ONCOLOGY INFUSION [facility]   10/10/2019 11:00 AM Puja Townsend APRN CNP [facility]       Dominique Roblero, RN, BSN, PHN  Care Coordinator       AVS/Discharge Summary is the source of truth; this is a helpful guide for improved communication of patient story

## 2019-09-20 NOTE — PLAN OF CARE
Admitted for recurrent CNS Lymphoma.  Afebrile, VSS on RA.  Denies pain or nausea.  Up with SBGUNNER.  Vandana at bedside.  Continue plan of care.

## 2019-09-20 NOTE — PROGRESS NOTES
", along with Doctor Dailey tried multiple numbers in chart without success to contact Francie regarding here MRI results. Writer called [facility] patient primary care clinic and talked with triage and they provided me with different contact numbers. Writer able to reach Eugenie, daughter who called her mother Francie who was at adult . She verbally consented for me to talk with daughter. Daughter wants to hear the information then give it to her mom. \"I don't want her to panic\".    Eugenie called back, some information hard to understand but she does speak good English and had good understanding of the information given. She now lives in [place], [place]. Her brother lives here in [place], [place]. She has made several trips up here lately due to her mothers condition. She reports, after writer told her that her mom has new brain lesions, (also told her the size per her request) that her mom has had several falls and been very forgetful. This is on top of the report loss of appetite and weight loss she reported to Doctor Dailey last week. The diagnosis is alarming but not surprising to daughter seeing how her mom has been. No family here with patient last week. Interpretor with patient. Saw PCP for the weight loss this week.     Eugenie will call her brother to inform him of the above and decide what to do next. Bed requested for 7D.    Son, Jeffery called writer back, needed more information and what to do next. Not quite understanding that she will stay in the hospital for work up and treatment. Writer explained this. He is working today. Will take off when plan/bed is known available.    Doctor Dailey update on the above. Phone numbers updated in chart to corredt ones.      12:34 PM-Bed ready on 7D, son is able to take off work. He will go get his mom and tell her about the reoccurrence and then bring her to the hospital for admission. Informed him to go to admissions for a direct admit to 7D-[facility], Address " given.    1:15- PM-Report called to Yanely ANNA.

## 2019-09-21 ENCOUNTER — APPOINTMENT (OUTPATIENT)
Dept: CT IMAGING | Facility: CLINIC | Age: 69
DRG: 841 | End: 2019-09-21
Attending: PHYSICIAN ASSISTANT
Payer: COMMERCIAL

## 2019-09-21 LAB
ANION GAP SERPL CALCULATED.3IONS-SCNC: 9 MMOL/L (ref 3–14)
BASOPHILS # BLD AUTO: 0 10E9/L (ref 0–0.2)
BASOPHILS NFR BLD AUTO: 0.3 %
BUN SERPL-MCNC: 18 MG/DL (ref 7–30)
CALCIUM SERPL-MCNC: 8.7 MG/DL (ref 8.5–10.1)
CHLORIDE SERPL-SCNC: 111 MMOL/L (ref 94–109)
CO2 SERPL-SCNC: 23 MMOL/L (ref 20–32)
CREAT SERPL-MCNC: 0.88 MG/DL (ref 0.52–1.04)
DIFFERENTIAL METHOD BLD: NORMAL
EOSINOPHIL # BLD AUTO: 0.1 10E9/L (ref 0–0.7)
EOSINOPHIL NFR BLD AUTO: 1.1 %
ERYTHROCYTE [DISTWIDTH] IN BLOOD BY AUTOMATED COUNT: 12.4 % (ref 10–15)
GFR SERPL CREATININE-BSD FRML MDRD: 67 ML/MIN/{1.73_M2}
GLUCOSE SERPL-MCNC: 95 MG/DL (ref 70–99)
HCT VFR BLD AUTO: 37.9 % (ref 35–47)
HGB BLD-MCNC: 12 G/DL (ref 11.7–15.7)
IMM GRANULOCYTES # BLD: 0 10E9/L (ref 0–0.4)
IMM GRANULOCYTES NFR BLD: 0.2 %
LYMPHOCYTES # BLD AUTO: 2.6 10E9/L (ref 0.8–5.3)
LYMPHOCYTES NFR BLD AUTO: 39.3 %
MCH RBC QN AUTO: 27.3 PG (ref 26.5–33)
MCHC RBC AUTO-ENTMCNC: 31.7 G/DL (ref 31.5–36.5)
MCV RBC AUTO: 86 FL (ref 78–100)
MONOCYTES # BLD AUTO: 0.4 10E9/L (ref 0–1.3)
MONOCYTES NFR BLD AUTO: 5.6 %
NEUTROPHILS # BLD AUTO: 3.5 10E9/L (ref 1.6–8.3)
NEUTROPHILS NFR BLD AUTO: 53.5 %
NRBC # BLD AUTO: 0 10*3/UL
NRBC BLD AUTO-RTO: 0 /100
PLATELET # BLD AUTO: 217 10E9/L (ref 150–450)
POTASSIUM SERPL-SCNC: 3.9 MMOL/L (ref 3.4–5.3)
RBC # BLD AUTO: 4.4 10E12/L (ref 3.8–5.2)
SODIUM SERPL-SCNC: 143 MMOL/L (ref 133–144)
WBC # BLD AUTO: 6.6 10E9/L (ref 4–11)

## 2019-09-21 PROCEDURE — 25800030 ZZH RX IP 258 OP 636: Performed by: PHYSICIAN ASSISTANT

## 2019-09-21 PROCEDURE — 80048 BASIC METABOLIC PNL TOTAL CA: CPT | Performed by: PHYSICIAN ASSISTANT

## 2019-09-21 PROCEDURE — 86704 HEP B CORE ANTIBODY TOTAL: CPT | Performed by: STUDENT IN AN ORGANIZED HEALTH CARE EDUCATION/TRAINING PROGRAM

## 2019-09-21 PROCEDURE — 25000132 ZZH RX MED GY IP 250 OP 250 PS 637: Performed by: PHYSICIAN ASSISTANT

## 2019-09-21 PROCEDURE — 74177 CT ABD & PELVIS W/CONTRAST: CPT

## 2019-09-21 PROCEDURE — 99233 SBSQ HOSP IP/OBS HIGH 50: CPT | Mod: GC | Performed by: INTERNAL MEDICINE

## 2019-09-21 PROCEDURE — 12000001 ZZH R&B MED SURG/OB UMMC

## 2019-09-21 PROCEDURE — 85025 COMPLETE CBC W/AUTO DIFF WBC: CPT | Performed by: PHYSICIAN ASSISTANT

## 2019-09-21 PROCEDURE — 36415 COLL VENOUS BLD VENIPUNCTURE: CPT | Performed by: PHYSICIAN ASSISTANT

## 2019-09-21 PROCEDURE — 71260 CT THORAX DX C+: CPT

## 2019-09-21 PROCEDURE — 25000128 H RX IP 250 OP 636: Performed by: STUDENT IN AN ORGANIZED HEALTH CARE EDUCATION/TRAINING PROGRAM

## 2019-09-21 RX ORDER — IOPAMIDOL 755 MG/ML
78 INJECTION, SOLUTION INTRAVASCULAR ONCE
Status: COMPLETED | OUTPATIENT
Start: 2019-09-21 | End: 2019-09-21

## 2019-09-21 RX ADMIN — ACETAMINOPHEN 650 MG: 325 TABLET, FILM COATED ORAL at 17:44

## 2019-09-21 RX ADMIN — SODIUM CHLORIDE, PRESERVATIVE FREE: 5 INJECTION INTRAVENOUS at 04:15

## 2019-09-21 RX ADMIN — OMEPRAZOLE 20 MG: 20 CAPSULE, DELAYED RELEASE ORAL at 09:21

## 2019-09-21 RX ADMIN — CETIRIZINE HYDROCHLORIDE 10 MG: 5 TABLET ORAL at 09:20

## 2019-09-21 RX ADMIN — ALLOPURINOL 300 MG: 300 TABLET ORAL at 09:21

## 2019-09-21 RX ADMIN — MULTIPLE VITAMINS W/ MINERALS TAB 1 TABLET: TAB at 09:21

## 2019-09-21 RX ADMIN — POLYETHYLENE GLYCOL 3350 17 G: 17 POWDER, FOR SOLUTION ORAL at 09:21

## 2019-09-21 RX ADMIN — SENNOSIDES AND DOCUSATE SODIUM 1 TABLET: 8.6; 5 TABLET ORAL at 14:26

## 2019-09-21 RX ADMIN — RANITIDINE 150 MG: 150 TABLET ORAL at 22:01

## 2019-09-21 RX ADMIN — IOPAMIDOL 78 ML: 755 INJECTION, SOLUTION INTRAVENOUS at 13:29

## 2019-09-21 ASSESSMENT — ACTIVITIES OF DAILY LIVING (ADL)
ADLS_ACUITY_SCORE: 22

## 2019-09-21 ASSESSMENT — MIFFLIN-ST. JEOR: SCORE: 1023.96

## 2019-09-21 NOTE — PLAN OF CARE
Swallow study ordered due to chocking with reg foods.please order soft foods only( you have to order food for pt)abdominal ct done LP tomorrow? To r/o cns lymphoma. No c/o pain. LE weakness.please bed alarm and help with activity.

## 2019-09-21 NOTE — PLAN OF CARE
Family in room at bedside until 0200 approximately.Patient denied pain or nausea.Voiding adequate amounts.Neuro to decide LP vs brain BX.Plan BMBX Monday .Pet scan Tuesday.Has history of falls and weight loss.On bed alarm.

## 2019-09-21 NOTE — PROGRESS NOTES
VSS on room air. Afebrile. Tylenol given for generalized mild pain. CT of CAP sched for tomorrow. Neuro consulted - potential LP or brain biopsy for new brain lesions. BMBx likely Monday. PET scan scheduled for Tuesday. Poor appetite, ate 2 bites of dinner. C/o weakness. Up with SBA. Sons attentive at bedside. BM this morning. Refused scheduled stool softeners. Interpreters scheduled for several days but sons plan to be there. Pt requests to use family if present as interpreters but use interpreters if they are gone. Son stated that family plans to be here most of the time.

## 2019-09-21 NOTE — PROGRESS NOTES
Nursing Focus: Admission  D: Arrived at 13:00 from home via transport via sons. Patient accompanied by two sons (Richelle and Vinita). Admitted for w/u of lymphoma relapse. Complains of weakness and no appetite/loss of weight.      I: Admission process began.  Patient oriented to room, enviroment, call light.  MD notified of patient's arrival on unit.     A: Vital signs stable, afebrile.  Patient stable at this time.  Complaining of weakness.     P: Implement plan of care when available. Continue to monitor patient. Nursing interventions as appropriate. Notify MD with changes in pt status.    Per Mobility Level Guideline;  Bed/chair alarm No.  Patient may ambulate stand by assist  Patient requires the following assistive equipment:  PT/OT consult orders in place No (not yet at least)

## 2019-09-21 NOTE — PROGRESS NOTES
"[facility], [place]    Hematology / Oncology Progress Note    Date of Service (when I saw the patient): 09/21/2019     Assessment & Plan   Simran Negro is a 69 year old Hmong speaking female with a history of DLBCL with CNS relapse who achieved CR in 2014. A routine brain MRI on 9/19/19 revealed two new b/l frontal lobe lesions concerning for recurrent lymphoma. She is being admitted for further workup and management.     #DLBCL with CNS relapse  #New frontal lobe masses  Follows with Dr. Dailey. History per most recent progress note, \"Diagnosed with DLBCL of the cervix in 06/2007 and was treated with 6 cycles of R-CHOP. She attained a CR, but in early 2014 developed difficulty concentrating, gait instability, and right-sided headaches with some blurry vision. Workup revealed an intra-axial right parietal mass. Stereotactic biopsy on 03/19/2014 which was diagnostic for DLBCL, non-germinal center type  Immunophenotypically this was similar to her previous diffuse large B-cell lymphoma, and this was presumed CNS relapse. She was started on MT-R (methotrexate, temozolomide and rituximab) therapy in 03/2014.  Her course was complicated by line-related DVT, and she completed a course of enoxaparin for this. She attained a complete clinical and radiographic response after completing the MT-R treatment regimen and EA consolidation in 8/2014.\" A routine brain MRI on 9/19/19 revealed two new b/l frontal lobe lesions, left sided lesion is 4.2x3.2x3.9cm in size and right sided lesion is 1.9x2.7x3.3cm, concerning for recurrent lymphoma. Reports fatigue and poor appetite as most significant symptoms over the last month. Sons endorse she has been forgetful with slurred speech and unsteadiness when walking.  - Neurosurgery consult for biopsy consideration, recs appreciated; will discuss biopsy vs LP given midline shift  - possible CAPS consult for LP pending NS consult; will obtain cytology, diff, cell " count, and flow cytometry  - Hold off on steroids unless clinically unstable  - Further work-up with BMBx scheduled for 9/23 at 1pm, PET scan on 9/24 at 2:15pm. Will get CT CAP w/ and w/o contrast on 9/21/19 (ordered).  - Check baseline TLS/DIC labs  - Hepatitis serologies sent; pending Hep B core Angélica (Hep B non-immune status)     #TLS, low risk currently  Uric acid 8.0 on admission. No other evidence of TLS.  -  mL/hr  - Allopurinol 300 mg daily      #GERD  - Continue PTA Omeprazole and Ranitidine     #Insomnia  - Continue PTA Ambien prn     #Moderate malnutrition  Reports fatigue and poor appetite over the last month. Has lost about 15 lbs.  - Encourage small, frequent meals  - Supplements between meals     FEN  -  mL/hr  - Regular diet  - Replace lytes prn     PPx  - VTE: Hold currently given potential for biopsy  - GI: PTA Omeprazole/Ranitidine     Dispo: Admission for further work-up of brain lesions     The plan was discussed with staff physician, Dr. Dailey, who agreed.    Tito Greenberg MD  Hem/Onc Moon[place]  Pager #[phone]    Interval History   No acute events overnight.  Endorses generalized weakness intermittently.  Denies vision changes, worsening headaches, N/V, chest pain, shortness of breath, abdominal pain, or diarrhea.  Poor appetite overall.  Appropriate UOP.  Questions and concerns will be addressed at bedside pending  or family at bedside to assist with communication.    Physical Exam   Temp: 97.7  F (36.5  C) Temp src: Oral BP: 124/65 Pulse: 100   Resp: 18 SpO2: 97 % O2 Device: None (Room air)    Vitals:    09/20/19 1400   Weight: 58.4 kg (128 lb 12.8 oz)     Vital Signs with Ranges  Temp:  [97  F (36.1  C)-98.5  F (36.9  C)] 97.7  F (36.5  C)  Pulse:  [] 100  Resp:  [16-18] 18  BP: (106-129)/(58-73) 124/65  SpO2:  [96 %-98 %] 97 %  I/O last 3 completed shifts:  In: 480 [I.V.:480]  Out: 200 [Urine:200]  Constitutional: Pleasant female seen laying in bed. No  apparent distress, and appears stated age.  In non-acute distress.    Eyes: Lids and lashes normal, sclera clear, conjunctiva normal.  ENT: Normocephalic, oral cavity without erythema or exudates, gums normal and good dentition.   Respiratory: No increased work of breathing, good air exchange, clear to auscultation bilaterally, no crackles or wheezing.  Cardiovascular: Regular rate and rhythm, normal S1 and S2, and no murmur noted.  GI: No masses or scars. +BS. Soft. No tenderness on palpation.  Skin: No bleeding, no redness, no warmth, no rashes, no lesions, no jaundice.  Extremities: No lower extremity edema. No cyanosis.  Neurologic: Awake, alert. Slow to respond to commands. Sensation intact upper and lower extremities.    Medications     - MEDICATION INSTRUCTIONS -       sodium chloride 100 mL/hr at 09/21/19 0415       allopurinol  300 mg Oral Daily     cetirizine  10 mg Oral Daily     multivitamin w/minerals  1 tablet Oral Daily     omeprazole  20 mg Oral QAM AC     polyethylene glycol  17 g Oral Daily     ranitidine  150 mg Oral At Bedtime     senna-docusate  1 tablet Oral BID    Or     senna-docusate  2 tablet Oral BID     sodium chloride (PF)  3 mL Intracatheter Q8H       Data   Results for orders placed or performed during the hospital encounter of 09/20/19 (from the past 24 hour(s))   Neurosurgery Adult IP Consult: Patient to be seen: Routine within 24 hrs; Call back #: [phone]; Two new frontal lesions in patient with h/o of CNS lymphoma, suspect relapse. Consult for consideration of biopsy and/or surgical intervention.; C...    Narrative    Tyrone Coyle MD     9/20/2019  6:42 PM  [facility]       NEUROSURGERY CONSULTATION NOTE    This consultation was requested by Dr. Venegas from the Heme/Onc   service.    Reason for Consultation  Intracranial mass, likely recurrence of CNS lymphoma    HPI: Simran Negro is a 69 year old female with a history  of DLBCL   including CNS lymphoma that was treated with chemotherapy and had   been in remission, who was seen for an MRI showing 2 large   intracranial masses. She was being seen yesterday (9/19/19) for a   routine follow up and a surveillance MRI was ordered.    Of note she states she has been feeling fatigued for the past 3   months. She also has been feeling a lack of appetite which caused   a lot of weight loss and weakness and numbness in both of her   legs. She denies any pain, trouble with balance, bowel or bladder   dysfunction, or headaches. She states she feels like she is doing   ok in general but is concerned about her leg symptoms and lack of   appetite.    PAST MEDICAL HISTORY:   Past Medical History:   Diagnosis Date     Anxiety      Cancer (H)     brain tumor/lymphoma     Depressive disorder      Dry eye syndrome      Gallstones      Lupus (H)     Patient denies having lupus     Seizures (H)      Sjogrens syndrome (H)        PAST SURGICAL HISTORY:   Past Surgical History:   Procedure Laterality Date     CHOLECYSTECTOMY       OPTICAL TRACKING SYSTEM BIOPSY BRAIN  3/19/2014    Procedure: OPTICAL TRACKING SYSTEM BIOPSY BRAIN;  Stealth   Assisted Right Parietal Tumor Biopsy ;  Surgeon: Aristeo Tran MD;  Location: [facility] OR     Power Port Placement         FAMILY HISTORY:   Family History   Problem Relation Age of Onset     Cancer No family hx of      Diabetes No family hx of        SOCIAL HISTORY:   Social History     Tobacco Use     Smoking status: Never Smoker     Smokeless tobacco: Never Used   Substance Use Topics     Alcohol use: No       MEDICATIONS:  Medications Prior to Admission   Medication Sig Dispense Refill Last Dose     Acetaminophen (TYLENOL PO) Take 325 mg by mouth   Taking     acetaminophen-codeine (TYLENOL #3) 300-30 MG per tablet    Not   Taking     ammonium lactate (AMLACTIN) 12 % cream Apply two to three times   a day as needed.   Not Taking     aspirin EC 81 MG EC  "tablet Take 81 mg by mouth   Not Taking     Calcium carb-Vitamin D 500 mg [place]-200 units (OYSTER SHELL   CALCIUM/D) 500-200 MG-UNIT per tablet    Taking     cetirizine (ZYRTEC) 10 MG tablet Take 10 mg by mouth   Not   Taking     CYANOCOBALAMIN PO Take 500 mcg by mouth daily   Not Taking     fluticasone (FLONASE) 50 MCG/ACT spray Spray 2 sprays in   nostril   Not Taking     magnesium gluconate (MAGONATE) 500 MG tablet Take 500 mg by   mouth   Not Taking     Multiple Vitamin (MULTI-VITAMINS) TABS    Not Taking     omeprazole (PRILOSEC) 20 MG CR capsule Take 20 mg by mouth     Not Taking     ORDER FOR DME Please dispense to take 3 Ensure/day. (Patient   not taking: Reported on 2/19/2018) 90 Units 3 Not Taking     ranitidine (ZANTAC) 150 MG tablet Take 150 mg by mouth   Not   Taking     Skin Protectants, Misc. (CRITIC-AID CLEAR) OINT Externally   apply 1 Application topically 2 times daily (Patient not taking:   Reported on 2/19/2018) 1 Tube 2 Not Taking     zolpidem (AMBIEN) 5 MG tablet Take 5 mg by mouth   Not Taking       Allergies:  Allergies   Allergen Reactions     Acetaminophen-Codeine Nausea and Vomiting     Patient states she is not allergic to this Tylenol w/codeine  Has taken with food and tolerated       ROS: 10 point ROS of systems including Constitutional, Eyes,   Respiratory, Cardiovascular, Gastroenterology, Genitourinary,   Integumentary, Muscularskeletal, Psychiatric were all negative   except for pertinent positives noted in my HPI.    Physical exam:   Blood pressure 129/66, pulse 96, temperature 97.5  F (36.4  C),   temperature source Oral, resp. rate 18, height 1.499 m (4' 11\"),   weight 58.4 kg (128 lb 12.8 oz), SpO2 96 %, not currently   breastfeeding.  CV: Rate and BP as noted above  PULM: breathing comfortably on room air  ABD: soft, non-distended  NEUROLOGIC:  -- Awake; Alert  -- Follows commands briskly  -- Speech fluent, spontaneous. No aphasia or dysarthria.  -- no gaze preference. No " apparent hemineglect.  Cranial Nerves:  -- PERRL 3-2mm bilat and brisk, extraocular movements intact  -- face symmetrical, tongue midline  -- sensory V1-V3 intact bilaterally  -- palate elevates symmetrically, uvula midline  -- hearing grossly intact bilat  -- Trapezii 5/5 strength bilat symmetric  -- Cerebellar: Finger nose finger without dysmetria    Motor:  Normal bulk / tone; no tremor, rigidity, or bradykinesia.  No   muscle wasting or fasciculations     Delt Bi Tri Hand Flexion/  Extension Iliopsoas Quadriceps Hamstrings Tibialis Anterior   Gastroc    C5 C6 C7 C8/T1 L2 L3 L4-S1 L4 S1   R 5 5 5 5 5 5 5 5 5   L 5 5 5 5 5 5 5 5 5   Sensory:  Diminished sensation in bilateral legs in a non   dermatomal distribution    Reflexes:     Bi BR Pat Bab    C5-6 C6 L2-4 UMN   R 2+ 2+ 2+ Norm   L 2+ 2+ 2+ Norm         LABS:  Last Comprehensive Metabolic Panel:  Sodium   Date Value Ref Range Status   09/20/2019 139 133 - 144 mmol/L Final     Potassium   Date Value Ref Range Status   09/20/2019 3.8 3.4 - 5.3 mmol/L Final     Chloride   Date Value Ref Range Status   09/20/2019 105 94 - 109 mmol/L Final     Carbon Dioxide   Date Value Ref Range Status   09/20/2019 24 20 - 32 mmol/L Final     Anion Gap   Date Value Ref Range Status   09/20/2019 11 3 - 14 mmol/L Final     Glucose   Date Value Ref Range Status   09/20/2019 176 (H) 70 - 99 mg/dL Final     Urea Nitrogen   Date Value Ref Range Status   09/20/2019 18 7 - 30 mg/dL Final     Creatinine   Date Value Ref Range Status   09/20/2019 1.00 0.52 - 1.04 mg/dL Final     GFR Estimate   Date Value Ref Range Status   09/20/2019 58 (L) >60 mL/min/[1.73_m2] Final     Comment:     Non  GFR Calc  Starting 12/18/2018, serum creatinine based estimated GFR (eGFR)   will be   calculated using the Chronic Kidney Disease Epidemiology   Collaboration   (CKD-EPI) equation.       Calcium   Date Value Ref Range Status   09/20/2019 8.6 8.5 - 10.1 mg/dL Final     Lab Results    Component Value Date    WBC 6.5 09/20/2019     Lab Results   Component Value Date    RBC 4.46 09/20/2019     Lab Results   Component Value Date    HGB 12.3 09/20/2019     Lab Results   Component Value Date    HCT 38.2 09/20/2019     Lab Results   Component Value Date    MCV 86 09/20/2019     Lab Results   Component Value Date    MCH 27.6 09/20/2019     Lab Results   Component Value Date    MCHC 32.2 09/20/2019     Lab Results   Component Value Date    RDW 12.4 09/20/2019     Lab Results   Component Value Date     09/20/2019       IMAGING:  MRI Brain: Contrast enhancing masses in the left frontal lobe and   right caudate region. Left mass is larger, abutting the dura with   dural enhancement. Right mass abutting the right lateral   ventricle. Vasogenic edema present in bilateral hemispheres    ASSESSMENT:  69 year old female with history of CNS lymphoma in 2014 was in   remission with a new surveillance MRI showing two new masses.    RECOMMENDATIONS:  -Recommend further CNS lymphoma workup  -We are happy to discuss the risks and benefits of open biopsy vs   LP for diagnosis with primary team    The patient was discussed with Dr. Carson, neurosurgery chief   resident, and Dr. Mclaughlin, neurosurgery staff, and they agree   with the above.    Tyrone Coyle MD  Neurosurgery Resident, PGY-1   CBC with platelets differential   Result Value Ref Range    WBC 6.5 4.0 - 11.0 10e9/L    RBC Count 4.46 3.8 - 5.2 10e12/L    Hemoglobin 12.3 11.7 - 15.7 g/dL    Hematocrit 38.2 35.0 - 47.0 %    MCV 86 78 - 100 fl    MCH 27.6 26.5 - 33.0 pg    MCHC 32.2 31.5 - 36.5 g/dL    RDW 12.4 10.0 - 15.0 %    Platelet Count 208 150 - 450 10e9/L    Diff Method Automated Method     % Neutrophils 62.1 %    % Lymphocytes 30.5 %    % Monocytes 5.6 %    % Eosinophils 1.2 %    % Basophils 0.3 %    % Immature Granulocytes 0.3 %    Nucleated RBCs 0 0 /100    Absolute Neutrophil 4.0 1.6 - 8.3 10e9/L    Absolute Lymphocytes 2.0 0.8 - 5.3  10e9/L    Absolute Monocytes 0.4 0.0 - 1.3 10e9/L    Absolute Eosinophils 0.1 0.0 - 0.7 10e9/L    Absolute Basophils 0.0 0.0 - 0.2 10e9/L    Abs Immature Granulocytes 0.0 0 - 0.4 10e9/L    Absolute Nucleated RBC 0.0    Comprehensive metabolic panel   Result Value Ref Range    Sodium 139 133 - 144 mmol/L    Potassium 3.8 3.4 - 5.3 mmol/L    Chloride 105 94 - 109 mmol/L    Carbon Dioxide 24 20 - 32 mmol/L    Anion Gap 11 3 - 14 mmol/L    Glucose 176 (H) 70 - 99 mg/dL    Urea Nitrogen 18 7 - 30 mg/dL    Creatinine 1.00 0.52 - 1.04 mg/dL    GFR Estimate 58 (L) >60 mL/min/[1.73_m2]    GFR Estimate If Black 67 >60 mL/min/[1.73_m2]    Calcium 8.6 8.5 - 10.1 mg/dL    Bilirubin Total 0.7 0.2 - 1.3 mg/dL    Albumin 3.5 3.4 - 5.0 g/dL    Protein Total 6.8 6.8 - 8.8 g/dL    Alkaline Phosphatase 76 40 - 150 U/L    ALT 42 0 - 50 U/L    AST 19 0 - 45 U/L   Magnesium   Result Value Ref Range    Magnesium 2.1 1.6 - 2.3 mg/dL   Phosphorus   Result Value Ref Range    Phosphorus 3.9 2.5 - 4.5 mg/dL   Uric acid   Result Value Ref Range    Uric Acid 8.0 (H) 2.6 - 6.0 mg/dL   Lactate Dehydrogenase   Result Value Ref Range    Lactate Dehydrogenase 161 81 - 234 U/L   Hepatitis B Surface Antibody   Result Value Ref Range    Hepatitis B Surface Antibody 2.72 <8.00 m[IU]/mL   Hepatitis B surface antigen   Result Value Ref Range    Hep B Surface Agn Nonreactive NR^Nonreactive   Hepatitis C antibody   Result Value Ref Range    Hepatitis C Antibody Nonreactive NR^Nonreactive   INR   Result Value Ref Range    INR 1.00 0.86 - 1.14   Fibrinogen activity   Result Value Ref Range    Fibrinogen 336 200 - 420 mg/dL   Partial thromboplastin time   Result Value Ref Range    PTT 29 22 - 37 sec

## 2019-09-22 ENCOUNTER — APPOINTMENT (OUTPATIENT)
Dept: SPEECH THERAPY | Facility: CLINIC | Age: 69
DRG: 841 | End: 2019-09-22
Attending: INTERNAL MEDICINE
Payer: COMMERCIAL

## 2019-09-22 LAB
ALBUMIN UR-MCNC: NEGATIVE MG/DL
ANION GAP SERPL CALCULATED.3IONS-SCNC: 8 MMOL/L (ref 3–14)
APPEARANCE UR: CLEAR
BASOPHILS # BLD AUTO: 0 10E9/L (ref 0–0.2)
BASOPHILS NFR BLD AUTO: 0.4 %
BILIRUB UR QL STRIP: NEGATIVE
BUN SERPL-MCNC: 12 MG/DL (ref 7–30)
CALCIUM SERPL-MCNC: 8.7 MG/DL (ref 8.5–10.1)
CHLORIDE SERPL-SCNC: 111 MMOL/L (ref 94–109)
CO2 SERPL-SCNC: 24 MMOL/L (ref 20–32)
COLOR UR AUTO: NORMAL
CREAT SERPL-MCNC: 0.96 MG/DL (ref 0.52–1.04)
DIFFERENTIAL METHOD BLD: ABNORMAL
EOSINOPHIL # BLD AUTO: 0.1 10E9/L (ref 0–0.7)
EOSINOPHIL NFR BLD AUTO: 1.6 %
ERYTHROCYTE [DISTWIDTH] IN BLOOD BY AUTOMATED COUNT: 12.5 % (ref 10–15)
GFR SERPL CREATININE-BSD FRML MDRD: 61 ML/MIN/{1.73_M2}
GLUCOSE SERPL-MCNC: 116 MG/DL (ref 70–99)
GLUCOSE UR STRIP-MCNC: NEGATIVE MG/DL
HCT VFR BLD AUTO: 35.8 % (ref 35–47)
HGB BLD-MCNC: 11.1 G/DL (ref 11.7–15.7)
HGB UR QL STRIP: NEGATIVE
IMM GRANULOCYTES # BLD: 0 10E9/L (ref 0–0.4)
IMM GRANULOCYTES NFR BLD: 0.4 %
KETONES UR STRIP-MCNC: NEGATIVE MG/DL
LEUKOCYTE ESTERASE UR QL STRIP: NEGATIVE
LYMPHOCYTES # BLD AUTO: 1.9 10E9/L (ref 0.8–5.3)
LYMPHOCYTES NFR BLD AUTO: 34.5 %
MCH RBC QN AUTO: 26.9 PG (ref 26.5–33)
MCHC RBC AUTO-ENTMCNC: 31 G/DL (ref 31.5–36.5)
MCV RBC AUTO: 87 FL (ref 78–100)
MONOCYTES # BLD AUTO: 0.4 10E9/L (ref 0–1.3)
MONOCYTES NFR BLD AUTO: 6.6 %
NEUTROPHILS # BLD AUTO: 3.2 10E9/L (ref 1.6–8.3)
NEUTROPHILS NFR BLD AUTO: 56.5 %
NITRATE UR QL: NEGATIVE
NRBC # BLD AUTO: 0 10*3/UL
NRBC BLD AUTO-RTO: 0 /100
PH UR STRIP: 6 PH (ref 5–7)
PLATELET # BLD AUTO: 172 10E9/L (ref 150–450)
POTASSIUM SERPL-SCNC: 4.2 MMOL/L (ref 3.4–5.3)
RBC # BLD AUTO: 4.13 10E12/L (ref 3.8–5.2)
SODIUM SERPL-SCNC: 142 MMOL/L (ref 133–144)
SOURCE: NORMAL
SP GR UR STRIP: 1.01 (ref 1–1.03)
UROBILINOGEN UR STRIP-MCNC: NORMAL MG/DL (ref 0–2)
WBC # BLD AUTO: 5.6 10E9/L (ref 4–11)

## 2019-09-22 PROCEDURE — 85025 COMPLETE CBC W/AUTO DIFF WBC: CPT | Performed by: PHYSICIAN ASSISTANT

## 2019-09-22 PROCEDURE — 80048 BASIC METABOLIC PNL TOTAL CA: CPT | Performed by: PHYSICIAN ASSISTANT

## 2019-09-22 PROCEDURE — 25800030 ZZH RX IP 258 OP 636: Performed by: PHYSICIAN ASSISTANT

## 2019-09-22 PROCEDURE — 25000132 ZZH RX MED GY IP 250 OP 250 PS 637: Performed by: PHYSICIAN ASSISTANT

## 2019-09-22 PROCEDURE — 81003 URINALYSIS AUTO W/O SCOPE: CPT | Performed by: PHYSICIAN ASSISTANT

## 2019-09-22 PROCEDURE — 12000001 ZZH R&B MED SURG/OB UMMC

## 2019-09-22 PROCEDURE — 36415 COLL VENOUS BLD VENIPUNCTURE: CPT | Performed by: PHYSICIAN ASSISTANT

## 2019-09-22 PROCEDURE — 92610 EVALUATE SWALLOWING FUNCTION: CPT | Mod: GN

## 2019-09-22 PROCEDURE — 92526 ORAL FUNCTION THERAPY: CPT | Mod: GN

## 2019-09-22 PROCEDURE — 99233 SBSQ HOSP IP/OBS HIGH 50: CPT | Mod: GC | Performed by: INTERNAL MEDICINE

## 2019-09-22 RX ADMIN — ACETAMINOPHEN 650 MG: 325 TABLET, FILM COATED ORAL at 20:03

## 2019-09-22 RX ADMIN — POLYETHYLENE GLYCOL 3350 17 G: 17 POWDER, FOR SOLUTION ORAL at 08:41

## 2019-09-22 RX ADMIN — SODIUM CHLORIDE, PRESERVATIVE FREE: 5 INJECTION INTRAVENOUS at 22:56

## 2019-09-22 RX ADMIN — SENNOSIDES AND DOCUSATE SODIUM 2 TABLET: 8.6; 5 TABLET ORAL at 08:42

## 2019-09-22 RX ADMIN — SODIUM CHLORIDE, PRESERVATIVE FREE: 5 INJECTION INTRAVENOUS at 01:03

## 2019-09-22 RX ADMIN — CETIRIZINE HYDROCHLORIDE 10 MG: 5 TABLET ORAL at 08:41

## 2019-09-22 RX ADMIN — RANITIDINE 150 MG: 150 TABLET ORAL at 23:14

## 2019-09-22 RX ADMIN — ALLOPURINOL 300 MG: 300 TABLET ORAL at 08:42

## 2019-09-22 RX ADMIN — OMEPRAZOLE 20 MG: 20 CAPSULE, DELAYED RELEASE ORAL at 08:42

## 2019-09-22 RX ADMIN — MULTIPLE VITAMINS W/ MINERALS TAB 1 TABLET: TAB at 08:42

## 2019-09-22 ASSESSMENT — ACTIVITIES OF DAILY LIVING (ADL)
ADLS_ACUITY_SCORE: 22

## 2019-09-22 ASSESSMENT — MIFFLIN-ST. JEOR: SCORE: 1025.32

## 2019-09-22 NOTE — PROGRESS NOTES
"[facility], [place]    Hematology / Oncology Progress Note    Date of Service (when I saw the patient): 09/22/2019     Assessment & Plan   Simran Negro is a 69 year old Hmong speaking female with a history of DLBCL with CNS relapse who achieved CR in 2014. A routine brain MRI on 9/19/19 revealed two new b/l frontal lobe lesions concerning for recurrent lymphoma. She is being admitted for further workup and management.     #DLBCL with CNS relapse  #New frontal lobe masses  Follows with Dr. Dailey. History per most recent progress note, \"Diagnosed with DLBCL of the cervix in 06/2007 and was treated with 6 cycles of R-CHOP. She attained a CR, but in early 2014 developed difficulty concentrating, gait instability, and right-sided headaches with some blurry vision. Workup revealed an intra-axial right parietal mass. Stereotactic biopsy on 03/19/2014 which was diagnostic for DLBCL, non-germinal center type  Immunophenotypically this was similar to her previous diffuse large B-cell lymphoma, and this was presumed CNS relapse. She was started on MT-R (methotrexate, temozolomide and rituximab) therapy in 03/2014.  Her course was complicated by line-related DVT, and she completed a course of enoxaparin for this. She attained a complete clinical and radiographic response after completing the MT-R treatment regimen and EA consolidation in 8/2014.\" A routine brain MRI on 9/19/19 revealed two new b/l frontal lobe lesions, left sided lesion is 4.2x3.2x3.9cm in size and right sided lesion is 1.9x2.7x3.3cm, concerning for recurrent lymphoma. Reports fatigue and poor appetite as most significant symptoms over the last month. Sons endorse she has been forgetful with slurred speech and unsteadiness when walking.  - Neurosurgery consult for biopsy consideration, recs appreciated; will discuss biopsy vs LP given midline shift  - Will tentatively plan for LP on 9/23, however family and patient are reluctant to " do any procedures and would prefer to just start treatment. Getting a diagnosis before treatment is important, so we will discuss more with patient and family. If LP is done, will need to obtain cytology, diff, cell count, and flow cytometry  - Hold off on steroids unless clinically unstable (e.g. focal neurologic symptoms, seizures)  - Further work-up with BMBx scheduled for 9/23 at 1pm, PET scan on 9/24 at 2:15pm. 9/21 CT c/a/p showed no evidence of lymphoma   - Check baseline TLS/DIC labs  - Hepatitis serologies sent; pending Hep B core Angélica (Hep B non-immune status)  - Will get 24 hour urine creatinine (ordered 9/22) in preparation for possible methotrexate    #Dysphagia  - SLP consult     #TLS, low risk currently  Uric acid 8.0 on admission. No other evidence of TLS.  -  mL/hr  - Allopurinol 300 mg daily      #GERD  - Continue PTA Omeprazole and Ranitidine     #Insomnia  - Continue PTA Ambien prn     #Moderate malnutrition  Reports fatigue and poor appetite over the last month. Has lost about 15 lbs.  - Encourage small, frequent meals  - Supplements between meals     FEN  -  mL/hr  - Regular diet  - Replace lytes prn     PPx  - VTE: Hold currently given potential for biopsy  - GI: PTA Omeprazole/Ranitidine     Dispo: Admission for further work-up of brain lesions     The plan was discussed with staff physician, Dr. Dailey, who agreed.    Gustavo Babcock MD  Hem/Onc Moonlighter    Interval History   No acute events overnight. Reports of malodorous urine, so UA ordered but not yet collected. No fevers. SLP consulted for dysphagia - she notes coughing when eating. No headaches. No dyspnea. No new/worsening vision changes.     Physical Exam   Temp: 97.7  F (36.5  C) Temp src: Oral BP: 116/60 Pulse: 60   Resp: 14 SpO2: 99 % O2 Device: None (Room air)    Vitals:    09/20/19 1400 09/21/19 0801   Weight: 58.4 kg (128 lb 12.8 oz) 58.3 kg (128 lb 9.6 oz)     Vital Signs with Ranges  Temp:  [96.2  F  (35.7  C)-98.7  F (37.1  C)] 97.7  F (36.5  C)  Pulse:  [60-84] 60  Resp:  [14-20] 14  BP: (102-137)/(52-67) 116/60  SpO2:  [97 %-99 %] 99 %  I/O last 3 completed shifts:  In: 1600 [I.V.:1600]  Out: 1575 [Urine:1575]    Constitutional: healthy, alert, no distress and cooperative  Head: Normocephalic. No masses, lesions, tenderness or abnormalities  Eyes: PERRLA, EOMI  Cardiovascular: regular rate and rhythm, no tachycardia  Respiratory: Lungs clear, no increased work of breathing  Gastrointestinal: Abdomen soft, non-tender, non-distended  Musculoskeletal: gait normal and normal muscle tone  Neurologic: Normal speech, alert, follows conversation   Skin: warm, well-perfused  Extremities: no edema  Psychiatric: calm, normal affect      Medications     - MEDICATION INSTRUCTIONS -       sodium chloride 100 mL/hr at 09/22/19 0103       allopurinol  300 mg Oral Daily     cetirizine  10 mg Oral Daily     multivitamin w/minerals  1 tablet Oral Daily     omeprazole  20 mg Oral QAM AC     polyethylene glycol  17 g Oral Daily     ranitidine  150 mg Oral At Bedtime     senna-docusate  1 tablet Oral BID    Or     senna-docusate  2 tablet Oral BID     sodium chloride (PF)  3 mL Intracatheter Q8H       Data   Results for orders placed or performed during the hospital encounter of 09/20/19 (from the past 24 hour(s))   CBC with platelets differential   Result Value Ref Range    WBC 6.6 4.0 - 11.0 10e9/L    RBC Count 4.40 3.8 - 5.2 10e12/L    Hemoglobin 12.0 11.7 - 15.7 g/dL    Hematocrit 37.9 35.0 - 47.0 %    MCV 86 78 - 100 fl    MCH 27.3 26.5 - 33.0 pg    MCHC 31.7 31.5 - 36.5 g/dL    RDW 12.4 10.0 - 15.0 %    Platelet Count 217 150 - 450 10e9/L    Diff Method Automated Method     % Neutrophils 53.5 %    % Lymphocytes 39.3 %    % Monocytes 5.6 %    % Eosinophils 1.1 %    % Basophils 0.3 %    % Immature Granulocytes 0.2 %    Nucleated RBCs 0 0 /100    Absolute Neutrophil 3.5 1.6 - 8.3 10e9/L    Absolute Lymphocytes 2.6 0.8 - 5.3  10e9/L    Absolute Monocytes 0.4 0.0 - 1.3 10e9/L    Absolute Eosinophils 0.1 0.0 - 0.7 10e9/L    Absolute Basophils 0.0 0.0 - 0.2 10e9/L    Abs Immature Granulocytes 0.0 0 - 0.4 10e9/L    Absolute Nucleated RBC 0.0    Basic metabolic panel   Result Value Ref Range    Sodium 143 133 - 144 mmol/L    Potassium 3.9 3.4 - 5.3 mmol/L    Chloride 111 (H) 94 - 109 mmol/L    Carbon Dioxide 23 20 - 32 mmol/L    Anion Gap 9 3 - 14 mmol/L    Glucose 95 70 - 99 mg/dL    Urea Nitrogen 18 7 - 30 mg/dL    Creatinine 0.88 0.52 - 1.04 mg/dL    GFR Estimate 67 >60 mL/min/[1.73_m2]    GFR Estimate If Black 78 >60 mL/min/[1.73_m2]    Calcium 8.7 8.5 - 10.1 mg/dL   CT Chest/Abdomen/Pelvis w Contrast    Narrative    EXAMINATION: CT CHEST/ABDOMEN/PELVIS W CONTRAST, 9/21/2019 1:33 PM    TECHNIQUE:  Helical CT images from the lung apices through the  symphysis pubis were obtained with contrast.  Coronal reformatted  images were generated at a workstation for further assessment.    CONTRAST:  78 cc Isovue 370 IV.    COMPARISON: 8/27/2014.    HISTORY: New brain lesions concerning for relapse of lymphoma (treated  in 2014 for relapsed CNS lymphoma). Further staging for disease  burden.    FINDINGS:  Chest:   The heart is enlarged. Normal great vessels. No mediastinal  lymphadenopathy by size criteria. The central airways are patent.  Atelectasis in the posterior lower lobes. Interlobular septal  thickening with an AP gradient.    Abdomen and pelvis:   Liver: Hepatic steatosis. No suspicious liver lesions. Portal veins  appear patent.  Gallbladder: Cholecystectomy.  Spleen: Normal size.  Pancreas: No suspicious pancreatic lesions. The pancreatic duct is not  dilated.  Adrenal glands: No adrenal nodules.  Kidneys: No hydronephrosis. Tiny nonobstructing stone in the inferior  pole of left kidney.  No suspicious masses. Simple cysts bilaterally.  Bladder / Pelvic organs: There is a 1.5 cm simple cyst in the left  ovary. Unremarkable uterus.  Unremarkable bladder.  Bowel: No bowel wall thickening. The appendix is unremarkable. Normal  diameter of the small bowel and colon  Lymph nodes: No retroperitoneal, mesenteric, or pelvic  lymphadenopathy.  Fluid: No free fluid within the abdomen.  Vessels: No infrarenal aortic aneurysm.     Bones and soft tissues: No suspicious osseous lesions.      Impression    IMPRESSION:     1. In this patient with history of treated lymphoma and evidence of  CNS relapse, there is no evidence of lymphoma in the chest, abdomen,  or pelvis.  2. Mild interstitial pulmonary edema.    I have personally reviewed the examination and initial interpretation  and I agree with the findings.    MINAL NAPIER MD   CBC with platelets differential   Result Value Ref Range    WBC 5.6 4.0 - 11.0 10e9/L    RBC Count 4.13 3.8 - 5.2 10e12/L    Hemoglobin 11.1 (L) 11.7 - 15.7 g/dL    Hematocrit 35.8 35.0 - 47.0 %    MCV 87 78 - 100 fl    MCH 26.9 26.5 - 33.0 pg    MCHC 31.0 (L) 31.5 - 36.5 g/dL    RDW 12.5 10.0 - 15.0 %    Platelet Count 172 150 - 450 10e9/L    Diff Method Automated Method     % Neutrophils 56.5 %    % Lymphocytes 34.5 %    % Monocytes 6.6 %    % Eosinophils 1.6 %    % Basophils 0.4 %    % Immature Granulocytes 0.4 %    Nucleated RBCs 0 0 /100    Absolute Neutrophil 3.2 1.6 - 8.3 10e9/L    Absolute Lymphocytes 1.9 0.8 - 5.3 10e9/L    Absolute Monocytes 0.4 0.0 - 1.3 10e9/L    Absolute Eosinophils 0.1 0.0 - 0.7 10e9/L    Absolute Basophils 0.0 0.0 - 0.2 10e9/L    Abs Immature Granulocytes 0.0 0 - 0.4 10e9/L    Absolute Nucleated RBC 0.0    Basic metabolic panel   Result Value Ref Range    Sodium 142 133 - 144 mmol/L    Potassium 4.2 3.4 - 5.3 mmol/L    Chloride 111 (H) 94 - 109 mmol/L    Carbon Dioxide 24 20 - 32 mmol/L    Anion Gap 8 3 - 14 mmol/L    Glucose 116 (H) 70 - 99 mg/dL    Urea Nitrogen 12 7 - 30 mg/dL    Creatinine 0.96 0.52 - 1.04 mg/dL    GFR Estimate 61 >60 mL/min/[1.73_m2]    GFR Estimate If Black 70 >60  mL/min/[1.73_m2]    Calcium 8.7 8.5 - 10.1 mg/dL

## 2019-09-22 NOTE — PLAN OF CARE
Discharge Planner SLP   Patient plan for discharge: Unknown  Current status: Clinical swallow eval completed per MD order. Pt presents with mild oropharyngeal dysphagia in setting of two new b/l frontal lobe lesions concerning for recurrent lymphoma. Oral mech exam remarkable for upper partial denture, left facial weakness (baseline?). Assessed with thin liquids, nectar-thick liquids, puree, and higher texture solids. Oral phase characterized by oral residue with solids, greater with higher texture solids. Pharyngeal phase characterized by coughing with thin liquids, both from cup and straw. No overt s/sx of aspiration with nectar-thick liquids or puree. Cough x1 with higher texture solids, unclear if this was related to solids or liquids rinse. Recommend dysphagia 3 (chopped) diet/nectar-thick liquids. Ensure pt is fully alert and upright for all PO, taking small bites/sips at slow rate. Alternate solids and liquids. SLP will follow.  Barriers to return to prior living situation: Medical condition, dysphagia/modified diet  Recommendations for discharge: Rehab  Rationale for recommendations: Pt will benefit from ongoing ST targeting swallow function; currently below baseline       Entered by: Samantha Felipe 09/22/2019 11:13 AM

## 2019-09-22 NOTE — PLAN OF CARE
Pt was very anxious and wanted to go home now.called pts son and pt was more relaxed and pt still wanted to go home. Notified to on call MD.will watch closely and support  pt.pt walks better more steady when she walk. Bed alarm on for safety.please order dinner for pt. Swallow study done changed diet to dysphagia diet.

## 2019-09-22 NOTE — PLAN OF CARE
Patient denied nausea and pain.Up frequently  to void and c/o pain and burning where moonlighter notified who ordered ua  to reflex but unable to collect as is incontinent at times or urine mixed with stool.Bladder scanned  for 375 after void and this too mentioned to moonlighter.Continue to monitor

## 2019-09-22 NOTE — PROGRESS NOTES
"Neurosurgery Progress Note    S: No acute events overnight    O:  /64   Pulse 71   Temp 97.1  F (36.2  C) (Oral)   Resp 20   Ht 1.499 m (4' 11\")   Wt 58.5 kg (128 lb 14.4 oz)   SpO2 99%   BMI 26.03 kg/m    Exam:  General: Awake;  Alert, In No Acute Distress  Pulm: Breathing Comfortably on room air  Mental status: Oriented x 3  Cranial Nerves: Cranial Nerves II-XII Intact Bilaterally  Strength:      Del Tr Bi WE WF Gr  R 5 5 5 5 5 5  L 5 5 5 5 5 5     HF KE KF DF PF   R 5 5 5 5 5   L 5 5 5 5 5     Pronator Drift: Absent  Sensory: Intact to Light Touch, except diminished sensation in bilateral legs in a non dermatomal distribution  Reflexes: No Hyperreflexia, Erwin s or Clonus Present; Toes Down-Going Bilaterally    Assessment:   69 year old female with history of CNS lymphoma in 2014 was in remission with a new surveillance MRI showing two new masses.    Plan:   -Recommend further CNS lymphoma workup  -Ongoing discussion regarding brain biopsy versus lumbar puncture with patient s primary team    Tyrone Coyle MD  Neurosurgery Resident, PGY-1  "

## 2019-09-22 NOTE — PROGRESS NOTES
S: Confused.  Family present at bedside with many appropriate questions.    O:  Exam:  General: Awake;  Alert, In No Acute Distress  Pulm: Breathing Comfortably on room air  Mental status: Oriented x 3  Cranial Nerves: Cranial Nerves II-XII Intact Bilaterally  Strength:      Del Tr Bi WE WF Gr  R 5 5 5 5 5 5  L 5 5 5 5 5 5     HF KE KF DF PF   R 5 5 5 5 5   L 5 5 5 5 5     Pronator Drift: Absent  Sensory: Intact to Light Touch, except diminished sensation in bilateral legs in a non dermatomal distribution  Reflexes: No Hyperreflexia, Erwin s or Clonus Present; Toes Down-Going Bilaterally    Assessment:   69 year old female with history of CNS lymphoma in 2014 was in remission with a new surveillance MRI showing two new masses.    Plan:     -Recommend further CNS lymphoma workup  -Discussing possibility of brain biopsy versus lumbar puncture with patient s primary team      Mg Salomon M.D.  Neurosurgery Resident, PGY-2    Please contact neurosurgery resident on call with questions.    Dial * * [phone], enter [phone] when prompted.

## 2019-09-22 NOTE — PLAN OF CARE
A/Ox4; VSS. C/o lower back pain pt associates with laying in bed all day; relieved with tylenol x1. Pt also ambulated a significant distance in hallway with NST. Pt reports having a BM this AM so declined scheduled senna. Denies n/v. Son in room with pt throughout shift. Up with SBA to commode. NS continues at 100 mL/hr. Bed alarm on at night. Continue with POC.     Per Mobility Level Guideline;  Bed/chair alarm Yes.  Patient may ambulate stand by assist  Patient requires the following assistive equipment: gait belt   PT/OT consult orders in place No

## 2019-09-22 NOTE — PROGRESS NOTES
"   09/22/19 1057   General Information   Onset Date 09/20/19   Start of Care Date 09/22/19   Referring Physician Shubham Alvarado MD   Patient Profile Review/OT: Additional Occupational Profile Info See Profile for full history and prior level of function   Patient/Family Goals Statement None stated   Swallowing Evaluation Bedside swallow evaluation   Behaviorial Observations WFL (within functional limits)   Mode of current nutrition Oral diet   Type of oral diet Regular;Thin liquid   Respiratory Status Room air   Comments SLP: Pt is a 69 year old Hmong speaking female with a history of DLBCL with CNS relapse who achieved CR in 2014. A routine brain MRI on 9/19/19 revealed two new b/l frontal lobe lesions concerning for recurrent lymphoma. She is being admitted for further workup and management. Per RN, pt choked on jerky last evening. Interview completed w/ assistance from [employer] ; pt reports she \"can't eat much\" and does cough/choke on both liquids and solids. Unable to further describe swallowing difficulty beyond not being able to eat much. Clinical swallow eval completed per MD order.   Clinical Swallow Evaluation   Oral Musculature   (generalized weakness, greater on left)   Structural Abnormalities none present   Dentition upper dentures  (upper partial)   Mucosal Quality good   Mandibular Strength and Mobility intact   Oral Labial Strength and Mobility impaired retraction;impaired pursing   Lingual Strength and Mobility WFL   Buccal Strength and Mobility intact   Laryngeal Function Cough;Throat clear;Swallow;Voicing initiated   Oral Musculature Comments Baseline (?) left sided facial weakness, upper partial denture. Good voicing   Clinical Swallow Eval: Thin Liquid Texture Trial   Mode of Presentation, Thin Liquids cup;self-fed;straw   Volume of Liquid or Food Presented 3oz thin water   Oral Phase of Swallow WFL   Pharyngeal Phase of Swallow coughing/choking   Diagnostic Statement Oral phase " "functional, pharyngeal phase characterized by coughing/choking with both cup sips and straw sips of thin liquids. Despite this, pt did not feel as if liquids were \"going down the wrong way.\"   Clinical Swallow Eval: Nectar Thick Liquid Texture Trial   Mode of Presentation, Nectar cup;straw;self-fed   Volume of Nectar Presented 3oz nectar-thick juice   Oral Phase, [place] WFL   Pharyngeal Phase, [place] intact   Diagnostic Statement WFL, no overt s/sx of aspiration   Clinical Swallow Eval: Puree Solid Texture Trial   Mode of Presentation, Puree spoon;self-fed   Volume of Puree Presented 2oz pudding   Oral Phase, Puree WFL   Oral Residue, Puree right lip drooling  (min)   Pharyngeal Phase, Puree intact   Diagnostic Statement Min residue on right side of lip, no overt s/sx of aspiration.    Clinical Swallow Eval: Solid Food Texture Trial   Mode of Presentation, Solid self-fed   Volume of Solid Food Presented 1/2 cracker   Oral Phase, Solid Residue in oral cavity   Oral Residue, Solid   (diffuse, on tongue- min)   Pharyngeal Phase, Solid coughing/choking   Diagnostic Statement Oral phase characterized by min oral residue. Pharyngeal phase characterized by cough with liquid rinse; unclear if this was related to thin liquid rinse or cracker   Esophageal Phase of Swallow   Patient reports or presents with symptoms of esophageal dysphagia No   General Therapy Interventions   Planned Therapy Interventions Dysphagia Treatment   Dysphagia treatment Oropharyngeal exercise training;Modified diet education;Instruction of safe swallow strategies;Compensatory strategies for swallowing   Swallow Eval: Clinical Impressions   Skilled Criteria for Therapy Intervention Skilled criteria met.  Treatment indicated.   Functional Assessment Scale (FAS) 5   Treatment Diagnosis Mild oropharyngeal dysphagia   Diet texture recommendations Dysphagia diet level 3;Nectar thick liquids   Recommended Feeding/Eating Techniques small sips/bites;alternate " between small bites and sips of food/liquid  (upright for all PO, slow rate)   Demonstrates Need for Referral to Another Service dietitian;occupational therapy;physical therapy   Therapy Frequency 5x/week   Predicted Duration of Therapy Intervention (days/wks) 2 week   Anticipated Discharge Disposition inpatient rehabilitation facility   Risks and Benefits of Treatment have been explained. Yes   Patient, family and/or staff in agreement with Plan of Care Yes   Clinical Impression Comments SLP: Clinical swallow eval completed per MD order. Pt presents with mild oropharyngeal dysphagia in setting of two new b/l frontal lobe lesions concerning for recurrent lymphoma. Oral mech exam remarkable for upper partial denture, left facial weakness (baseline?). Assessed with thin liquids, nectar-thick liquids, puree, and higher texture solids. Oral phase characterized by oral residue with solids, greater with higher texture solids. Pharyngeal phase characterized by coughing with thin liquids, both from cup and straw. No overt s/sx of aspiration with nectar-thick liquids or puree. Cough x1 with higher texture solids, unclear if this was related to solids or liquids rinse. Recommend dysphagia 3 (chopped) diet/nectar-thick liquids. Ensure pt is fully alert and upright for all PO, taking small bites/sips at slow rate. Alternate solids and liquids. SLP will follow.   Total Evaluation Time   Total Evaluation Time (Minutes) 12

## 2019-09-23 ENCOUNTER — APPOINTMENT (OUTPATIENT)
Dept: SPEECH THERAPY | Facility: CLINIC | Age: 69
DRG: 841 | End: 2019-09-23
Attending: INTERNAL MEDICINE
Payer: COMMERCIAL

## 2019-09-23 LAB
ANION GAP SERPL CALCULATED.3IONS-SCNC: 7 MMOL/L (ref 3–14)
APPEARANCE CSF: CLEAR
BASOPHILS # BLD AUTO: 0 10E9/L (ref 0–0.2)
BASOPHILS NFR BLD AUTO: 0.3 %
BUN SERPL-MCNC: 9 MG/DL (ref 7–30)
CALCIUM SERPL-MCNC: 8.6 MG/DL (ref 8.5–10.1)
CHLORIDE SERPL-SCNC: 112 MMOL/L (ref 94–109)
CO2 SERPL-SCNC: 24 MMOL/L (ref 20–32)
COLOR CSF: COLORLESS
CREAT SERPL-MCNC: 0.81 MG/DL (ref 0.52–1.04)
CRYPTOC AG SPEC QL: NORMAL
DIFFERENTIAL METHOD BLD: ABNORMAL
EOSINOPHIL # BLD AUTO: 0.1 10E9/L (ref 0–0.7)
EOSINOPHIL NFR BLD AUTO: 1.6 %
ERYTHROCYTE [DISTWIDTH] IN BLOOD BY AUTOMATED COUNT: 12.4 % (ref 10–15)
GFR SERPL CREATININE-BSD FRML MDRD: 74 ML/MIN/{1.73_M2}
GLUCOSE CSF-MCNC: 69 MG/DL (ref 40–70)
GLUCOSE SERPL-MCNC: 107 MG/DL (ref 70–99)
GRAM STN SPEC: NORMAL
HBV CORE AB SERPL QL IA: NONREACTIVE
HCT VFR BLD AUTO: 35.3 % (ref 35–47)
HGB BLD-MCNC: 11.3 G/DL (ref 11.7–15.7)
IMM GRANULOCYTES # BLD: 0 10E9/L (ref 0–0.4)
IMM GRANULOCYTES NFR BLD: 0.2 %
LYMPHOCYTES # BLD AUTO: 2.1 10E9/L (ref 0.8–5.3)
LYMPHOCYTES NFR BLD AUTO: 36.2 %
Lab: NORMAL
MCH RBC QN AUTO: 27.6 PG (ref 26.5–33)
MCHC RBC AUTO-ENTMCNC: 32 G/DL (ref 31.5–36.5)
MCV RBC AUTO: 86 FL (ref 78–100)
MONOCYTES # BLD AUTO: 0.5 10E9/L (ref 0–1.3)
MONOCYTES NFR BLD AUTO: 8.3 %
NEUTROPHILS # BLD AUTO: 3.1 10E9/L (ref 1.6–8.3)
NEUTROPHILS NFR BLD AUTO: 53.4 %
NRBC # BLD AUTO: 0 10*3/UL
NRBC BLD AUTO-RTO: 0 /100
PLATELET # BLD AUTO: 168 10E9/L (ref 150–450)
POTASSIUM SERPL-SCNC: 3.8 MMOL/L (ref 3.4–5.3)
PROT CSF-MCNC: 161 MG/DL (ref 15–60)
RBC # BLD AUTO: 4.1 10E12/L (ref 3.8–5.2)
RBC # CSF MANUAL: NORMAL /UL (ref 0–2)
SODIUM SERPL-SCNC: 143 MMOL/L (ref 133–144)
SPECIMEN SOURCE: NORMAL
SPECIMEN SOURCE: NORMAL
TUBE # CSF: 1 #
WBC # BLD AUTO: 5.8 10E9/L (ref 4–11)
WBC # CSF MANUAL: NORMAL /UL (ref 0–5)

## 2019-09-23 PROCEDURE — 92526 ORAL FUNCTION THERAPY: CPT | Mod: GN

## 2019-09-23 PROCEDURE — T1013 SIGN LANG/ORAL INTERPRETER: HCPCS | Mod: U3

## 2019-09-23 PROCEDURE — 12000001 ZZH R&B MED SURG/OB UMMC

## 2019-09-23 PROCEDURE — 80048 BASIC METABOLIC PNL TOTAL CA: CPT | Performed by: PHYSICIAN ASSISTANT

## 2019-09-23 PROCEDURE — 25800030 ZZH RX IP 258 OP 636: Performed by: PHYSICIAN ASSISTANT

## 2019-09-23 PROCEDURE — 36415 COLL VENOUS BLD VENIPUNCTURE: CPT | Performed by: PHYSICIAN ASSISTANT

## 2019-09-23 PROCEDURE — 99233 SBSQ HOSP IP/OBS HIGH 50: CPT | Performed by: INTERNAL MEDICINE

## 2019-09-23 PROCEDURE — 87015 SPECIMEN INFECT AGNT CONCNTJ: CPT | Performed by: PHYSICIAN ASSISTANT

## 2019-09-23 PROCEDURE — 009U3ZX DRAINAGE OF SPINAL CANAL, PERCUTANEOUS APPROACH, DIAGNOSTIC: ICD-10-PCS | Performed by: PEDIATRICS

## 2019-09-23 PROCEDURE — 82945 GLUCOSE OTHER FLUID: CPT | Performed by: PHYSICIAN ASSISTANT

## 2019-09-23 PROCEDURE — 87102 FUNGUS ISOLATION CULTURE: CPT | Performed by: PHYSICIAN ASSISTANT

## 2019-09-23 PROCEDURE — 84157 ASSAY OF PROTEIN OTHER: CPT | Performed by: PHYSICIAN ASSISTANT

## 2019-09-23 PROCEDURE — 40001004 ZZHCL STATISTIC FLOW INT 9-15 ABY TC 88188: Performed by: PHYSICIAN ASSISTANT

## 2019-09-23 PROCEDURE — 25000132 ZZH RX MED GY IP 250 OP 250 PS 637: Performed by: PHYSICIAN ASSISTANT

## 2019-09-23 PROCEDURE — 88184 FLOWCYTOMETRY/ TC 1 MARKER: CPT | Performed by: PHYSICIAN ASSISTANT

## 2019-09-23 PROCEDURE — 89051 BODY FLUID CELL COUNT: CPT | Performed by: PHYSICIAN ASSISTANT

## 2019-09-23 PROCEDURE — 87205 SMEAR GRAM STAIN: CPT | Performed by: PHYSICIAN ASSISTANT

## 2019-09-23 PROCEDURE — 62270 DX LMBR SPI PNXR: CPT | Performed by: PEDIATRICS

## 2019-09-23 PROCEDURE — 88185 FLOWCYTOMETRY/TC ADD-ON: CPT | Performed by: PHYSICIAN ASSISTANT

## 2019-09-23 PROCEDURE — 86682 HELMINTH ANTIBODY: CPT | Performed by: PHYSICIAN ASSISTANT

## 2019-09-23 PROCEDURE — 87899 AGENT NOS ASSAY W/OPTIC: CPT | Performed by: PHYSICIAN ASSISTANT

## 2019-09-23 PROCEDURE — 87798 DETECT AGENT NOS DNA AMP: CPT | Performed by: PHYSICIAN ASSISTANT

## 2019-09-23 PROCEDURE — 87070 CULTURE OTHR SPECIMN AEROBIC: CPT | Performed by: PHYSICIAN ASSISTANT

## 2019-09-23 PROCEDURE — 85025 COMPLETE CBC W/AUTO DIFF WBC: CPT | Performed by: PHYSICIAN ASSISTANT

## 2019-09-23 RX ADMIN — ACETAMINOPHEN 650 MG: 325 TABLET, FILM COATED ORAL at 09:54

## 2019-09-23 RX ADMIN — MULTIPLE VITAMINS W/ MINERALS TAB 1 TABLET: TAB at 09:57

## 2019-09-23 RX ADMIN — ACETAMINOPHEN 650 MG: 325 TABLET, FILM COATED ORAL at 01:21

## 2019-09-23 RX ADMIN — ALLOPURINOL 300 MG: 300 TABLET ORAL at 09:56

## 2019-09-23 RX ADMIN — SODIUM CHLORIDE, PRESERVATIVE FREE: 5 INJECTION INTRAVENOUS at 20:07

## 2019-09-23 RX ADMIN — OMEPRAZOLE 20 MG: 20 CAPSULE, DELAYED RELEASE ORAL at 09:55

## 2019-09-23 RX ADMIN — SENNOSIDES AND DOCUSATE SODIUM 2 TABLET: 8.6; 5 TABLET ORAL at 09:56

## 2019-09-23 RX ADMIN — CETIRIZINE HYDROCHLORIDE 10 MG: 5 TABLET ORAL at 09:56

## 2019-09-23 ASSESSMENT — ACTIVITIES OF DAILY LIVING (ADL)
ADLS_ACUITY_SCORE: 22

## 2019-09-23 ASSESSMENT — PAIN DESCRIPTION - DESCRIPTORS: DESCRIPTORS: HEADACHE;ACHING

## 2019-09-23 ASSESSMENT — MIFFLIN-ST. JEOR: SCORE: 1028.49

## 2019-09-23 NOTE — PROGRESS NOTES
"Neurosurgery Progress Note    S: No acute events overnight    O:  /45   Pulse 54   Temp 97.2  F (36.2  C) (Oral)   Resp 20   Ht 1.499 m (4' 11\")   Wt 58.5 kg (128 lb 14.4 oz)   SpO2 97%   BMI 26.03 kg/m    Exam:  General: Awake;  Alert, In No Acute Distress  Pulm: Breathing Comfortably on room air  Mental status: Oriented x 3  Cranial Nerves: Cranial Nerves II-XII Intact Bilaterally  Strength:      Del Tr Bi WE WF Gr  R 5 5 5 5 5 5  L 5 5 5 5 5 5     HF KE KF DF PF   R 5 5 5 5 5   L 5 5 5 5 5     Pronator Drift: Absent  Sensory: Intact to Light Touch, except diminished sensation in bilateral legs in a non dermatomal distribution  Reflexes: No Hyperreflexia, Erwin s or Clonus Present; Toes Down-Going Bilaterally    Assessment:   69 year old female with history of CNS lymphoma in 2014 was in remission with a new surveillance MRI showing two new masses.    Plan:   -Primary team has tentative plan for LP today pending ongoing discussions with family  -Agree with LP for CNS lymphoma workup    Tyrone Coyle MD  Neurosurgery Resident, PGY-1  "

## 2019-09-23 NOTE — CONSULTS
Department of Therapeutic Radiology--Radiation Oncology                   [place] [address]  [address] [place]  [address]  Office:  [phone]  Fax:  [phone]   Radiation Oncology Clinic  [address] [place] [id number]  Phone:  [phone]  Fax:  [phone]     RE: Simran Negro : 1950   MRN: [id number] RADHA: 2019     OUTPATIENT VISIT NOTE       PROBLEM: Relapsed CNS Lymphoma      was seen for initial consultation on hospital floor 7D on 2019 at the request of Dr. Karson Dailey.    HISTORY OF PRESENT ILLNESS: Ms. Negro is a 69 year old [facility] female who was admitted for workup of possible lymphoma relapse. She was diagnosed with DLBCL of the cervix in 2007 and was treated with 6 cycles of R-CHOP.  She achieved a complete response to systemic therapy. In , she presented with difficulty concentrating, gait instability, and right-sided headaches with some blurry vision.  MRI  on 3/17/2014 revealed a 2.8 x 2.8 x 3.2 cm enhancing lesion in the R parietal lobe along with several other smaller enhancing lesions with significiant vasogenic edema. Stereotactic biopsy of the dominant lesion on 2014 was consistent with diffuse large B-cell lymphoma, non-germinal center type.  Immunophenotypically this was similar to her previous diffuse large B-cell lymphoma, and this was presumed CNS relapse.  She was started on MT-R (methotrexate, temozolomide and rituximab) therapy in 2014.  She attained a complete clinical and radiographic response after completing the MT-R treatment regimen and EA consolidation in 2014.     Ms. Negro was then followed as an outpatient and has been having annual brain MRI without evidence of disease recurrence.  However, more recently, family noted her to be more forgetful.  Her appetite was poor.  Even when she wanted to eat, she complained that nothing goes down easily.  She saw Dr. Dailey in follow-up and underwent brain  MRI which she was due anyway.  MRI on 9/19 unfortunately showed 2 new intracranial masses measuring 4.2 x 3.2 x 3.9 cm and 1.9 x 2.7 x 3.3 cm respectively and was located in the left frontal and right frontal periventricular white matter.  The finding was concerning for recurrent lymphoma.  She was admitted on 9/20 to expedite workup and to possibly initiate treatment.      Since being admitted, she underwent a CT of chest/abdomen/pelvis on 9/21, which did not appear to show evidence of lymphoma elsewhere.  She was seen by Neurosurgery and is resistant to neurosurgical biopsy of the brain lesion; however, she is agreeable to LP.  She and her family are also contemplating bone marrow biopsy.  Apart from weight loss, poor appetite, difficulty swallowing, Ms. Negro also reports new onset numbness in her bilateral feet.  As a result, she walks very slowly, though she had not had any fall incident.  She currently denies headaches, seizure activities, focal weakness, numbness other than feet, visual disturbance, bowel or urinary incontinence or retention.      PAST MEDICAL HISTORY:   Past Medical History:   Diagnosis Date     Anxiety      Cancer (H)     brain tumor/lymphoma     Depressive disorder      Dry eye syndrome      Gallstones      Lupus (H)     Patient denies having lupus     Seizures (H)      Sjogrens syndrome (H)      Past Surgical History:   Procedure Laterality Date     CHOLECYSTECTOMY       OPTICAL TRACKING SYSTEM BIOPSY BRAIN  3/19/2014    Procedure: OPTICAL TRACKING SYSTEM BIOPSY BRAIN;  Stealth Assisted Right Parietal Tumor Biopsy ;  Surgeon: Aristeo Tran MD;  Location: [facility] OR     Power Port Placement         CHEMOTHERAPY HISTORY: R-CHOP x 6 in 2007 for DLBCL of the cervix; MT-R followed by EA consolidation in 2014 after CNS relapse.     PAST RADIATION THERAPY HISTORY: None     MEDICATIONS:   Current Facility-Administered Medications   Medication     acetaminophen (TYLENOL) tablet 650 mg      allopurinol (ZYLOPRIM) tablet 300 mg     cetirizine (zyrTEC) tablet 10 mg     fluticasone (FLONASE) 50 MCG/ACT spray 2 spray     lidocaine (LMX4) cream     lidocaine 1 % 0.1-1 mL     LORazepam (ATIVAN) tablet 0.5-1 mg    Or     LORazepam (ATIVAN) injection 0.5-1 mg     magnesium sulfate 4 g in 100 mL sterile water (premade)     Medication Instruction     multivitamin w/minerals (THERA-VIT-M) tablet 1 tablet     omeprazole (priLOSEC) CR capsule 20 mg     ondansetron (ZOFRAN) injection 8 mg    Or     ondansetron (ZOFRAN-ODT) ODT tab 8 mg    Or     ondansetron (ZOFRAN) tablet 8 mg     polyethylene glycol (MIRALAX/GLYCOLAX) Packet 17 g     potassium chloride (KLOR-CON) Packet 20-40 mEq     potassium chloride 10 mEq in 100 mL intermittent infusion with 10 mg lidocaine     potassium chloride 10 mEq in 100 mL sterile water intermittent infusion (premix)     potassium chloride 20 mEq in 50 mL intermittent infusion     potassium chloride ER (K-DUR/KLOR-CON M) CR tablet 20-40 mEq     potassium phosphate 15 mmol in D5W 250 mL intermittent infusion     potassium phosphate 20 mmol in D5W 250 mL intermittent infusion     potassium phosphate 20 mmol in D5W 500 mL intermittent infusion     potassium phosphate 25 mmol in D5W 500 mL intermittent infusion     prochlorperazine (COMPAZINE) tablet 5 mg    Or     prochlorperazine (COMPAZINE) injection 5 mg     ranitidine (ZANTAC) tablet 150 mg     senna-docusate (SENOKOT-S/PERICOLACE) 8.6-50 MG per tablet 1 tablet    Or     senna-docusate (SENOKOT-S/PERICOLACE) 8.6-50 MG per tablet 2 tablet     sodium chloride (PF) 0.9% PF flush 3 mL     sodium chloride (PF) 0.9% PF flush 3 mL     sodium chloride 0.9% infusion     zolpidem (AMBIEN) tablet 5 mg       ALLERGIES:  allergic to acetaminophen-codeine.    SOCIAL HISTORY: Lives in [place]. Very supportive family. Never-smoker.  No alcohol use.     FAMILY HISTORY: No family history of malignancy      REVIEW OF SYMPTOMS:  A full  "14-point review of systems was performed. See HPI for pertinent positives and negatives.     PHYSICAL EXAMINATION:    /45   Pulse 54   Temp 97.2  F (36.2  C) (Oral)   Resp 20   Ht 1.499 m (4' 11\")   Wt 58.8 kg (129 lb 9.6 oz)   SpO2 97%   BMI 26.18 kg/m    Gen: lying in hospital bed, in NAD.  Following commands. Understand some English. Son assisted with conversation and exam.  HEENT: EOMI, PERRL. Face symmetric, facial muscle movements intact.  Neck: supple, no adenopathy  CV: well perfused  Resp: breathing comfortably on room air.  No wheezing.  Extremities: no edema.  Neuro: Cranial nerve intact.  Sensation to light touch decreased in bilateral feet, but intact in calf and thighs.  Muscle strength intact.     IMAGING:      ASSESSMENT AND PLAN: In summary, Ms. Negro is a 70 yo female with a h/o DLBCL of the cervix in 2007, treated with R-CHOP with CR, then had CNS relapse in 2014, treated with MT-R with EA consolidation.  She now again presents with 2 lesions in the brain with imaging features highly suspicious of lymphoma relapse.     I again discussed with Ms. Negro and his son the importance of obtaining tissue diagnosis as this could have important implications in the choice of therapy.  They have agreed to an LP and are contemplating bone marrow biopsy.  If she has disease limited to CNS, family seems to be open to another course of systemic therapy similar to what she received in 2014.  I discussed that since she relapsed with previous consolidation with EA, we might want to consider consolidation with whole brain radiation therapy this time.  We also discussed that if Ms. Negro is not willing to consider systemic therapy or if diagnosis could not be firmly established (e.g., LP nondiagnostic and not performing brain biopsy), we may empirically treat her with a course of whole brain radiation therapy as a palliative measure in conjunction with steroids.  This approach is certainly not ideal, but " may help relieve symptoms and obtain some local control, though the effect may not be durable.      Ms. Negro's son expressed understanding of the above conversation. He is open to LP, systemic therapy and radiation therapy for his mother.  He is calling his brother to discuss bone marrow biopsy, as the finding may impact prognosis and treatment decision.      Thank you for allowing us to participate in this patient's care.  Please feel free to call with any questions or concerns.       Chin Simmons M.D./Ph.D.  Radiation Oncologist   Department of Therapeutic Radiology   [facility]  Phone: [phone]

## 2019-09-23 NOTE — PROGRESS NOTES
"CLINICAL NUTRITION SERVICES - ASSESSMENT NOTE     Nutrition Prescription    RECOMMENDATIONS FOR MDs/PROVIDERS TO ORDER:  Recommend trial of appetite stimulant to help prompt pt to increase po intakes.    Malnutrition Status:    Unable to determine at this time    Recommendations already ordered by Registered Dietitian (RD):  Order calorie counts to help quantify po intakes to help determine need for nutritional support.  Enter prn order for ONS    Future/Additional Recommendations:  If results of calorie count collection meeting < 75% of estimated nutritional needs (<900 calories and 45 g PRO), recommend initiating TF to ensure optimal nutritional intakes.     REASON FOR ASSESSMENT  Simran Negro is a/an 69 year old female assessed by the dietitian for Admission Nutrition Risk Screen for unintentional loss of 10# or more in the past two months and reduced oral intake over the last month    NUTRITION HISTORY  Unable to obtain secondary to nursing cares at the bedside. Per H&P, pt with report of fatigue and poor appetite over the last month with a wt loss of 15 lbs.     CURRENT NUTRITION ORDERS  Diet: Dysphagia Level 3: Advanced with Nectar Thickened Liquids per SLP recommendations on 9/22 d/t pt with mild oropharyngeal dysphagia in setting of two new b/l frontal lobe lesions concerning for recurrent lymphoma.  - Per SLP reassessment today, recommendation is to continue DDL3 with upgrade to Thin Liquids today.  Intake/Tolerance: Poor per RN/flowsheets    LABS  BUN 9 (low end of normal range), 18 (9/20 - admit);     MEDICATIONS  Medications reviewed    ANTHROPOMETRICS  Height: 149.9 cm (4' 11\")  Most Recent Weight: 58.8 kg (129 lb 9.6 oz) - today's, Admit wt = 58.4 kg (128 lbs) on 9/20 (lowest wt this admission)  IBW: 44.5 kg  BMI: Overweight BMI 25-29.9  Weight History: Per H&P, wt loss of 15 lbs reported over past month. Per review of EMR, wt  fairly stable over past 2 weeks. However, since March,noted that pt has lost 7 " lbs (>5% loss) x past 6 mos.   Wt Readings from Last 10 Encounters:   09/23/19 58.8 kg (129 lb 9.6 oz)   09/09/19 58.7 kg (129 lb 6.6 oz)   03/04/19 61.6 kg (135 lb 12.8 oz)   08/31/18 62.5 kg (137 lb 11.2 oz)   02/19/18 61.2 kg (135 lb)   08/21/17 62.4 kg (137 lb 9.1 oz)   02/20/17 63.9 kg (140 lb 12.8 oz)   08/22/16 63.5 kg (140 lb)   05/23/16 66.1 kg (145 lb 11.6 oz)   02/15/16 64.7 kg (142 lb 10.2 oz)     Dosing Weight: 48 kg (adjusted based on lowest wt of 58.4 kg on admit and IBW)    ASSESSED NUTRITION NEEDS  Estimated Energy Needs: 0424-7861 kcals/day (25 - 30 kcals/kg)  Justification: Maintenance  Estimated Protein Needs: 58-72 grams protein/day (1.2 - 1.5 grams of pro/kg)  Justification: Repletion  Estimated Fluid Needs: (1 mL/kcal)   Justification: Maintenance    PHYSICAL FINDINGS  See malnutrition section below.  Unable to assess at this time  Per chart review, pt with upper partial denture, left facial weakness (baseline?).      MALNUTRITION  % Intake: Unable to assess  % Weight Loss: Up to 10% in 6 months (non-severe)  Subcutaneous Fat Loss: Unable to assess  Muscle Loss: Unable to assess  Fluid Accumulation/Edema: None noted per chart review  Malnutrition Diagnosis: Unable to determine due to unable to obtain diet hx and unable to complete nutritional physical assessment.    NUTRITION DIAGNOSIS  Inadequate protein-energy intake related to poor appetite with fatigue secondary to illness as evidenced by RN report of reduced oral intakes over the past month with wt loss of 15 lbs (although, unable to confirm per chart review) and declining BUN levels since admission.    INTERVENTIONS  Implementation  Nutrition Education: No education needs assessed at this time   Collaboration with RN regarding pt's po intakes.  Medical food supplement therapy     Goals  1. Ave po intakes per calorie counts x 3 days to meet at least 75% of pt's estimated nutritional needs (900 calories and 45 g PRO) vs need for EN.      Monitoring/Evaluation  Progress toward goals will be monitored and evaluated per protocol.  Lisbet Perez RD,LD  Unit pager [phone]

## 2019-09-23 NOTE — PLAN OF CARE
A/Ox4; VSS. NS running at 100 mL/hr. 24 hr urine collection initiated at 1900. DD3/nectar thickened liquids diet maintained; education provided to family members as they brought food from home. Received tylenol x1 for pain. Declined scheduled senna this yesika as pt had a BM this morning. Small closed, reddened abrasion noted on L buttock; remains open to air at this time. Up in chair for much of shift. Up with SBA to commode; bed alarm on. Continue with POC.

## 2019-09-23 NOTE — PLAN OF CARE
Discharge Planner SLP   Patient plan for discharge: unknown  Current status: Recommend continue dysphagia diet level 3 and advance to thin liquids as tolerated. Pt to sit upright as able for all PO intake, take small bites/sips and alternate consistencies.  Barriers to return to prior living situation: medical status  Recommendations for discharge: inpatient rehab  Rationale for recommendations: Pt is below baseline function       Entered by: Lucie Verduzco 09/23/2019 11:26 AM

## 2019-09-23 NOTE — PROGRESS NOTES
"[facility], [place]    Progress Note  Hematology / Oncology     Date of Admission:  9/20/2019  Hospital Day #: 3   Date of Service (when I saw the patient): 09/23/2019    Assessment & Plan   Simran Negro is a 69 year old ong speaking female with a history of DLBCL with CNS relapse who achieved CR in 2014. A routine brain MRI on 9/19/19 revealed two new b/l frontal lobe lesions concerning for recurrent lymphoma. She is being admitted for further workup and management.     #h/o DLBCL with CNS relapse s/p MT-R with CR 2014  #New frontal lobe masses  Follows with Dr. Dailey. History per most recent progress note, \"Diagnosed with DLBCL of the cervix in 06/2007 and was treated with 6 cycles of R-CHOP. She attained a CR, but in early 2014 developed difficulty concentrating, gait instability, and right-sided headaches with some blurry vision. Workup revealed an intra-axial right parietal mass. Stereotactic biopsy on 03/19/2014 which was diagnostic for DLBCL, non-germinal center type  Immunophenotypically this was similar to her previous diffuse large B-cell lymphoma, and this was presumed CNS relapse. She was started on MT-R (methotrexate, temozolomide and rituximab) therapy in 03/2014. Her course was complicated by line-related DVT, and she completed a course of enoxaparin for this. She attained a complete clinical and radiographic response after completing the MT-R treatment regimen and EA consolidation in 8/2014.\" A routine brain MRI on 9/19/19 revealed two new b/l frontal lobe lesions, left sided lesion is 4.2x3.2x3.9cm in size and right sided lesion is 1.9x2.7x3.3cm, concerning for recurrent lymphoma. Reports fatigue and poor appetite as most significant symptoms over the last month. Sons endorse she has been forgetful with slurred speech and unsteadiness when walking.  - Neurosurgery consult for biopsy consideration, recs appreciated. Prefer to pursue biopsy as initial step in diagnosis. "   - Consulted Med Procedure team for consideration of LP today, tentatively planning on 3 PM 9/23. Ordered CSF flow, as well as Cryptococcus, Cysticercosis, and Toxoplasma to r/o those infections.  - Hold off on steroids unless clinically unstable (e.g. focal neurologic symptoms, seizures)  - Further work-up PET scan on 9/24 at 2:15pm. Consider BMBx if DLBCL determined to be present. 9/21 CT c/a/p showed no evidence of lymphoma   - Hepatitis serologies sent; pending Hep B core Angélica (Hep B non-immune status)  - Will get 24 hour urine creatinine (ordered 9/22) in preparation for possible methotrexate. Have had to restart urine collection multiple times due to incontinence/contamination. Will place Caicedo for 24 hours to allow for accurate lab collection.      #Dysphagia  - SLP consult, appreciate assistance  - Diet advanced to Dysphagia level 3 with thin liquids     #TLS, low risk currently  Uric acid 8.0 on admission.   No other evidence of TLS. No e/o DIC on admission.  -  mL/hr  - Continue allopurinol 300 mg daily.   - Will recheck level 9/24.      #GERD  - Continue PTA Omeprazole and Ranitidine     #Insomnia  - Continue PTA Ambien prn     #Moderate malnutrition  Present on admission. Reports fatigue and poor appetite over the last month. Has lost about 15 lbs.  - Encourage small, frequent meals  - Supplements between meals     FEN  -  mL/hr  - Regular diet  - Replace lytes prn     PPx  - VTE: Hold currently given LP  - GI: PTA Omeprazole/Ranitidine     Dispo: Admission for further work-up of brain lesions     The plan was discussed with staff physician, Dr. Dailey.    JANEEN ElizaldeC  Hematology/Oncology  Pager: [phone]    Interval History   Patient seen with a professional . Simran says she is doing ok this morning. Doesn't have much of an appetite. She endorses b/l LE numbness, and b/l hand numbness. She has headaches occasionally and took some Tylenol this morning. She denies  vision changes. No nausea currently, no abd pain. Regular bowel movements.     Physical Exam   Vital Signs with Ranges  Temp:  [97.2  F (36.2  C)-98.7  F (37.1  C)] 98.7  F (37.1  C)  Pulse:  [54-78] 77  Resp:  [18-20] 20  BP: (109-132)/(45-64) 109/52  SpO2:  [97 %-99 %] 97 %    I/O last 3 completed shifts:  In: 1600 [I.V.:1600]  Out: 1035 [Urine:1035]    Vitals:    09/21/19 0801 09/22/19 0819 09/23/19 1000   Weight: 58.3 kg (128 lb 9.6 oz) 58.5 kg (128 lb 14.4 oz) 58.8 kg (129 lb 9.6 oz)     Constitutional: Pleasant adult female seen lying in bed. Awake, alert, NAD.  HEENT: NC/AT, EOMI, sclera clear, conjunctiva normal, OP with MMM, no lesions or erythema visualized  Respiratory: No increased work of breathing but poor effort during exam, CTAB, no crackles or wheezing.  Cardiovascular: RRR, no murmur noted. No peripheral edema.  GI: Normal bowel sounds, soft, non-distended and non-tender.  Skin: Warm, dry, well-perfused. No bruising, bleeding, rashes, or lesions on limited exam.  Neurologic: A&O. Answers questions appropriately. Moves all extremities spontaneously. 5/5 dorsiflexion/plantar flexion.   Neuropsychiatric: Calm, appropriate affect    Medications     - MEDICATION INSTRUCTIONS -       sodium chloride 100 mL/hr at 09/22/19 5846         allopurinol  300 mg Oral Daily     cetirizine  10 mg Oral Daily     multivitamin w/minerals  1 tablet Oral Daily     omeprazole  20 mg Oral QAM AC     polyethylene glycol  17 g Oral Daily     ranitidine  150 mg Oral At Bedtime     senna-docusate  1 tablet Oral BID    Or     senna-docusate  2 tablet Oral BID     sodium chloride (PF)  3 mL Intracatheter Q8H       Antiinfectives  Anti-infectives (From now, onward)    None          Data   CBC   Recent Labs   Lab 09/23/19  0609 09/22/19  0618 09/21/19  1042 09/20/19  1432   WBC 5.8 5.6 6.6 6.5   RBC 4.10 4.13 4.40 4.46   HGB 11.3* 11.1* 12.0 12.3   HCT 35.3 35.8 37.9 38.2   MCV 86 87 86 86   MCH 27.6 26.9 27.3 27.6   MCHC 32.0  31.0* 31.7 32.2   RDW 12.4 12.5 12.4 12.4    172 217 208       CMP   Recent Labs   Lab 09/23/19  0609 09/22/19  0618 09/21/19  1042 09/20/19  1432 09/19/19  1445    142 143 139 138   POTASSIUM 3.8 4.2 3.9 3.8 3.8   CHLORIDE 112* 111* 111* 105 105   CO2 24 24 23 24 27   ANIONGAP 7 8 9 11 6   * 116* 95 176* 207*   BUN 9 12 18 18 19   CR 0.81 0.96 0.88 1.00 0.91   GFRESTIMATED 74 61 67 58* 65   GFRESTBLACK 86 70 78 67 75   EMMA 8.6 8.7 8.7 8.6 8.7   MAG  --   --   --  2.1  --    PHOS  --   --   --  3.9  --    PROTTOTAL  --   --   --  6.8 6.9   ALBUMIN  --   --   --  3.5 3.6   BILITOTAL  --   --   --  0.7 0.6   ALKPHOS  --   --   --  76 83   AST  --   --   --  19 19   ALT  --   --   --  42 47       LFTs   Recent Labs   Lab 09/20/19  1432   PROTTOTAL 6.8   ALBUMIN 3.5   BILITOTAL 0.7   ALKPHOS 76   AST 19   ALT 42       Coagulation Studies   Recent Labs   Lab 09/20/19  1432   INR 1.00   PTT 29

## 2019-09-23 NOTE — PROGRESS NOTES
Patient alert and oriented.On bed alarm d/t urgency frequency and needing sba at times.Team ordered 24 hr urine collection but pt was incontinent of large to moderate amt urine so changed time to 2am to 2am.Team might decide a random urine or a 12 hr.Patient aware of test.Likely BMBX today and possibly LP.Family not at bedside yet this am

## 2019-09-23 NOTE — PROCEDURES
Consult and Procedure Service - Procedure Note    Attending: Tyrese Xiong MD   Procedure: Lumbar Puncture  Indication: Evaluation for CNS involvement of new CNS masses, possible lymphoma recurrence  Risk Assessment: High risk given masses, but no concern for hydrocephalus per MRI, plt and INR ok  The risks and benefits of the procedure were explained to the patient who expressed understanding and opted to proceed.  Consent was obtained and placed in the chart.  A time out was performed.    The patient was placed in the right lateral decubitus and the L4-5 innerspace palpated and marked.  4 ml of 1% lidocaine was instilled.  A 3.5 inch 22 gauge needle was inserted and clear (initially pink tinged) fluid obtained on the 2nd attempt.  Opening pressure was not performed. The stylet was replaced and the needle removed.   A total of 10 ml was removed.   Patient tolerated the procedure well. Please do not hesitate to contact our service if any complications or concerns arise.   Tyrese Xiong MD   DOS:  09/23/19

## 2019-09-24 ENCOUNTER — OFFICE VISIT (OUTPATIENT)
Dept: INTERPRETER SERVICES | Facility: CLINIC | Age: 69
End: 2019-09-24
Payer: COMMERCIAL

## 2019-09-24 ENCOUNTER — PREP FOR PROCEDURE (OUTPATIENT)
Dept: NEUROSURGERY | Facility: CLINIC | Age: 69
End: 2019-09-24

## 2019-09-24 ENCOUNTER — APPOINTMENT (OUTPATIENT)
Dept: SPEECH THERAPY | Facility: CLINIC | Age: 69
DRG: 841 | End: 2019-09-24
Attending: INTERNAL MEDICINE
Payer: COMMERCIAL

## 2019-09-24 ENCOUNTER — APPOINTMENT (OUTPATIENT)
Dept: PET IMAGING | Facility: CLINIC | Age: 69
DRG: 841 | End: 2019-09-24
Attending: PHYSICIAN ASSISTANT
Payer: COMMERCIAL

## 2019-09-24 DIAGNOSIS — G93.89 BRAIN MASS: Primary | ICD-10-CM

## 2019-09-24 DIAGNOSIS — D49.6 BRAIN TUMOR (H): Primary | ICD-10-CM

## 2019-09-24 PROBLEM — G93.9 BRAIN LESION: Status: ACTIVE | Noted: 2019-09-20

## 2019-09-24 LAB
ANION GAP SERPL CALCULATED.3IONS-SCNC: 8 MMOL/L (ref 3–14)
BASOPHILS # BLD AUTO: 0 10E9/L (ref 0–0.2)
BASOPHILS NFR BLD AUTO: 0.4 %
BUN SERPL-MCNC: 7 MG/DL (ref 7–30)
CALCIUM SERPL-MCNC: 8 MG/DL (ref 8.5–10.1)
CHLORIDE SERPL-SCNC: 113 MMOL/L (ref 94–109)
CO2 SERPL-SCNC: 24 MMOL/L (ref 20–32)
COLLECT DURATION TIME UR: 24 H
COPATH REPORT: NORMAL
CREAT 24H UR-MRATE: 0.83 G/(24.H) (ref 0.8–1.8)
CREAT SERPL-MCNC: 0.83 MG/DL (ref 0.52–1.04)
CREAT UR-MCNC: 35 MG/DL
DIFFERENTIAL METHOD BLD: ABNORMAL
EOSINOPHIL # BLD AUTO: 0.1 10E9/L (ref 0–0.7)
EOSINOPHIL NFR BLD AUTO: 1.7 %
ERYTHROCYTE [DISTWIDTH] IN BLOOD BY AUTOMATED COUNT: 12.5 % (ref 10–15)
GFR SERPL CREATININE-BSD FRML MDRD: 73 ML/MIN/{1.73_M2}
GLUCOSE SERPL-MCNC: 100 MG/DL (ref 70–99)
HCT VFR BLD AUTO: 33 % (ref 35–47)
HGB BLD-MCNC: 10.4 G/DL (ref 11.7–15.7)
IMM GRANULOCYTES # BLD: 0 10E9/L (ref 0–0.4)
IMM GRANULOCYTES NFR BLD: 0.4 %
LYMPHOCYTES # BLD AUTO: 1.9 10E9/L (ref 0.8–5.3)
LYMPHOCYTES NFR BLD AUTO: 35.4 %
MCH RBC QN AUTO: 27.1 PG (ref 26.5–33)
MCHC RBC AUTO-ENTMCNC: 31.5 G/DL (ref 31.5–36.5)
MCV RBC AUTO: 86 FL (ref 78–100)
MONOCYTES # BLD AUTO: 0.4 10E9/L (ref 0–1.3)
MONOCYTES NFR BLD AUTO: 8 %
NEUTROPHILS # BLD AUTO: 2.9 10E9/L (ref 1.6–8.3)
NEUTROPHILS NFR BLD AUTO: 54.1 %
NRBC # BLD AUTO: 0 10*3/UL
NRBC BLD AUTO-RTO: 0 /100
PLATELET # BLD AUTO: 158 10E9/L (ref 150–450)
POTASSIUM SERPL-SCNC: 3.5 MMOL/L (ref 3.4–5.3)
RBC # BLD AUTO: 3.84 10E12/L (ref 3.8–5.2)
SODIUM SERPL-SCNC: 145 MMOL/L (ref 133–144)
SPECIMEN VOL UR: 2357 ML
URATE SERPL-MCNC: 3.4 MG/DL (ref 2.6–6)
WBC # BLD AUTO: 5.3 10E9/L (ref 4–11)

## 2019-09-24 PROCEDURE — 92526 ORAL FUNCTION THERAPY: CPT | Mod: GN

## 2019-09-24 PROCEDURE — A9552 F18 FDG: HCPCS | Performed by: INTERNAL MEDICINE

## 2019-09-24 PROCEDURE — 71260 CT THORAX DX C+: CPT

## 2019-09-24 PROCEDURE — 74177 CT ABD & PELVIS W/CONTRAST: CPT

## 2019-09-24 PROCEDURE — 25000132 ZZH RX MED GY IP 250 OP 250 PS 637: Performed by: PHYSICIAN ASSISTANT

## 2019-09-24 PROCEDURE — 82570 ASSAY OF URINE CREATININE: CPT | Performed by: INTERNAL MEDICINE

## 2019-09-24 PROCEDURE — 85025 COMPLETE CBC W/AUTO DIFF WBC: CPT | Performed by: PHYSICIAN ASSISTANT

## 2019-09-24 PROCEDURE — 12000001 ZZH R&B MED SURG/OB UMMC

## 2019-09-24 PROCEDURE — 25000128 H RX IP 250 OP 636: Performed by: INTERNAL MEDICINE

## 2019-09-24 PROCEDURE — T1013 SIGN LANG/ORAL INTERPRETER: HCPCS | Mod: U3

## 2019-09-24 PROCEDURE — 84550 ASSAY OF BLOOD/URIC ACID: CPT | Performed by: PHYSICIAN ASSISTANT

## 2019-09-24 PROCEDURE — 36415 COLL VENOUS BLD VENIPUNCTURE: CPT | Performed by: PHYSICIAN ASSISTANT

## 2019-09-24 PROCEDURE — 99233 SBSQ HOSP IP/OBS HIGH 50: CPT | Performed by: INTERNAL MEDICINE

## 2019-09-24 PROCEDURE — 81050 URINALYSIS VOLUME MEASURE: CPT | Performed by: INTERNAL MEDICINE

## 2019-09-24 PROCEDURE — 80048 BASIC METABOLIC PNL TOTAL CA: CPT | Performed by: PHYSICIAN ASSISTANT

## 2019-09-24 PROCEDURE — 34300033 ZZH RX 343: Performed by: INTERNAL MEDICINE

## 2019-09-24 PROCEDURE — 25800030 ZZH RX IP 258 OP 636: Performed by: PHYSICIAN ASSISTANT

## 2019-09-24 RX ORDER — IOPAMIDOL 755 MG/ML
45-135 INJECTION, SOLUTION INTRAVASCULAR ONCE
Status: COMPLETED | OUTPATIENT
Start: 2019-09-24 | End: 2019-09-24

## 2019-09-24 RX ADMIN — RANITIDINE 150 MG: 150 TABLET ORAL at 00:44

## 2019-09-24 RX ADMIN — OMEPRAZOLE 20 MG: 20 CAPSULE, DELAYED RELEASE ORAL at 08:48

## 2019-09-24 RX ADMIN — SODIUM CHLORIDE, PRESERVATIVE FREE: 5 INJECTION INTRAVENOUS at 06:18

## 2019-09-24 RX ADMIN — MULTIPLE VITAMINS W/ MINERALS TAB 1 TABLET: TAB at 08:48

## 2019-09-24 RX ADMIN — ALLOPURINOL 300 MG: 300 TABLET ORAL at 08:48

## 2019-09-24 RX ADMIN — IOPAMIDOL 80 ML: 755 INJECTION, SOLUTION INTRAVENOUS at 15:58

## 2019-09-24 RX ADMIN — ACETAMINOPHEN 650 MG: 325 TABLET, FILM COATED ORAL at 06:16

## 2019-09-24 RX ADMIN — SODIUM CHLORIDE, PRESERVATIVE FREE: 5 INJECTION INTRAVENOUS at 18:00

## 2019-09-24 RX ADMIN — RANITIDINE 150 MG: 150 TABLET ORAL at 22:10

## 2019-09-24 RX ADMIN — CETIRIZINE HYDROCHLORIDE 10 MG: 5 TABLET ORAL at 08:48

## 2019-09-24 RX ADMIN — ACETAMINOPHEN 650 MG: 325 TABLET, FILM COATED ORAL at 00:45

## 2019-09-24 RX ADMIN — FLUDEOXYGLUCOSE F-18 10.17 MCI.: 500 INJECTION, SOLUTION INTRAVENOUS at 14:45

## 2019-09-24 ASSESSMENT — ACTIVITIES OF DAILY LIVING (ADL)
ADLS_ACUITY_SCORE: 22
ADLS_ACUITY_SCORE: 14
ADLS_ACUITY_SCORE: 22
ADLS_ACUITY_SCORE: 22
ADLS_ACUITY_SCORE: 14.5
ADLS_ACUITY_SCORE: 14.5

## 2019-09-24 NOTE — PROGRESS NOTES
"Neurosurgery Progress Note    S: No acute events overnight    O:  /59 (BP Location: Right arm)   Pulse 63   Temp 98.7  F (37.1  C) (Oral)   Resp 16   Ht 1.499 m (4' 11\")   Wt 58.8 kg (129 lb 9.6 oz)   SpO2 96%   BMI 26.18 kg/m    Exam:  General: Awake;  Alert, In No Acute Distress  Pulm: Breathing Comfortably on room air  Mental status: Oriented x 3  Cranial Nerves: Cranial Nerves II-XII Intact Bilaterally  Strength:      Del Tr Bi WE WF Gr  R 5 5 5 5 5 5  L 5 5 5 5 5 5     HF KE KF DF PF   R 5 5 5 5 5   L 5 5 5 5 5     Pronator Drift: Absent  Sensory: Intact to Light Touch, except diminished sensation in bilateral legs in a non dermatomal distribution  Reflexes: No Hyperreflexia, Erwin s or Clonus Present; Toes Down-Going Bilaterally    Assessment:   69 year old female with history of CNS lymphoma in 2014 was in remission with a new surveillance MRI showing two new masses.    Recommendations:   Continue to follow flow cytometry and cytology results  Biopsy pending results of cytometry       ARIEL Diez, CNP  Department of Neurosurgery  Pager: [phone]    "

## 2019-09-24 NOTE — PLAN OF CARE
Patient continue with low back pain-tylenol and hot packs somewhat effective.LP site CDI.Caicedo still patent as patient had sitter d/t intermittent agitation. And need to keep urine collection intact.Patient did get agitated at end of night shift and eventually calmed down with sons help.Patient instructed about no breakfast or juices after 0800 r/t   PET scan.Continue to monitor

## 2019-09-24 NOTE — CONSULTS
See separate lumbar puncture procedure note from today.    Bebeto Roa MD  Med-Peds Resident, PGY4  Pager# [phone]

## 2019-09-24 NOTE — PLAN OF CARE
"  [id number]:  Afebrile.  VSS on room air.  C/o headache, given PRN Tylenol x 1 with a decrease in pain.  24 hour urine collection required for chemo dosing.  Multiple attempts unsuccessful without dejesus.  Dejesus placed at 1445, patent with good urine output.  Had BM x 2.  Fair PO intake.  Seen by Neurology MD.  Radiation Oncology IP consult done.  Up with SBA.  Bed alarm on for safety.    [id number]:  LP done at the bedside, band-aid C/D/I.  Pt became upset and combative wanting Dejesus catheter removed, pt removed dejesus securement method (statlock), would hold on and not let go of tubing. Upon assessment , dejesus remains intact, in correct position and no s/s present of trauma and displacement.  Pt uncooperative with  services and RN staff.  Pt kept repeating that the \"dejesus needs to come out and enough samples have been collected\" and \" I will call the police if you don't listne to me\", importance and education on the dejesus explained multiple times (all communication via ).  Son called who then able to calm the patient down.  Bedside attendant order placed.  Continue to monitor and with POC.  "

## 2019-09-24 NOTE — PROGRESS NOTES
[id number]:  used throughout.  VSS, afebrile. Denies pain/nausea/SOB. LP site c/d/i. Continues w/ dejesus - 24hr urine collection; no further complaints at this time. Sitter at bedside & bed alarm on for safety. Continue to monitor & w/ POC.

## 2019-09-24 NOTE — PLAN OF CARE
Discharge Planner SLP   Patient plan for discharge: none stated   Current status: SLP: Pt restricted to H20 only this morning during scheduled  time d/t planned testing this afternoon. Initial sip of thin H20 resulted in overt s/sx of aspiration marked by coughing. No additional s/sx of aspiration with cues to take small single sips. Recommend continue dysphagia diet 3 and thin liquids from an oropharyngeal standpoint. Caregivers to encourage small single sips/bites, slow pacing, and alternate between consistencies. ST to continue to follow.   Barriers to return to prior living situation: dysphagia, medical readiness   Recommendations for discharge: pt may require ongoing ST upon discharge pending progress  Rationale for recommendations: pt would benefit from continued ST to safely return to baseline diet level        Entered by: Migdalia Hogan 09/24/2019 8:36 AM

## 2019-09-24 NOTE — PROGRESS NOTES
"[facility], [place]    Progress Note  Hematology / Oncology     Date of Admission:  9/20/2019  Hospital Day #: 4   Date of Service (when I saw the patient): 09/24/2019    Assessment & Plan   Simran Negro is a 69 year old ong speaking female with a history of DLBCL with CNS relapse who achieved CR in 2014. A routine brain MRI on 9/19/19 revealed two new b/l frontal lobe lesions concerning for recurrent lymphoma. She is being admitted for further workup and management.     #h/o DLBCL with CNS relapse s/p MT-R with CR 2014  #New frontal lobe masses  Follows with Dr. Dailey. History per most recent progress note, \"Diagnosed with DLBCL of the cervix in 06/2007 and was treated with 6 cycles of R-CHOP. She attained a CR, but in early 2014 developed difficulty concentrating, gait instability, and right-sided headaches with some blurry vision. Workup revealed an intra-axial right parietal mass. Stereotactic biopsy on 03/19/2014 which was diagnostic for DLBCL, non-germinal center type  Immunophenotypically this was similar to her previous diffuse large B-cell lymphoma, and this was presumed CNS relapse. She was started on MT-R (methotrexate, temozolomide and rituximab) therapy in 03/2014. Her course was complicated by line-related DVT, and she completed a course of enoxaparin for this. She attained a complete clinical and radiographic response after completing the MT-R treatment regimen and EA consolidation in 8/2014.\" A routine brain MRI on 9/19/19 revealed two new b/l frontal lobe lesions, left sided lesion is 4.2x3.2x3.9cm in size and right sided lesion is 1.9x2.7x3.3cm, concerning for recurrent lymphoma. Reports fatigue and poor appetite as most significant symptoms over the last month. Sons endorse she has been forgetful with slurred speech and unsteadiness when walking.  - Neurosurgery consult for biopsy consideration, recs appreciated. Prefer to pursue LP as initial step in diagnosis. " Given negative CSF flow, will tentatively put her on the schedule for Thursday, pending further discussions with patient and family.   - Consulted Med Procedure team, completed LP 9/23. CSF flow negative. Cryptococcus negative. Continue to follow Cysticercosis, and Toxoplasma.  - Hold off on steroids unless clinically unstable (e.g. focal neurologic symptoms, seizures)  - Further work-up PET scan on 9/24 at 2:15pm. Consider BMBx if DLBCL determined to be present. 9/21 CT c/a/p showed no evidence of lymphoma   - Hepatitis serologies sent; pending Hep B core Angélica (Hep B non-immune status)  - Will get 24 hour urine creatinine in preparation for possible methotrexate. Have had to restart urine collection multiple times due to incontinence/contamination. Placed Caicedo for 24 hours to allow for accurate lab collection. Will complete 24hr Cr today.      #Dysphagia  - SLP consult, appreciate assistance  - Diet advanced to Dysphagia level 3 with thin liquids     #TLS, low risk currently  Uric acid 8.0 on admission.   No other evidence of TLS. No e/o DIC on admission. Improved to 3.4 on 9/24.  -  mL/hr  - Continue allopurinol 300 mg daily.      #GERD  - Continue PTA Omeprazole and Ranitidine     #Insomnia  - Continue PTA Ambien prn     #Moderate malnutrition  Present on admission. Reports fatigue and poor appetite over the last month. Has lost about 15 lbs.  - Encourage small, frequent meals  - Supplements between meals     FEN  -  mL/hr  - Regular diet  - Replace lytes prn     PPx  - VTE: Hold currently given LP  - GI: PTA Omeprazole/Ranitidine     Dispo: Discharge pending potential biopsy or other evaluation to establish diagnosis. Anticipate additional 2-3 days inpatient.   Follow up: Needs are pending at this time    The plan was discussed with staff physician, Dr. Dailey.    Mariposa Padilla PAElizabethC  Hematology/Oncology  Pager: [phone]    Interval History   Patient seen with a professional . Simran  "says she is \"the same\" this morning. Oriented to self, place, time. Recalls clinic appointment leading up to admission, but uncertain of some of the details. Eating minimally, doesn't have much of an appetite. She endorses b/l LE numbness, and b/l hand numbness, but says it is overall improved.      Physical Exam   Vital Signs with Ranges  Temp:  [97.3  F (36.3  C)-98.7  F (37.1  C)] 98  F (36.7  C)  Pulse:  [63-70] 70  Resp:  [16-20] 16  BP: (105-131)/(51-68) 124/60  SpO2:  [95 %-99 %] 95 %    I/O last 3 completed shifts:  In: 1520 [P.O.:320; I.V.:1200]  Out: 2375 [Urine:2375]    Vitals:    09/21/19 0801 09/22/19 0819 09/23/19 1000   Weight: 58.3 kg (128 lb 9.6 oz) 58.5 kg (128 lb 14.4 oz) 58.8 kg (129 lb 9.6 oz)     Constitutional: Pleasant adult female seen lying in bed. Awake, alert, NAD.  HEENT: NC/AT, EOMI, sclera clear, conjunctiva normal  Respiratory: No increased work of breathing but poor effort during exam, clear in anterior lung fields, no crackles or wheezing.  Cardiovascular: RRR, no murmur noted. No peripheral edema.  GI: Normal bowel sounds, soft, non-distended and non-tender.  Skin: Warm, dry, well-perfused. No bruising, bleeding, rashes, or lesions on limited exam.  Neurologic: A&O. Answers questions appropriately. Moves all extremities spontaneously. 5/5 dorsiflexion/plantar flexion.   Neuropsychiatric: Calm, appropriate affect    Medications     - MEDICATION INSTRUCTIONS -       sodium chloride 100 mL/hr at 09/24/19 0618         allopurinol  300 mg Oral Daily     cetirizine  10 mg Oral Daily     multivitamin w/minerals  1 tablet Oral Daily     omeprazole  20 mg Oral QAM AC     polyethylene glycol  17 g Oral Daily     ranitidine  150 mg Oral At Bedtime     senna-docusate  1 tablet Oral BID    Or     senna-docusate  2 tablet Oral BID     sodium chloride (PF)  3 mL Intracatheter Q8H       Antiinfectives  Anti-infectives (From now, onward)    None          Data   CBC   Recent Labs   Lab " 09/24/19  0459 09/23/19  0609 09/22/19  0618 09/21/19  1042   WBC 5.3 5.8 5.6 6.6   RBC 3.84 4.10 4.13 4.40   HGB 10.4* 11.3* 11.1* 12.0   HCT 33.0* 35.3 35.8 37.9   MCV 86 86 87 86   MCH 27.1 27.6 26.9 27.3   MCHC 31.5 32.0 31.0* 31.7   RDW 12.5 12.4 12.5 12.4    168 172 217       CMP   Recent Labs   Lab 09/24/19  0459 09/23/19  0609 09/22/19  0618 09/21/19  1042 09/20/19  1432 09/19/19  1445   * 143 142 143 139 138   POTASSIUM 3.5 3.8 4.2 3.9 3.8 3.8   CHLORIDE 113* 112* 111* 111* 105 105   CO2 24 24 24 23 24 27   ANIONGAP 8 7 8 9 11 6   * 107* 116* 95 176* 207*   BUN 7 9 12 18 18 19   CR 0.83 0.81 0.96 0.88 1.00 0.91   GFRESTIMATED 73 74 61 67 58* 65   GFRESTBLACK 84 86 70 78 67 75   EMMA 8.0* 8.6 8.7 8.7 8.6 8.7   MAG  --   --   --   --  2.1  --    PHOS  --   --   --   --  3.9  --    PROTTOTAL  --   --   --   --  6.8 6.9   ALBUMIN  --   --   --   --  3.5 3.6   BILITOTAL  --   --   --   --  0.7 0.6   ALKPHOS  --   --   --   --  76 83   AST  --   --   --   --  19 19   ALT  --   --   --   --  42 47       LFTs   Recent Labs   Lab 09/20/19  1432   PROTTOTAL 6.8   ALBUMIN 3.5   BILITOTAL 0.7   ALKPHOS 76   AST 19   ALT 42       Coagulation Studies   Recent Labs   Lab 09/20/19  1432   INR 1.00   PTT 29

## 2019-09-25 ENCOUNTER — OFFICE VISIT (OUTPATIENT)
Dept: INTERPRETER SERVICES | Facility: CLINIC | Age: 69
End: 2019-09-25
Payer: COMMERCIAL

## 2019-09-25 ENCOUNTER — DOCUMENTATION ONLY (OUTPATIENT)
Dept: CARE COORDINATION | Facility: CLINIC | Age: 69
End: 2019-09-25

## 2019-09-25 LAB
ABO + RH BLD: NORMAL
ABO + RH BLD: NORMAL
ANION GAP SERPL CALCULATED.3IONS-SCNC: 6 MMOL/L (ref 3–14)
APPEARANCE CSF: CLEAR
APTT PPP: 30 SEC (ref 22–37)
BASOPHILS # BLD AUTO: 0 10E9/L (ref 0–0.2)
BASOPHILS NFR BLD AUTO: 0.6 %
BLD GP AB SCN SERPL QL: NORMAL
BLOOD BANK CMNT PATIENT-IMP: NORMAL
BUN SERPL-MCNC: 8 MG/DL (ref 7–30)
CALCIUM SERPL-MCNC: 8.3 MG/DL (ref 8.5–10.1)
CHLORIDE SERPL-SCNC: 113 MMOL/L (ref 94–109)
CO2 SERPL-SCNC: 26 MMOL/L (ref 20–32)
COLOR CSF: COLORLESS
CREAT SERPL-MCNC: 0.81 MG/DL (ref 0.52–1.04)
DIFFERENTIAL METHOD BLD: ABNORMAL
EOSINOPHIL # BLD AUTO: 0.1 10E9/L (ref 0–0.7)
EOSINOPHIL NFR BLD AUTO: 1.9 %
ERYTHROCYTE [DISTWIDTH] IN BLOOD BY AUTOMATED COUNT: 12.5 % (ref 10–15)
GFR SERPL CREATININE-BSD FRML MDRD: 74 ML/MIN/{1.73_M2}
GLUCOSE SERPL-MCNC: 94 MG/DL (ref 70–99)
HCT VFR BLD AUTO: 34.2 % (ref 35–47)
HGB BLD-MCNC: 10.7 G/DL (ref 11.7–15.7)
IMM GRANULOCYTES # BLD: 0 10E9/L (ref 0–0.4)
IMM GRANULOCYTES NFR BLD: 0.2 %
INR PPP: 1.05 (ref 0.86–1.14)
INTERPRETATION ECG - MUSE: NORMAL
LYMPH ABN NFR CSF MANUAL: 95 %
LYMPHOCYTES # BLD AUTO: 1.8 10E9/L (ref 0.8–5.3)
LYMPHOCYTES NFR BLD AUTO: 38 %
MAGNESIUM SERPL-MCNC: 1.8 MG/DL (ref 1.6–2.3)
MCH RBC QN AUTO: 26.8 PG (ref 26.5–33)
MCHC RBC AUTO-ENTMCNC: 31.3 G/DL (ref 31.5–36.5)
MCV RBC AUTO: 86 FL (ref 78–100)
MONOCYTES # BLD AUTO: 0.4 10E9/L (ref 0–1.3)
MONOCYTES NFR BLD AUTO: 8.3 %
MONOS+MACROS NFR CSF MANUAL: 5 %
NEUTROPHILS # BLD AUTO: 2.5 10E9/L (ref 1.6–8.3)
NEUTROPHILS NFR BLD AUTO: 51 %
NRBC # BLD AUTO: 0 10*3/UL
NRBC BLD AUTO-RTO: 0 /100
PHOSPHATE SERPL-MCNC: 3.4 MG/DL (ref 2.5–4.5)
PLATELET # BLD AUTO: 169 10E9/L (ref 150–450)
POTASSIUM SERPL-SCNC: 3.5 MMOL/L (ref 3.4–5.3)
RBC # BLD AUTO: 3.99 10E12/L (ref 3.8–5.2)
RBC # CSF MANUAL: 3 /UL (ref 0–2)
SODIUM SERPL-SCNC: 145 MMOL/L (ref 133–144)
SPECIMEN EXP DATE BLD: NORMAL
TUBE # CSF: 4 #
WBC # BLD AUTO: 4.8 10E9/L (ref 4–11)
WBC # CSF MANUAL: 10 /UL (ref 0–5)

## 2019-09-25 PROCEDURE — T1013 SIGN LANG/ORAL INTERPRETER: HCPCS | Mod: U3

## 2019-09-25 PROCEDURE — 25800030 ZZH RX IP 258 OP 636: Performed by: PHYSICIAN ASSISTANT

## 2019-09-25 PROCEDURE — 86901 BLOOD TYPING SEROLOGIC RH(D): CPT | Performed by: STUDENT IN AN ORGANIZED HEALTH CARE EDUCATION/TRAINING PROGRAM

## 2019-09-25 PROCEDURE — 36415 COLL VENOUS BLD VENIPUNCTURE: CPT | Performed by: STUDENT IN AN ORGANIZED HEALTH CARE EDUCATION/TRAINING PROGRAM

## 2019-09-25 PROCEDURE — 40000065 ZZH STATISTIC EKG NON-CHARGEABLE

## 2019-09-25 PROCEDURE — 25000132 ZZH RX MED GY IP 250 OP 250 PS 637: Performed by: PHYSICIAN ASSISTANT

## 2019-09-25 PROCEDURE — 85730 THROMBOPLASTIN TIME PARTIAL: CPT | Performed by: STUDENT IN AN ORGANIZED HEALTH CARE EDUCATION/TRAINING PROGRAM

## 2019-09-25 PROCEDURE — 84100 ASSAY OF PHOSPHORUS: CPT | Performed by: PHYSICIAN ASSISTANT

## 2019-09-25 PROCEDURE — 83735 ASSAY OF MAGNESIUM: CPT | Performed by: PHYSICIAN ASSISTANT

## 2019-09-25 PROCEDURE — 80048 BASIC METABOLIC PNL TOTAL CA: CPT | Performed by: PHYSICIAN ASSISTANT

## 2019-09-25 PROCEDURE — 80048 BASIC METABOLIC PNL TOTAL CA: CPT | Performed by: STUDENT IN AN ORGANIZED HEALTH CARE EDUCATION/TRAINING PROGRAM

## 2019-09-25 PROCEDURE — 86850 RBC ANTIBODY SCREEN: CPT | Performed by: STUDENT IN AN ORGANIZED HEALTH CARE EDUCATION/TRAINING PROGRAM

## 2019-09-25 PROCEDURE — 40000141 ZZH STATISTIC PERIPHERAL IV START W/O US GUIDANCE

## 2019-09-25 PROCEDURE — 85610 PROTHROMBIN TIME: CPT | Performed by: STUDENT IN AN ORGANIZED HEALTH CARE EDUCATION/TRAINING PROGRAM

## 2019-09-25 PROCEDURE — 86900 BLOOD TYPING SEROLOGIC ABO: CPT | Performed by: STUDENT IN AN ORGANIZED HEALTH CARE EDUCATION/TRAINING PROGRAM

## 2019-09-25 PROCEDURE — 93010 ELECTROCARDIOGRAM REPORT: CPT | Mod: 59 | Performed by: INTERNAL MEDICINE

## 2019-09-25 PROCEDURE — 36415 COLL VENOUS BLD VENIPUNCTURE: CPT | Performed by: PHYSICIAN ASSISTANT

## 2019-09-25 PROCEDURE — 12000001 ZZH R&B MED SURG/OB UMMC

## 2019-09-25 PROCEDURE — 85025 COMPLETE CBC W/AUTO DIFF WBC: CPT | Performed by: PHYSICIAN ASSISTANT

## 2019-09-25 RX ORDER — CEFAZOLIN SODIUM 2 G/100ML
2 INJECTION, SOLUTION INTRAVENOUS
Status: DISCONTINUED | OUTPATIENT
Start: 2019-09-25 | End: 2019-09-26

## 2019-09-25 RX ORDER — CEFAZOLIN SODIUM 1 G/3ML
1 INJECTION, POWDER, FOR SOLUTION INTRAMUSCULAR; INTRAVENOUS SEE ADMIN INSTRUCTIONS
Status: DISCONTINUED | OUTPATIENT
Start: 2019-09-25 | End: 2019-09-26

## 2019-09-25 RX ADMIN — CETIRIZINE HYDROCHLORIDE 10 MG: 5 TABLET ORAL at 08:57

## 2019-09-25 RX ADMIN — ACETAMINOPHEN 650 MG: 325 TABLET, FILM COATED ORAL at 21:09

## 2019-09-25 RX ADMIN — SENNOSIDES AND DOCUSATE SODIUM 2 TABLET: 8.6; 5 TABLET ORAL at 08:57

## 2019-09-25 RX ADMIN — ACETAMINOPHEN 650 MG: 325 TABLET, FILM COATED ORAL at 16:25

## 2019-09-25 RX ADMIN — ALLOPURINOL 300 MG: 300 TABLET ORAL at 08:57

## 2019-09-25 RX ADMIN — POLYETHYLENE GLYCOL 3350 17 G: 17 POWDER, FOR SOLUTION ORAL at 08:56

## 2019-09-25 RX ADMIN — OMEPRAZOLE 20 MG: 20 CAPSULE, DELAYED RELEASE ORAL at 08:57

## 2019-09-25 RX ADMIN — SENNOSIDES AND DOCUSATE SODIUM 1 TABLET: 8.6; 5 TABLET ORAL at 21:09

## 2019-09-25 RX ADMIN — SODIUM CHLORIDE, PRESERVATIVE FREE: 5 INJECTION INTRAVENOUS at 13:32

## 2019-09-25 RX ADMIN — RANITIDINE 150 MG: 150 TABLET ORAL at 21:09

## 2019-09-25 RX ADMIN — MULTIPLE VITAMINS W/ MINERALS TAB 1 TABLET: TAB at 08:57

## 2019-09-25 ASSESSMENT — ACTIVITIES OF DAILY LIVING (ADL)
ADLS_ACUITY_SCORE: 14
ADLS_ACUITY_SCORE: 21
ADLS_ACUITY_SCORE: 21
ADLS_ACUITY_SCORE: 25
ADLS_ACUITY_SCORE: 21
ADLS_ACUITY_SCORE: 21

## 2019-09-25 ASSESSMENT — PAIN DESCRIPTION - DESCRIPTORS: DESCRIPTORS: ACHING

## 2019-09-25 ASSESSMENT — MIFFLIN-ST. JEOR: SCORE: 1006.72

## 2019-09-25 NOTE — PLAN OF CARE
VS stable, afebrile. Pt denied pain/SOB/n/v. Pt forgetful and impulsive during shift, bed alarm activated, would fail to call when needing to get out of bed, however, pt redirected and can follow directions. Good urine output through night. This AM neuro team was intending to get consent through iPad  for brain biopsy procedure - pt declined and stated she wanted son there while making decisions. Brain biopsy now put on hold until then. Pt slept comfortably. No significant events, continue POC.

## 2019-09-25 NOTE — PLAN OF CARE
[id number]:  NPO since 0800 for scheduled PET CT at 1400.  24 hour urine collection done and sent to lab.  Around 1345 pt became agitated and upset after a conversation with her son in pt's room.  Pt's son claimed pt had $800 cash, then changed to amount to $500 and then $600, yelled something to pt in [facility] and then stormed out of the room.  Pt then became very upset and agitated not wanting to go down for PET CT.  Writer was able to calm pt down via face to face [facility]  and pt was then agreeable to go to PET CT.  Heme/onc attending, 7D RN manager and charge aware.  SW consult placed for health management and community resources.  Upon return from PET CT, dejesus catheter was removed per MD order.  Voided spontaneously shortly after Dejesus removal.  Fair PO intake, on calorie counts.  Pt requested to put her bra on which has a small clear blue pouch attached to it, which looks like cash and credit cards.  Writer communicated w/ pt via  phone about sending items to security.  Pt was uncooperative and a face to face [facility]  has been requested per pt's preference and more responsive and engaged in the conversation.  Per Neurosurgery consult, possible biopsy for Thursday (9/26).  Bedside attendant present.  Continue to monitor and with POC.

## 2019-09-25 NOTE — PROGRESS NOTES
Pt informed nurse that she had a total of 660 in cash. She have it in a mini purple/blue plastic bag which had her cash and EBT card in. She first left it in her big purse in the bedside drawer. Pt later put purse in the closet and then decided to have the purse in the bed with her. She mentioned she haven't looked at her cash for a few days and when she did, 560 was missing. Right now she have 100 with her EBT card in the plastic bag clipped onto her bra. Around noon she mentioned one of her son is coming to borrow some money. At 315pm, during rounding, noticed son visiting patient.

## 2019-09-25 NOTE — PROGRESS NOTES
Calorie Count  Intake recorded for: 9/24  Total Kcals: 0 Total Protein: 0g  Kcals from Hospital Food: 0   Protein: 0g  Kcals from Outside Food (average):0 Protein: 0g  # Meals Recorded: 2 meals ordered from kitchen, no intake recorded.   # Supplements Recorded: no intake recorded.

## 2019-09-25 NOTE — PLAN OF CARE
ST session cancelled. Pt currently NPO for possible biopsy during scheduled  time. Will follow up as appropriate.

## 2019-09-25 NOTE — PROGRESS NOTES
"Neurosurgery Progress Note    S: Cytometry results negative for lymphoma     O:  /56 (BP Location: Right arm)   Pulse 63   Temp 97.5  F (36.4  C) (Oral)   Resp 16   Ht 1.499 m (4' 11\")   Wt 58.8 kg (129 lb 9.6 oz)   SpO2 96%   BMI 26.18 kg/m    Exam:  General: Awake;  Alert, In No Acute Distress  Pulm: Breathing Comfortably on room air  Mental status: Oriented x 3  Cranial Nerves: Cranial Nerves II-XII Intact Bilaterally  Strength:      Del Tr Bi WE WF Gr  R 5 5 5 5 5 5  L 5 5 5 5 5 5     HF KE KF DF PF   R 5 5 5 5 5   L 5 5 5 5 5     Pronator Drift: Absent  Sensory: Intact to Light Touch, except diminished sensation in bilateral legs in a non dermatomal distribution  Reflexes: No Hyperreflexia, Erwin s or Clonus Present; Toes Down-Going Bilaterally    Assessment:   69 year old female with history of CNS lymphoma in 2014 was in remission with a new surveillance MRI showing two new masses.    Recommendations:   -Planning for biopsy 9/25 versus 9/26 pending family decision        Addendum:   Updated by primary team that patient is declining biopsy.  Neurosurgery will sign off at this time.      ARIEL Diez, CNP  Department of Neurosurgery  Pager: [phone]    "

## 2019-09-25 NOTE — PROGRESS NOTES
The patient has been seen and evaluated by me, and I have reviewed today's vital signs, medications, labs, and imaging results independently. I have discussed the patient and plan with the team, and agree with the findings and plan outlined in this note. Key aspects of my evaluation, impression, and plan are as follows.     Ms. Negro is a 69 year old woman who was noted to have new intracranial masses on surveillance MRI. Has history of DLBCL of the cervix in 2007 and then CNS relapse in 2014 that had complete response to MT-R. Was seen in clinic with non-specific symtoms and no focal neuro deficits recently. Had a surveillance MRI that showed new masses concerning for recurrence. Admitted for expedited workup and management. Seen with  again today. No new issues.     VS reviewed.  Labs and imaging reviewed.     Presentation concerning for relapsed CNS lymphoma.   LP completed with negative flow.  Discussed need for biopsy to confirm diagnosis.   There has been multiple discussions with a  with te patient and the son regarding the need for a biopsy and an accurate diagnosis. The patient is competent to make decisions and despite many days of talking to her and her family she is not agreeable to a biopsy. Hence we have to proceed with an alternative plan.    Start dexamethasone 4 mg po q 6 hrs for 5 days [along with PPI]  Start rituxan in AM and weekly Rituxan*4.  Rad Onc to set up radiation as an outpatient.  Discharge on Friday    Juanito SOUZA MS  Attending Physician  Pager - [phone]  Email - [email or web address]    Progress Note  Hematology / Oncology     Date of Admission:  9/20/2019  Hospital Day #: 5   Date of Service (when I saw the patient): 09/25/2019    Assessment & Plan   Simran Negro is a 69 year old Hmong speaking female with a history of DLBCL with CNS relapse who achieved CR in 2014. A routine brain MRI on  "9/19/19 revealed two new b/l frontal lobe lesions concerning for recurrent lymphoma. She is being admitted for further workup and management.     #h/o DLBCL with CNS relapse s/p MT-R with CR 2014  #New frontal lobe masses  Follows with Dr. Dailey. History per most recent progress note, \"Diagnosed with DLBCL of the cervix in 06/2007 and was treated with 6 cycles of R-CHOP. She attained a CR, but in early 2014 developed difficulty concentrating, gait instability, and right-sided headaches with some blurry vision. Workup revealed an intra-axial right parietal mass. Stereotactic biopsy on 03/19/2014 which was diagnostic for DLBCL, non-germinal center type  Immunophenotypically this was similar to her previous diffuse large B-cell lymphoma, and this was presumed CNS relapse. She was started on MT-R (methotrexate, temozolomide and rituximab) therapy in 03/2014. Her course was complicated by line-related DVT, and she completed a course of enoxaparin for this. She attained a complete clinical and radiographic response after completing the MT-R treatment regimen and EA consolidation in 8/2014.\" A routine brain MRI on 9/19/19 revealed two new b/l frontal lobe lesions, left sided lesion is 4.2x3.2x3.9cm in size and right sided lesion is 1.9x2.7x3.3cm, concerning for recurrent lymphoma. Reports fatigue and poor appetite as most significant symptoms over the last month. Sons endorse she has been forgetful with slurred speech and unsteadiness when walking.  - Neurosurgery consult for biopsy consideration, recs appreciated. Prefer to pursue LP as initial step in diagnosis. Given negative CSF flow, NSG recommending brain biopsy, however, patient currently declining (see below).   - Consulted Med Procedure team, completed LP 9/23. CSF flow negative. Cryptococcus negative. Continue to follow Cysticercosis, and Toxoplasma.  - Hold off on steroids unless clinically unstable (e.g. focal neurologic symptoms, seizures)  - PET scan " completed on 9/24, final read is pending. 9/21 CT c/a/p showed no evidence of lymphoma   - Hep B core Angélica nonreactive (Hep B non-immune status)  - 24 hour urine completed, Cr 0.83  - Discussed extensively with patient, and her son Jeffery (via phone) with  present. Discussed that biopsy is the best way to figure out diagnosis so that we can best treat whatever process is going on in her brain. With out a diagnosis treatment would be limited (if even offered at all), and would likely not be as effective as her last chemo cycle was which allowed for nearly 5 years without disease. Patient expressed a lot of doubt as to what was the best way to proceed. He son urged her to make a decision, stating he would not make he decision for her. Ultimately, Simran decided to try to pursue treatment options without biopsy. Will discuss with Dr. Dailey.    #Dysphagia  - SLP consult, appreciate assistance  - Diet advanced to Dysphagia level 3 with thin liquids     #TLS, low risk currently  Uric acid 8.0 on admission. No other evidence of TLS. No e/o DIC on admission. Improved to 3.4 on 9/24.  -  mL/hr  - Continue allopurinol 300 mg daily.      #GERD  - Continue PTA Omeprazole and Ranitidine     #Insomnia  - Continue PTA Ambien prn     #Moderate malnutrition  Present on admission. Reports fatigue and poor appetite over the last month. Has lost about 15 lbs.  - Encourage small, frequent meals  - Supplements between meals     FEN  -  mL/hr  - Regular diet  - Replace lytes prn     PPx  - VTE: Hold currently given LP  - GI: PTA Omeprazole/Ranitidine     Dispo: Discharge pending potential biopsy or other evaluation to establish diagnosis. Anticipate additional 2-3 days inpatient.   Follow up: Needs are pending at this time    The plan was discussed with staff physician, Dr. Leary.    Mariposa Padilla, PAElizabethC  Hematology/Oncology  Pager: [phone]    Interval History   Patient seen with a professional .  "Simran says she is \"the same\" this morning, offers no new concerns or complaints. She again expresses that last time she had significant pain and HA. Explained that although she does not have these symtpoms now, she is at risk for developing these symptoms, or other symptoms, if treatment is not started promptly. Discussed possibility of brain biopsy with patient, she was initially agreeable if her family was present. Called son and discussed over speaker phone. Clarified that biopsy diagnosis is necessary for treatment. Patient very unsure about what to do. Ultimately decided to decline biopsy.     Physical Exam   Vital Signs with Ranges  Temp:  [97.5  F (36.4  C)-98.9  F (37.2  C)] 97.5  F (36.4  C)  Pulse:  [61-91] 63  Resp:  [16] 16  BP: (104-129)/(52-72) 123/56  SpO2:  [95 %-97 %] 96 %    I/O last 3 completed shifts:  In: 800 [I.V.:800]  Out: 2250 [Urine:2250]    Vitals:    09/22/19 0819 09/23/19 1000 09/25/19 0802   Weight: 58.5 kg (128 lb 14.4 oz) 58.8 kg (129 lb 9.6 oz) 56.6 kg (124 lb 12.8 oz)     Constitutional: Pleasant adult female seen lying in bed. Awake, alert, NAD.  HEENT: NC/AT, EOMI, sclera clear, conjunctiva normal  Respiratory: No increased work of breathing, clear in anterior lung fields, no crackles or wheezing.  Cardiovascular: RRR, no murmur noted. No peripheral edema.  GI: non-distended.  Skin: Warm, dry, well-perfused. No bruising, bleeding, rashes, or lesions on limited exam.  Neuropsychiatric: Calm, appropriate affect    Medications     - MEDICATION INSTRUCTIONS -       sodium chloride 100 mL/hr at 09/24/19 1800         allopurinol  300 mg Oral Daily     ceFAZolin  1 g Intravenous See Admin Instructions     ceFAZolin  2 g Intravenous Pre-Op/Pre-procedure x 1 dose     cetirizine  10 mg Oral Daily     multivitamin w/minerals  1 tablet Oral Daily     omeprazole  20 mg Oral QAM AC     polyethylene glycol  17 g Oral Daily     ranitidine  150 mg Oral At Bedtime     senna-docusate  1 tablet Oral " BID    Or     senna-docusate  2 tablet Oral BID     sodium chloride (PF)  3 mL Intracatheter Q8H       Antiinfectives  Anti-infectives (From now, onward)    Start     Dose/Rate Route Frequency Ordered Stop    09/25/19 0657  ceFAZolin (ANCEF) intermittent infusion 2 g in 100 mL dextrose PRE-MIX      2 g  200 mL/hr over 30 Minutes Intravenous PRE-OP/PRE-PROCEDURE 09/25/19 0657      09/25/19 0657  ceFAZolin (ANCEF) 1 g vial to attach to  ml bag for ADULT or 50 ml bag for PEDS      1 g  over 30 Minutes Intravenous SEE ADMIN INSTRUCTIONS 09/25/19 0657            Data   CBC   Recent Labs   Lab 09/25/19  0621 09/24/19  0459 09/23/19  0609 09/22/19  0618   WBC 4.8 5.3 5.8 5.6   RBC 3.99 3.84 4.10 4.13   HGB 10.7* 10.4* 11.3* 11.1*   HCT 34.2* 33.0* 35.3 35.8   MCV 86 86 86 87   MCH 26.8 27.1 27.6 26.9   MCHC 31.3* 31.5 32.0 31.0*   RDW 12.5 12.5 12.4 12.5    158 168 172       CMP   Recent Labs   Lab 09/25/19  0621 09/24/19  0459 09/23/19  0609 09/22/19  0618  09/20/19  1432 09/19/19  1445   * 145* 143 142   < > 139 138   POTASSIUM 3.5 3.5 3.8 4.2   < > 3.8 3.8   CHLORIDE 113* 113* 112* 111*   < > 105 105   CO2 26 24 24 24   < > 24 27   ANIONGAP 6 8 7 8   < > 11 6   GLC 94 100* 107* 116*   < > 176* 207*   BUN 8 7 9 12   < > 18 19   CR 0.81 0.83 0.81 0.96   < > 1.00 0.91   GFRESTIMATED 74 73 74 61   < > 58* 65   GFRESTBLACK 86 84 86 70   < > 67 75   EMMA 8.3* 8.0* 8.6 8.7   < > 8.6 8.7   MAG 1.8  --   --   --   --  2.1  --    PHOS 3.4  --   --   --   --  3.9  --    PROTTOTAL  --   --   --   --   --  6.8 6.9   ALBUMIN  --   --   --   --   --  3.5 3.6   BILITOTAL  --   --   --   --   --  0.7 0.6   ALKPHOS  --   --   --   --   --  76 83   AST  --   --   --   --   --  19 19   ALT  --   --   --   --   --  42 47    < > = values in this interval not displayed.       LFTs   Recent Labs   Lab 09/20/19  1432   PROTTOTAL 6.8   ALBUMIN 3.5   BILITOTAL 0.7   ALKPHOS 76   AST 19   ALT 42       Coagulation Studies   Recent  Labs   Lab 09/25/19  1041   INR 1.05   PTT 30

## 2019-09-25 NOTE — PROGRESS NOTES
[id number]    Neuro: A&Ox4. Forgetful/impulsive. Hmong speaking, refusing phone , would like face to face , no family at bedside    Cardiac: Afebrile, VSS.   Respiratory: RA   GI/: Voiding spontaneously. No BM this shift.   Diet/appetite: Tolerating regular diet. Denies nausea   Activity: Up with 1 assist    Pain: . Denies   Skin: No new deficits noted.  Lines: PIV infusing NS at 100cc/hr     Sitter at bedside, reassess need overnight now that dejesus is out   Possible brain biopsy Thursday   Pt has been resting comfortably throughout shift, will continue to monitor and follow plan of care.

## 2019-09-25 NOTE — PLAN OF CARE
[id number]:    VS stable, afebrile. Pt denied pain/SOB/n/v. Pt forgetful; bed alarm activated, would fail to call when needing to get out of bed, however, pt redirected and can follow directions. Good urine output. Pt have head CT scheduled and refused unless son is present. Team try to get brain biopsy consent but pt refused unless son present as well. Pt was NPO until noon and on regular diet. CT was canceled. Pt was on DD3 thin liquid before hand. Speech wanted to see her this morning but did not because NPO status, speech was going to try again tomorrow. Team had her on regular after brain biopsy on hold and nurse observed pt eat. Pt had chicken stir fried with rice and ate without difficulty. Plan is to have pt and family members make a final decision about brain biopsy by tonight. PIV infusing NS 100ml/hr. Pt ambulated in hallway without difficulty. No BM on shift. Continue to monitor and follow POC.    Per Mobility Level Guideline;  Bed/chair alarm: Bed alarm in place   Patient may ambulate: Yes, pt able to ambulate  Patient requires the following assistive equipment: SBA with GB and pt uses IV pole.   PT/OT consult orders in place: No PT/OT orders at this shift

## 2019-09-26 ENCOUNTER — OFFICE VISIT (OUTPATIENT)
Dept: INTERPRETER SERVICES | Facility: CLINIC | Age: 69
End: 2019-09-26
Payer: COMMERCIAL

## 2019-09-26 ENCOUNTER — APPOINTMENT (OUTPATIENT)
Dept: SPEECH THERAPY | Facility: CLINIC | Age: 69
DRG: 841 | End: 2019-09-26
Attending: INTERNAL MEDICINE
Payer: COMMERCIAL

## 2019-09-26 LAB
ALBUMIN SERPL-MCNC: 3.2 G/DL (ref 3.4–5)
ALP SERPL-CCNC: 68 U/L (ref 40–150)
ALT SERPL W P-5'-P-CCNC: 30 U/L (ref 0–50)
ANION GAP SERPL CALCULATED.3IONS-SCNC: 6 MMOL/L (ref 3–14)
ANION GAP SERPL CALCULATED.3IONS-SCNC: 9 MMOL/L (ref 3–14)
AST SERPL W P-5'-P-CCNC: 25 U/L (ref 0–45)
BASOPHILS # BLD AUTO: 0 10E9/L (ref 0–0.2)
BASOPHILS # BLD AUTO: 0 10E9/L (ref 0–0.2)
BASOPHILS NFR BLD AUTO: 0.5 %
BASOPHILS NFR BLD AUTO: 0.5 %
BILIRUB SERPL-MCNC: 0.6 MG/DL (ref 0.2–1.3)
BUN SERPL-MCNC: 11 MG/DL (ref 7–30)
BUN SERPL-MCNC: 9 MG/DL (ref 7–30)
CALCIUM SERPL-MCNC: 8 MG/DL (ref 8.5–10.1)
CALCIUM SERPL-MCNC: 8.1 MG/DL (ref 8.5–10.1)
CHLORIDE SERPL-SCNC: 112 MMOL/L (ref 94–109)
CHLORIDE SERPL-SCNC: 114 MMOL/L (ref 94–109)
CO2 SERPL-SCNC: 23 MMOL/L (ref 20–32)
CO2 SERPL-SCNC: 24 MMOL/L (ref 20–32)
CREAT SERPL-MCNC: 0.76 MG/DL (ref 0.52–1.04)
CREAT SERPL-MCNC: 0.85 MG/DL (ref 0.52–1.04)
DIFFERENTIAL METHOD BLD: ABNORMAL
DIFFERENTIAL METHOD BLD: ABNORMAL
EOSINOPHIL # BLD AUTO: 0.1 10E9/L (ref 0–0.7)
EOSINOPHIL # BLD AUTO: 0.1 10E9/L (ref 0–0.7)
EOSINOPHIL NFR BLD AUTO: 1.4 %
EOSINOPHIL NFR BLD AUTO: 2.2 %
ERYTHROCYTE [DISTWIDTH] IN BLOOD BY AUTOMATED COUNT: 12.6 % (ref 10–15)
ERYTHROCYTE [DISTWIDTH] IN BLOOD BY AUTOMATED COUNT: 12.8 % (ref 10–15)
GFR SERPL CREATININE-BSD FRML MDRD: 71 ML/MIN/{1.73_M2}
GFR SERPL CREATININE-BSD FRML MDRD: 81 ML/MIN/{1.73_M2}
GLUCOSE SERPL-MCNC: 93 MG/DL (ref 70–99)
GLUCOSE SERPL-MCNC: 99 MG/DL (ref 70–99)
HCT VFR BLD AUTO: 34 % (ref 35–47)
HCT VFR BLD AUTO: 35.6 % (ref 35–47)
HGB BLD-MCNC: 10.8 G/DL (ref 11.7–15.7)
HGB BLD-MCNC: 11.3 G/DL (ref 11.7–15.7)
IMM GRANULOCYTES # BLD: 0 10E9/L (ref 0–0.4)
IMM GRANULOCYTES # BLD: 0 10E9/L (ref 0–0.4)
IMM GRANULOCYTES NFR BLD: 0.2 %
IMM GRANULOCYTES NFR BLD: 0.5 %
LYMPHOCYTES # BLD AUTO: 1.8 10E9/L (ref 0.8–5.3)
LYMPHOCYTES # BLD AUTO: 2 10E9/L (ref 0.8–5.3)
LYMPHOCYTES NFR BLD AUTO: 31.8 %
LYMPHOCYTES NFR BLD AUTO: 35.5 %
MCH RBC QN AUTO: 27.3 PG (ref 26.5–33)
MCH RBC QN AUTO: 27.4 PG (ref 26.5–33)
MCHC RBC AUTO-ENTMCNC: 31.7 G/DL (ref 31.5–36.5)
MCHC RBC AUTO-ENTMCNC: 31.8 G/DL (ref 31.5–36.5)
MCV RBC AUTO: 86 FL (ref 78–100)
MCV RBC AUTO: 86 FL (ref 78–100)
MONOCYTES # BLD AUTO: 0.3 10E9/L (ref 0–1.3)
MONOCYTES # BLD AUTO: 0.4 10E9/L (ref 0–1.3)
MONOCYTES NFR BLD AUTO: 5.9 %
MONOCYTES NFR BLD AUTO: 7.6 %
NEUTROPHILS # BLD AUTO: 3.2 10E9/L (ref 1.6–8.3)
NEUTROPHILS # BLD AUTO: 3.2 10E9/L (ref 1.6–8.3)
NEUTROPHILS NFR BLD AUTO: 56.2 %
NEUTROPHILS NFR BLD AUTO: 57.7 %
NRBC # BLD AUTO: 0 10*3/UL
NRBC # BLD AUTO: 0 10*3/UL
NRBC BLD AUTO-RTO: 0 /100
NRBC BLD AUTO-RTO: 0 /100
PLATELET # BLD AUTO: 172 10E9/L (ref 150–450)
PLATELET # BLD AUTO: 181 10E9/L (ref 150–450)
POTASSIUM SERPL-SCNC: 3.2 MMOL/L (ref 3.4–5.3)
POTASSIUM SERPL-SCNC: 3.5 MMOL/L (ref 3.4–5.3)
PROT SERPL-MCNC: 6.2 G/DL (ref 6.8–8.8)
RBC # BLD AUTO: 3.95 10E12/L (ref 3.8–5.2)
RBC # BLD AUTO: 4.13 10E12/L (ref 3.8–5.2)
SODIUM SERPL-SCNC: 144 MMOL/L (ref 133–144)
SODIUM SERPL-SCNC: 145 MMOL/L (ref 133–144)
WBC # BLD AUTO: 5.5 10E9/L (ref 4–11)
WBC # BLD AUTO: 5.6 10E9/L (ref 4–11)

## 2019-09-26 PROCEDURE — 25000132 ZZH RX MED GY IP 250 OP 250 PS 637: Performed by: INTERNAL MEDICINE

## 2019-09-26 PROCEDURE — 25000128 H RX IP 250 OP 636: Performed by: INTERNAL MEDICINE

## 2019-09-26 PROCEDURE — 84132 ASSAY OF SERUM POTASSIUM: CPT | Performed by: INTERNAL MEDICINE

## 2019-09-26 PROCEDURE — 87799 DETECT AGENT NOS DNA QUANT: CPT | Performed by: PHYSICIAN ASSISTANT

## 2019-09-26 PROCEDURE — 25000131 ZZH RX MED GY IP 250 OP 636 PS 637: Performed by: PHYSICIAN ASSISTANT

## 2019-09-26 PROCEDURE — 25000132 ZZH RX MED GY IP 250 OP 250 PS 637: Performed by: PHYSICIAN ASSISTANT

## 2019-09-26 PROCEDURE — 80053 COMPREHEN METABOLIC PANEL: CPT | Performed by: INTERNAL MEDICINE

## 2019-09-26 PROCEDURE — 92526 ORAL FUNCTION THERAPY: CPT | Mod: GN

## 2019-09-26 PROCEDURE — 25800030 ZZH RX IP 258 OP 636: Performed by: PHYSICIAN ASSISTANT

## 2019-09-26 PROCEDURE — 36415 COLL VENOUS BLD VENIPUNCTURE: CPT | Performed by: PHYSICIAN ASSISTANT

## 2019-09-26 PROCEDURE — 85025 COMPLETE CBC W/AUTO DIFF WBC: CPT | Performed by: PHYSICIAN ASSISTANT

## 2019-09-26 PROCEDURE — 40000556 ZZH STATISTIC PERIPHERAL IV START W US GUIDANCE

## 2019-09-26 PROCEDURE — 85025 COMPLETE CBC W/AUTO DIFF WBC: CPT | Performed by: INTERNAL MEDICINE

## 2019-09-26 PROCEDURE — 36415 COLL VENOUS BLD VENIPUNCTURE: CPT | Performed by: INTERNAL MEDICINE

## 2019-09-26 PROCEDURE — 87799 DETECT AGENT NOS DNA QUANT: CPT | Performed by: INTERNAL MEDICINE

## 2019-09-26 PROCEDURE — 12000001 ZZH R&B MED SURG/OB UMMC

## 2019-09-26 PROCEDURE — 25800030 ZZH RX IP 258 OP 636: Performed by: INTERNAL MEDICINE

## 2019-09-26 PROCEDURE — T1013 SIGN LANG/ORAL INTERPRETER: HCPCS | Mod: U3

## 2019-09-26 PROCEDURE — 80048 BASIC METABOLIC PNL TOTAL CA: CPT | Performed by: PHYSICIAN ASSISTANT

## 2019-09-26 RX ORDER — NALOXONE HYDROCHLORIDE 0.4 MG/ML
.1-.4 INJECTION, SOLUTION INTRAMUSCULAR; INTRAVENOUS; SUBCUTANEOUS
Status: DISCONTINUED | OUTPATIENT
Start: 2019-09-26 | End: 2019-09-27 | Stop reason: HOSPADM

## 2019-09-26 RX ORDER — MEPERIDINE HYDROCHLORIDE 25 MG/ML
25 INJECTION INTRAMUSCULAR; INTRAVENOUS; SUBCUTANEOUS EVERY 30 MIN PRN
Status: DISCONTINUED | OUTPATIENT
Start: 2019-09-26 | End: 2019-09-27 | Stop reason: HOSPADM

## 2019-09-26 RX ORDER — EPINEPHRINE 1 MG/ML
0.3 INJECTION, SOLUTION, CONCENTRATE INTRAVENOUS EVERY 5 MIN PRN
Status: DISCONTINUED | OUTPATIENT
Start: 2019-09-26 | End: 2019-09-27 | Stop reason: HOSPADM

## 2019-09-26 RX ORDER — LORAZEPAM 0.5 MG/1
.5-1 TABLET ORAL EVERY 6 HOURS PRN
Status: DISCONTINUED | OUTPATIENT
Start: 2019-09-26 | End: 2019-09-26

## 2019-09-26 RX ORDER — ACETAMINOPHEN 325 MG/1
650 TABLET ORAL ONCE
Status: COMPLETED | OUTPATIENT
Start: 2019-09-26 | End: 2019-09-26

## 2019-09-26 RX ORDER — ALBUTEROL SULFATE 0.83 MG/ML
2.5 SOLUTION RESPIRATORY (INHALATION)
Status: DISCONTINUED | OUTPATIENT
Start: 2019-09-26 | End: 2019-09-27 | Stop reason: HOSPADM

## 2019-09-26 RX ORDER — ALBUTEROL SULFATE 90 UG/1
1-2 AEROSOL, METERED RESPIRATORY (INHALATION)
Status: DISCONTINUED | OUTPATIENT
Start: 2019-09-26 | End: 2019-09-27 | Stop reason: HOSPADM

## 2019-09-26 RX ORDER — METHYLPREDNISOLONE SODIUM SUCCINATE 125 MG/2ML
125 INJECTION, POWDER, LYOPHILIZED, FOR SOLUTION INTRAMUSCULAR; INTRAVENOUS
Status: DISCONTINUED | OUTPATIENT
Start: 2019-09-26 | End: 2019-09-27 | Stop reason: HOSPADM

## 2019-09-26 RX ORDER — PROCHLORPERAZINE MALEATE 5 MG
5-10 TABLET ORAL EVERY 6 HOURS PRN
Status: DISCONTINUED | OUTPATIENT
Start: 2019-09-26 | End: 2019-09-26

## 2019-09-26 RX ORDER — SODIUM CHLORIDE 9 MG/ML
1000 INJECTION, SOLUTION INTRAVENOUS CONTINUOUS PRN
Status: DISCONTINUED | OUTPATIENT
Start: 2019-09-26 | End: 2019-09-27 | Stop reason: HOSPADM

## 2019-09-26 RX ORDER — SIMETHICONE 80 MG
80 TABLET,CHEWABLE ORAL EVERY 6 HOURS PRN
Status: DISCONTINUED | OUTPATIENT
Start: 2019-09-26 | End: 2019-09-27 | Stop reason: HOSPADM

## 2019-09-26 RX ORDER — DIPHENHYDRAMINE HCL 50 MG
50 CAPSULE ORAL ONCE
Status: COMPLETED | OUTPATIENT
Start: 2019-09-26 | End: 2019-09-26

## 2019-09-26 RX ORDER — DEXAMETHASONE 4 MG/1
40 TABLET ORAL DAILY
Status: DISCONTINUED | OUTPATIENT
Start: 2019-09-26 | End: 2019-09-27 | Stop reason: HOSPADM

## 2019-09-26 RX ORDER — DIPHENHYDRAMINE HYDROCHLORIDE 50 MG/ML
50 INJECTION INTRAMUSCULAR; INTRAVENOUS
Status: DISCONTINUED | OUTPATIENT
Start: 2019-09-26 | End: 2019-09-27 | Stop reason: HOSPADM

## 2019-09-26 RX ADMIN — SODIUM CHLORIDE, PRESERVATIVE FREE: 5 INJECTION INTRAVENOUS at 00:27

## 2019-09-26 RX ADMIN — RANITIDINE 150 MG: 150 TABLET ORAL at 21:11

## 2019-09-26 RX ADMIN — RITUXIMAB 600 MG: 10 INJECTION, SOLUTION INTRAVENOUS at 16:12

## 2019-09-26 RX ADMIN — ACETAMINOPHEN 650 MG: 325 TABLET, FILM COATED ORAL at 07:36

## 2019-09-26 RX ADMIN — ALLOPURINOL 300 MG: 300 TABLET ORAL at 08:09

## 2019-09-26 RX ADMIN — SIMETHICONE CHEW TAB 80 MG 80 MG: 80 TABLET ORAL at 20:17

## 2019-09-26 RX ADMIN — MULTIPLE VITAMINS W/ MINERALS TAB 1 TABLET: TAB at 08:09

## 2019-09-26 RX ADMIN — SODIUM CHLORIDE, PRESERVATIVE FREE: 5 INJECTION INTRAVENOUS at 10:40

## 2019-09-26 RX ADMIN — DIPHENHYDRAMINE HYDROCHLORIDE 50 MG: 50 CAPSULE ORAL at 15:34

## 2019-09-26 RX ADMIN — OMEPRAZOLE 20 MG: 20 CAPSULE, DELAYED RELEASE ORAL at 08:09

## 2019-09-26 RX ADMIN — CETIRIZINE HYDROCHLORIDE 10 MG: 5 TABLET ORAL at 08:09

## 2019-09-26 RX ADMIN — ACETAMINOPHEN 650 MG: 325 TABLET, FILM COATED ORAL at 15:36

## 2019-09-26 RX ADMIN — DEXAMETHASONE 40 MG: 4 TABLET ORAL at 15:33

## 2019-09-26 RX ADMIN — SODIUM CHLORIDE, PRESERVATIVE FREE: 5 INJECTION INTRAVENOUS at 22:02

## 2019-09-26 RX ADMIN — POTASSIUM CHLORIDE 40 MEQ: 750 TABLET, EXTENDED RELEASE ORAL at 06:47

## 2019-09-26 ASSESSMENT — ACTIVITIES OF DAILY LIVING (ADL)
ADLS_ACUITY_SCORE: 25
ADLS_ACUITY_SCORE: 25
ADLS_ACUITY_SCORE: 16
ADLS_ACUITY_SCORE: 16
ADLS_ACUITY_SCORE: 15.5
ADLS_ACUITY_SCORE: 25

## 2019-09-26 ASSESSMENT — MIFFLIN-ST. JEOR: SCORE: 1032.57

## 2019-09-26 ASSESSMENT — PAIN DESCRIPTION - DESCRIPTORS
DESCRIPTORS: ACHING
DESCRIPTORS: ACHING

## 2019-09-26 NOTE — PLAN OF CARE
"/62 (BP Location: Left arm)   Pulse 82   Temp 95.2  F (35.1  C) (Oral)   Resp 18   Ht 1.499 m (4' 11\")   Wt 59.2 kg (130 lb 8 oz)   SpO2 99%   BMI 26.36 kg/m      Neuro: Hmong speaking. Forgetful and disoriented to time. Attendant at bedside d/t fall early this morning and impulsivity. Pt expressed frustration with hospital stay and states she just wants to go home  Cardiac: VSS  Respiratory:  Maintaining adequate O2 sats via RA  GI/: Voiding without difficulty. Incontinent of loose BMs  Diet/appetite: Advanced to regular diet. Fair appetite. No nausea noted  Activity:  Assist of 1. Pt showered today and worked with therapy  Pain: Using tylenol for pain relief  Skin: No new skin deficits noted  Labs: K+ 3.2, pt refused the last 20 meq potassium replacement per protocol. States she is tired of taking pills  LDA's: NS infusing at 100 mL/hr through PIV    Plan: Plan to get chemo today and discharge home tomorrow at 1pm. Nursing will continue to follow the POC and update the MD team with concerns.    "

## 2019-09-26 NOTE — PLAN OF CARE
Discharge Planner SLP   Patient plan for discharge: none stated   Current status: SLP: Pt with improved PO tolerance today. Recommend pt advance to regular textures and thin liquids with supervision. Caregivers to encourage upright positioning for all PO intake, slow pacing, small single sips/bites, and alternating between consistencies. ST to continue to follow.   Barriers to return to prior living situation: medical readiness   Recommendations for discharge: defer to MD  Rationale for recommendations: anticipate pt will meet goals prior to discharge        Entered by: Migdalia Hogan 09/26/2019 10:49 AM

## 2019-09-26 NOTE — PLAN OF CARE
AVSS.  Pt fell overnight.  Did not call before getting up to use the commode.  Bed alarm was on and nearby staff responded.  She was observed sitting on the floor.  She was helped to the commode and then back to bed.  Pt denied any injuries and was stable after fall.  NST at the bedside for the remainder of the shift.  Continue to monitor.    Per Mobility Level Guideline;  Bed/chair alarm Yes.  Patient may ambulate stand by assist  Patient requires the following assistive equipment: gait belt   PT/OT consult orders in place No

## 2019-09-26 NOTE — PROVIDER NOTIFICATION
DATE/TIME  (DOT-TD, DOT-NOW) CHEMO CHECK ACTIVITY (REGIMEN & DOSE CHECK, DAY, DOSE #, NAME OF CHEMO #1)  CHEMO DRUG #2  CHEMO DRUG #3 NAME OF RN #1 (USE DOT-ME HERE) NAME OF RN#2 (2ND RN TO LOG IN SEPARATELY)   9/26/2019  1:18 PM   Rituxan protocol check    ANYA Gastelum RN     9/26/2019  3:28 PM   Rituxan dose #1 double check   ANYA Cintron RN

## 2019-09-26 NOTE — PROGRESS NOTES
Calorie Count    Intake recorded for: 9/25       Total Kcals: 365       Total Protein: 24g    Kcals from Hospital Food: 365                       Protein: 24g    Kcals from Outside Food (average):0            Protein: 0g    # Meals Recorded: 2 meals (First - 50% fruit cup, garlic desiree chicken stir ramirez w/ veggies & white rice)                                                    (Second - 25% hot roast beef w/ gravy, sausage nevin w/ gravy, green beans, hot green tea)    # Supplements Recorded: 0

## 2019-09-26 NOTE — PROGRESS NOTES
"[facility], [place]    Progress Note  Hematology / Oncology     Date of Admission:  9/20/2019  Hospital Day #: 6   Date of Service (when I saw the patient): 09/26/2019    Assessment & Plan   Simran Negro is a 69 year old ong speaking female with a history of DLBCL with CNS relapse who achieved CR in 2014. A routine brain MRI on 9/19/19 revealed two new b/l frontal lobe lesions concerning for recurrent lymphoma. She is being admitted for further workup and management.     #h/o DLBCL with CNS relapse s/p MT-R with CR 2014  #New frontal lobe masses  Follows with Dr. Dailey. History per most recent progress note, \"Diagnosed with DLBCL of the cervix in 06/2007 and was treated with 6 cycles of R-CHOP. She attained a CR, but in early 2014 developed difficulty concentrating, gait instability, and right-sided headaches with some blurry vision. Workup revealed an intra-axial right parietal mass. Stereotactic biopsy on 03/19/2014 which was diagnostic for DLBCL, non-germinal center type  Immunophenotypically this was similar to her previous diffuse large B-cell lymphoma, and this was presumed CNS relapse. She was started on MT-R (methotrexate, temozolomide and rituximab) therapy in 03/2014. Her course was complicated by line-related DVT, and she completed a course of enoxaparin for this. She attained a complete clinical and radiographic response after completing the MT-R treatment regimen and EA consolidation in 8/2014.\" A routine brain MRI on 9/19/19 revealed two new b/l frontal lobe lesions, left sided lesion is 4.2x3.2x3.9cm in size and right sided lesion is 1.9x2.7x3.3cm, concerning for recurrent lymphoma. Reports fatigue and poor appetite as most significant symptoms over the last month. Sons endorse she has been forgetful with slurred speech and unsteadiness when walking.  - Neurosurgery consult for biopsy consideration, recs appreciated. Prefer to pursue LP as initial step in diagnosis. " Given negative CSF flow, NSG recommending brain biopsy, however, patient declined (see below).   - Consulted Med Procedure team, completed LP 9/23. CSF flow negative. Cryptococcus negative. Continue to follow Cysticercosis, and Toxoplasma.  - Hold off on steroids unless clinically unstable (e.g. focal neurologic symptoms, seizures)  - PET scan completed on 9/24, final read is pending. 9/21 CT c/a/p showed no evidence of lymphoma   - Hep B core Angélica nonreactive (Hep B non-immune status)  - 24 hour urine completed, Cr 0.83. Will not be giving methotrexate as no biopsy to confirm diagnosis.   - Discussed extensively with patient, and her son Jeffery (via phone) with  present on 9/25. Discussed that biopsy is the best way to figure out diagnosis so that we can best treat whatever process is going on in her brain. With out a diagnosis treatment would be limited (if even offered at all), and would likely not be as effective as her last chemo cycle was which allowed for nearly 5 years without disease. Patient expressed a lot of doubt as to what was the best way to proceed. He son urged her to make a decision, stating he would not make he decision for her. Ultimately, Simran decided to try to pursue treatment options without biopsy. Discussed with Dr. Dailey will offer her dexamethasone 40mg daily x 5 days with weekly rituximab (first dose to be given 9/26). Will also plan to start radiation as outpatient.     #Dysphagia  - SLP consult, appreciate assistance  - Diet advanced to regular diet with thin liquids     #TLS, low risk currently  Uric acid 8.0 on admission. No other evidence of TLS. No e/o DIC on admission. Improved to 3.4 on 9/24.  -  mL/hr  - Continue allopurinol 300 mg daily.      #GERD  - Continue PTA Omeprazole and Ranitidine     #Insomnia  - Continue PTA Ambien prn     #Moderate malnutrition  Present on admission. Reports fatigue and poor appetite over the last month. Has lost about 15 lbs.  -  Encourage small, frequent meals  - Supplements between meals     FEN  -  mL/hr  - Regular diet  - Replace lytes prn     PPx  - VTE: Hold currently given LP  - GI: PTA Omeprazole/Ranitidine     Dispo: Plan to discharge to home tomorrow 9/27  Follow up: Plan for provider appt prior to next week's rituximab. Discussed plan for radiation with Rad Onc, they will plan to sim tomorrow prior to discharge.     The plan was discussed with staff physician, Dr. Roa.    Mariposa Padilla PA-C  Hematology/Oncology  Pager: [phone]    Interval History   Patient seen with a professional . Son, Martha, on speakerphone during conversation. Simran says she is very tired of being in the hospital. She expressed a lot of frustration with how little had been done since she has been in the hospital. Discussed plan to start treatment today and discharge from hospital tomorrow. She was ok with this plan. Discussed with son timing of discharge for 1 PM.    Physical Exam   Vital Signs with Ranges  Temp:  [95.2  F (35.1  C)-98.4  F (36.9  C)] 95.2  F (35.1  C)  Pulse:  [55-82] 82  Resp:  [16-18] 18  BP: (112-121)/(45-62) 121/62  SpO2:  [95 %-99 %] 99 %    I/O last 3 completed shifts:  In: 2700 [P.O.:300; I.V.:2400]  Out: 1250 [Urine:1250]    Vitals:    09/23/19 1000 09/25/19 0802 09/26/19 1045   Weight: 58.8 kg (129 lb 9.6 oz) 56.6 kg (124 lb 12.8 oz) 59.2 kg (130 lb 8 oz)     Constitutional: Pleasant adult female seen walking in hallway and sitting up in chair. Awake, alert, NAD.  HEENT: NC/AT, EOMI, sclera clear, conjunctiva normal  Respiratory: No increased work of breathing, clear in anterior lung fields, no crackles or wheezing.  Cardiovascular: RRR, no murmur noted. No peripheral edema.  GI: non-distended.  MSK: knees without abrasions or bruises, slightly tender to palpation.  Skin: Warm, dry, well-perfused. No bruising, bleeding, rashes, or lesions on limited exam.  Neuropsychiatric: Calm, appropriate affect    Medications      - MEDICATION INSTRUCTIONS -       - MEDICATION INSTRUCTIONS -       sodium chloride       sodium chloride 100 mL/hr at 09/26/19 1040         acetaminophen  650 mg Oral Once     allopurinol  300 mg Oral Daily     cetirizine  10 mg Oral Daily     diphenhydrAMINE  50 mg Oral Once     multivitamin w/minerals  1 tablet Oral Daily     omeprazole  20 mg Oral QAM AC     polyethylene glycol  17 g Oral Daily     ranitidine  150 mg Oral At Bedtime     riTUXimab (RITUXAN) infusion  375 mg/m2 (Treatment Plan Recorded) Intravenous Once     senna-docusate  1 tablet Oral BID    Or     senna-docusate  2 tablet Oral BID     sodium chloride (PF)  3 mL Intracatheter Q8H       Antiinfectives  Anti-infectives (From now, onward)    None          Data   CBC   Recent Labs   Lab 09/26/19  0522 09/25/19  0621 09/24/19  0459 09/23/19  0609   WBC 5.5 4.8 5.3 5.8   RBC 3.95 3.99 3.84 4.10   HGB 10.8* 10.7* 10.4* 11.3*   HCT 34.0* 34.2* 33.0* 35.3   MCV 86 86 86 86   MCH 27.3 26.8 27.1 27.6   MCHC 31.8 31.3* 31.5 32.0   RDW 12.6 12.5 12.5 12.4    169 158 168       CMP   Recent Labs   Lab 09/26/19  0522 09/25/19  0621 09/24/19  0459 09/23/19  0609  09/20/19  1432 09/19/19  1445   * 145* 145* 143   < > 139 138   POTASSIUM 3.2* 3.5 3.5 3.8   < > 3.8 3.8   CHLORIDE 112* 113* 113* 112*   < > 105 105   CO2 23 26 24 24   < > 24 27   ANIONGAP 9 6 8 7   < > 11 6   GLC 93 94 100* 107*   < > 176* 207*   BUN 11 8 7 9   < > 18 19   CR 0.76 0.81 0.83 0.81   < > 1.00 0.91   GFRESTIMATED 81 74 73 74   < > 58* 65   GFRESTBLACK >90 86 84 86   < > 67 75   EMMA 8.0* 8.3* 8.0* 8.6   < > 8.6 8.7   MAG  --  1.8  --   --   --  2.1  --    PHOS  --  3.4  --   --   --  3.9  --    PROTTOTAL  --   --   --   --   --  6.8 6.9   ALBUMIN  --   --   --   --   --  3.5 3.6   BILITOTAL  --   --   --   --   --  0.7 0.6   ALKPHOS  --   --   --   --   --  76 83   AST  --   --   --   --   --  19 19   ALT  --   --   --   --   --  42 47    < > = values in this interval not  displayed.       LFTs   Recent Labs   Lab 09/20/19  1432   PROTTOTAL 6.8   ALBUMIN 3.5   BILITOTAL 0.7   ALKPHOS 76   AST 19   ALT 42       Coagulation Studies   Recent Labs   Lab 09/25/19  1041   INR 1.05   PTT 30       Attending Addendum:  The patient has been seen and evaluated by me, and I have reviewed today's vital signs, medications, labs, and imaging results independently. I have discussed the patient and plan with the team, and agree with the findings and plan outlined in this note. Key aspects of my evaluation, impression, and plan are as follows.     Ms. Negro is a 69 year old woman who was noted to have new intracranial masses on surveillance MRI. Has history of DLBCL of the cervix in 2007 and then CNS relapse in 2014 that had complete response to MT-R. Was seen in clinic with non-specific symtoms and no focal neuro deficits recently. Had a surveillance MRI that showed new masses concerning for recurrence. Admitted for expedited workup and management.     Interviewed with telephone . Had a fall overnight that was unwitnessed, no trauma.      VS reviewed.  Labs and imaging reviewed.     Presentation concerning for relapsed CNS lymphoma.   LP completed with negative flow.  Need for biopsy has been extensively discussed with patient and she declines.   Start DXM 40 mg daily x 5 days, Rituxan weekly (first dose today).   Rad Onc to set up XRT as outpt.   Discharge tomorrow.      Annalise Roa MD   of Medicine  Hematology, Oncology and Transplantation   Pager: [phone]

## 2019-09-26 NOTE — PLAN OF CARE
[id number]: VSS. Afebrile. Pt complained of generalized abdominal pain. Denied nausea.Tylenol given. Voiding adequately in commode w/ SBA. BA on. Per providers note, pt has continued to refuse brain biopsy and will get Rituxan weekly starting tomorrow. Plan for discharge on Friday. Continue POC.

## 2019-09-26 NOTE — PLAN OF CARE
VSS on room air, afebrile. Up with SBA, bed alarm on. Forgetful. Eldest son Martha here to discuss brain biopsy and pt wishes. Martha stated that Simran continues to decline biopsy and wants to continue with a chemo if possible. Martha would like to be on speaker phone tomorrow when the MDs round to hear the plan and options. Pt incontinent of urine x2. Ate 25% of dinner. On calorie counts.

## 2019-09-27 ENCOUNTER — OFFICE VISIT (OUTPATIENT)
Dept: INTERPRETER SERVICES | Facility: CLINIC | Age: 69
End: 2019-09-27
Payer: COMMERCIAL

## 2019-09-27 ENCOUNTER — APPOINTMENT (OUTPATIENT)
Dept: RADIATION ONCOLOGY | Facility: CLINIC | Age: 69
End: 2019-09-27
Attending: RADIOLOGY
Payer: COMMERCIAL

## 2019-09-27 VITALS
DIASTOLIC BLOOD PRESSURE: 65 MMHG | TEMPERATURE: 97 F | HEART RATE: 74 BPM | RESPIRATION RATE: 18 BRPM | BODY MASS INDEX: 26.4 KG/M2 | HEIGHT: 59 IN | SYSTOLIC BLOOD PRESSURE: 119 MMHG | OXYGEN SATURATION: 97 % | WEIGHT: 130.95 LBS

## 2019-09-27 LAB
ANION GAP SERPL CALCULATED.3IONS-SCNC: 8 MMOL/L (ref 3–14)
BASOPHILS # BLD AUTO: 0 10E9/L (ref 0–0.2)
BASOPHILS NFR BLD AUTO: 0 %
BUN SERPL-MCNC: 16 MG/DL (ref 7–30)
CALCIUM SERPL-MCNC: 8.4 MG/DL (ref 8.5–10.1)
CHLORIDE SERPL-SCNC: 113 MMOL/L (ref 94–109)
CO2 SERPL-SCNC: 20 MMOL/L (ref 20–32)
CREAT SERPL-MCNC: 0.74 MG/DL (ref 0.52–1.04)
DIFFERENTIAL METHOD BLD: ABNORMAL
EOSINOPHIL # BLD AUTO: 0 10E9/L (ref 0–0.7)
EOSINOPHIL NFR BLD AUTO: 0 %
ERYTHROCYTE [DISTWIDTH] IN BLOOD BY AUTOMATED COUNT: 12.8 % (ref 10–15)
GFR SERPL CREATININE-BSD FRML MDRD: 83 ML/MIN/{1.73_M2}
GLUCOSE SERPL-MCNC: 180 MG/DL (ref 70–99)
HCT VFR BLD AUTO: 34.7 % (ref 35–47)
HGB BLD-MCNC: 11.2 G/DL (ref 11.7–15.7)
IMM GRANULOCYTES # BLD: 0 10E9/L (ref 0–0.4)
IMM GRANULOCYTES NFR BLD: 0.2 %
LYMPHOCYTES # BLD AUTO: 0.6 10E9/L (ref 0.8–5.3)
LYMPHOCYTES NFR BLD AUTO: 10.7 %
MCH RBC QN AUTO: 27.3 PG (ref 26.5–33)
MCHC RBC AUTO-ENTMCNC: 32.3 G/DL (ref 31.5–36.5)
MCV RBC AUTO: 85 FL (ref 78–100)
MONOCYTES # BLD AUTO: 0.1 10E9/L (ref 0–1.3)
MONOCYTES NFR BLD AUTO: 1 %
NEUTROPHILS # BLD AUTO: 4.5 10E9/L (ref 1.6–8.3)
NEUTROPHILS NFR BLD AUTO: 88.1 %
NRBC # BLD AUTO: 0 10*3/UL
NRBC BLD AUTO-RTO: 0 /100
PLATELET # BLD AUTO: 167 10E9/L (ref 150–450)
POTASSIUM SERPL-SCNC: 3.6 MMOL/L (ref 3.4–5.3)
RBC # BLD AUTO: 4.1 10E12/L (ref 3.8–5.2)
SODIUM SERPL-SCNC: 142 MMOL/L (ref 133–144)
SPECIMEN SOURCE: NORMAL
T GONDII DNA SPEC QL NAA+PROBE: NOT DETECTED
WBC # BLD AUTO: 5.1 10E9/L (ref 4–11)

## 2019-09-27 PROCEDURE — 80048 BASIC METABOLIC PNL TOTAL CA: CPT | Performed by: PHYSICIAN ASSISTANT

## 2019-09-27 PROCEDURE — 36415 COLL VENOUS BLD VENIPUNCTURE: CPT | Performed by: PHYSICIAN ASSISTANT

## 2019-09-27 PROCEDURE — T1013 SIGN LANG/ORAL INTERPRETER: HCPCS | Mod: U3

## 2019-09-27 PROCEDURE — 25800030 ZZH RX IP 258 OP 636: Performed by: PHYSICIAN ASSISTANT

## 2019-09-27 PROCEDURE — 77307 TELETHX ISODOSE PLAN CPLX: CPT | Performed by: RADIOLOGY

## 2019-09-27 PROCEDURE — 77290 THER RAD SIMULAJ FIELD CPLX: CPT | Performed by: RADIOLOGY

## 2019-09-27 PROCEDURE — 77334 RADIATION TREATMENT AID(S): CPT | Performed by: RADIOLOGY

## 2019-09-27 PROCEDURE — 85025 COMPLETE CBC W/AUTO DIFF WBC: CPT | Performed by: PHYSICIAN ASSISTANT

## 2019-09-27 PROCEDURE — 25000131 ZZH RX MED GY IP 250 OP 636 PS 637: Performed by: PHYSICIAN ASSISTANT

## 2019-09-27 PROCEDURE — 25000132 ZZH RX MED GY IP 250 OP 250 PS 637: Performed by: INTERNAL MEDICINE

## 2019-09-27 PROCEDURE — 25000132 ZZH RX MED GY IP 250 OP 250 PS 637: Performed by: PHYSICIAN ASSISTANT

## 2019-09-27 PROCEDURE — 25000128 H RX IP 250 OP 636: Performed by: PHYSICIAN ASSISTANT

## 2019-09-27 RX ORDER — ALLOPURINOL 300 MG/1
300 TABLET ORAL DAILY
Qty: 30 TABLET | Refills: 0 | Status: SHIPPED | OUTPATIENT
Start: 2019-09-28 | End: 2021-02-10

## 2019-09-27 RX ORDER — ZOLPIDEM TARTRATE 5 MG/1
5 TABLET ORAL
COMMUNITY
Start: 2019-09-27 | End: 2021-02-10

## 2019-09-27 RX ORDER — AMOXICILLIN 250 MG
1 CAPSULE ORAL 2 TIMES DAILY PRN
Qty: 60 TABLET | Refills: 0 | Status: SHIPPED | OUTPATIENT
Start: 2019-09-27 | End: 2021-02-10

## 2019-09-27 RX ORDER — DEXAMETHASONE 4 MG/1
10 TABLET ORAL 4 TIMES DAILY
Qty: 30 TABLET | Refills: 0 | Status: SHIPPED | OUTPATIENT
Start: 2019-09-27 | End: 2019-10-14

## 2019-09-27 RX ORDER — POLYETHYLENE GLYCOL 3350 17 G/17G
17 POWDER, FOR SOLUTION ORAL DAILY
Qty: 30 PACKET | Refills: 0 | Status: SHIPPED | OUTPATIENT
Start: 2019-09-27 | End: 2021-02-10

## 2019-09-27 RX ORDER — FLUTICASONE PROPIONATE 50 MCG
2 SPRAY, SUSPENSION (ML) NASAL DAILY PRN
COMMUNITY
Start: 2019-09-27 | End: 2021-02-10

## 2019-09-27 RX ORDER — CETIRIZINE HYDROCHLORIDE 10 MG/1
10 TABLET ORAL DAILY PRN
COMMUNITY
Start: 2019-09-27 | End: 2021-02-10

## 2019-09-27 RX ORDER — SIMETHICONE 80 MG
80 TABLET,CHEWABLE ORAL EVERY 6 HOURS PRN
Qty: 30 TABLET | Refills: 0 | Status: SHIPPED | OUTPATIENT
Start: 2019-09-27 | End: 2021-02-10

## 2019-09-27 RX ORDER — ASPIRIN 81 MG/1
81 TABLET ORAL DAILY
COMMUNITY
Start: 2019-09-27 | End: 2021-02-10

## 2019-09-27 RX ADMIN — CETIRIZINE HYDROCHLORIDE 10 MG: 5 TABLET ORAL at 09:09

## 2019-09-27 RX ADMIN — OMEPRAZOLE 20 MG: 20 CAPSULE, DELAYED RELEASE ORAL at 09:09

## 2019-09-27 RX ADMIN — MULTIPLE VITAMINS W/ MINERALS TAB 1 TABLET: TAB at 09:09

## 2019-09-27 RX ADMIN — DEXAMETHASONE 40 MG: 4 TABLET ORAL at 09:09

## 2019-09-27 RX ADMIN — ONDANSETRON 8 MG: 2 INJECTION INTRAMUSCULAR; INTRAVENOUS at 09:17

## 2019-09-27 RX ADMIN — SIMETHICONE CHEW TAB 80 MG 80 MG: 80 TABLET ORAL at 02:38

## 2019-09-27 RX ADMIN — ALLOPURINOL 300 MG: 300 TABLET ORAL at 09:07

## 2019-09-27 RX ADMIN — SODIUM CHLORIDE, PRESERVATIVE FREE: 5 INJECTION INTRAVENOUS at 02:40

## 2019-09-27 ASSESSMENT — ACTIVITIES OF DAILY LIVING (ADL)
ADLS_ACUITY_SCORE: 15.5

## 2019-09-27 ASSESSMENT — MIFFLIN-ST. JEOR: SCORE: 1034.63

## 2019-09-27 NOTE — PROGRESS NOTES
Pt  Is eager to be able to go home.D/t the fall patient has sitter d/t urgency .PPatient reports gi upset  Is greatly relieved with simehicone.Good UOP.Continue to monitor

## 2019-09-27 NOTE — PROGRESS NOTES
Per Mobility Level Guideline;  Bed/chair alarm Yes.  Patient may ambulate assist of 1  Patient requires the following assistive equipment: walker   PT/OT consult orders in place No

## 2019-09-27 NOTE — PROGRESS NOTES
DISCHARGE                        9/27/19 1335  ----------------------------------------------------------------------------  Discharged to: Home  Via: Automobile  Accompanied by: Family (Son)  Discharge Instructions: diet, activity, medications, follow up appointments, when to call the MD, aftercare instructions reviewed with son  Prescriptions: To be filled by [facility] pharmacy per pt's and son's request; medication list reviewed & sent with pt and son  Follow Up Appointments: arranged; information given  Belongings: All sent with pt  IV: out  Telemetry: off  Pt exhibits understanding of above discharge instructions; all questions answered.    Discharge Paperwork: Signed, copied, and sent home with patient and son

## 2019-09-27 NOTE — PLAN OF CARE
Nursing Focus: Chemotherapy  D: Positive blood return via L PIV. Insertion site is clean/dry/intact, dressing intact with no complaints of pain.  Urine output is recorded in intake in Doc Flowsheet.      I: Premedications given per order (see electronic medical administration record). Dose #1 of Rituxan started at 50ml/hr. Pt L PIV infiltrated at 150mL/hr. Rituxan on hold for about one hour while pt awaited new PIV. Started again at 150mL/ hr and pt diastolic /40s. Continued at 150mL/hr for one hour. Next set of vitals good. Pt was able to get up to 250ml/hr in R PIV. Reviewed pt teaching on chemotherapy side effects.  Pt denies need for further teaching. Chemotherapy double checked per protocol by two chemotherapy competent RN's.     A: Tolerating procedure well. Denies nausea and or pain.     P: Continue to monitor urine output and symptoms of nausea. Screen for symptoms of toxicity.

## 2019-09-27 NOTE — PLAN OF CARE
/40s. OVSS. Afebrile. Pt received Rituxan this yesika. See chemo note. L PIV infiltrated during Rituxan. Area outlined. And PIV removed. Pt refused cold compress. New PIV placed in R arm. K recheck 3.5. No additional replacements needed. Pt c/o abd cramping. Simethicone given w/ relief. Pt anxious for discharge tomorrow. Attendant at bedside and BA in place. Continue POC.     Per Mobility Level Guideline;  Bed/chair alarm Yes  Patient may ambulate SBA  Patient requires the following assistive equipment: Walker  PT/OT consult orders in place No

## 2019-09-27 NOTE — PLAN OF CARE
SLP: Attempted to see pt for dysphagia tx, however pt out of room at radiation. Pt denies any concerns re: pts swallowing on current diet level. Per MD note, pt may discharge today at 1PM. Will follow up as appropriate, pending discharge plans.

## 2019-09-27 NOTE — PROGRESS NOTES
Calorie Count  Intake recorded for: 9/26  Total Kcals: 1096 Total Protein: 46g  Kcals from Hospital Food: 1096  Protein: 46g  Kcals from Outside Food (average):0 Protein: 0g  # Meals Recorded: 2 meals (First - 75% cream of wheat w/ milk & brown sugar, scrambled eggs, banana, apple juice)      (Second - 75% garlic desiree stir ramirez w/ chicken, veggies & brown rice, brownie, apple juice)  # Supplements Recorded: 0

## 2019-09-27 NOTE — PLAN OF CARE
Speech Language Therapy Discharge Summary    Reason for therapy discharge:    All goals and outcomes met, no further needs identified.Discharged today to home with family.    Progress towards therapy goal(s). See goals on Care Plan in [facility] electronic health record for goal details.  Goals met    Therapy recommendation(s):    No further therapy is recommended. Pt tolerating regular diet and thin liquids. Pt to follow up with PCP in search of OP SLP evaluation if warranted.         Last treatment session at time of discharge - recommend pt advance to regular textures and thin liquids with supervision. Caregivers to encourage upright positioning for all PO intake, slow pacing, small single sips/bites, and alternating between consistencies.

## 2019-09-27 NOTE — DISCHARGE SUMMARY
Discharge Summary  Hematology / Oncology    Date of Admission:  9/20/2019  Date of Discharge:  09/27/2019  Discharging Provider: Mariposa Padilla  Date of Service (when I saw the patient): 09/27/2019    Discharge Diagnoses   Principal Problem:    Brain lesion  Active Problems:    CNS lymphoma (H)    Brain mass    History of Present Illness   Simran Negro is a 69 year old Hmong speaking female with a history of DLBCL with CNS relapse who achieved CR in 2014. A routine brain MRI on 9/19/19 revealed two new b/l frontal lobe lesions concerning for recurrent lymphoma. She was admitted for further workup and management. CT CAP completed, no e/o distant sites of disease. PET scan confirms brain lesions as only sites of disease. She underwent LP in attempt to diagnose the 2 brain lesions, unfortunately flow was negative. NSG was consulted to consideration of a brain biopsy, however, after extensive discussion the patient declined the procedure. Preferred to pursue modified treatment plan and whole brain radiation. She was started on C1D1 Rituxan with 5 days of dexamethasone 40mg daily on 9/26. PIV extravasated during infusion, but otherwise patient tolerated without complication. Planning on weekly Rituxan with next dose scheduled for Friday October 4th with provider appt beforehand. Due to poor tolerance of multiple pills at one time, dexamethasone dosing was changed to 10mg four times daily. Reviewed plan for steroids and follow up with patient in presence of professional .    Radiation Oncology was also consulted during admission. They are willing to offer whole brain radiation without a tissue diagnosis. Simulation was completed prior to discharge on 9/27. Patient left with printed out list of next radiation appts.     Of note, patient brought significant amount of money with her to the hospital (unclear the exact amount $500-1000). She declined having her money taken to security. Over the next few days she  "reported money missing (see RNs notes for further details). Patient may discuss with patient relations if she wishes.    Hospital Course   Simran Negro was admitted on 9/20/2019.  The following problems were addressed during her hospitalization:    #h/o DLBCL with CNS relapse s/p MT-R with CR 2014  #New frontal lobe masses  Follows with Dr. Dailey. History per most recent progress note, \"Diagnosed with DLBCL of the cervix in 06/2007 and was treated with 6 cycles of R-CHOP. She attained a CR, but in early 2014 developed difficulty concentrating, gait instability, and right-sided headaches with some blurry vision. Workup revealed an intra-axial right parietal mass. Stereotactic biopsy on 03/19/2014 which was diagnostic for DLBCL, non-germinal center type  Immunophenotypically this was similar to her previous diffuse large B-cell lymphoma, and this was presumed CNS relapse. She was started on MT-R (methotrexate, temozolomide and rituximab) therapy in 03/2014. Her course was complicated by line-related DVT, and she completed a course of enoxaparin for this. She attained a complete clinical and radiographic response after completing the MT-R treatment regimen and EA consolidation in 8/2014.\" A routine brain MRI on 9/19/19 revealed two new b/l frontal lobe lesions, left sided lesion is 4.2x3.2x3.9cm in size and right sided lesion is 1.9x2.7x3.3cm, concerning for recurrent lymphoma. Reports fatigue and poor appetite as most significant symptoms over the last month. Sons endorse she has been forgetful with slurred speech and unsteadiness when walking.  - Neurosurgery consult for biopsy consideration, recs appreciated. Prefer to pursue LP as initial step in diagnosis. Given negative CSF flow, NSG recommending brain biopsy, however, patient declined (see below).   - Consulted Med Procedure team, completed LP 9/23. CSF flow negative. Cryptococcus, Toxoplasma negative. Continue to follow Cysticercosis (low suspicion for active " infection).  - Hold off on steroids unless clinically unstable (e.g. focal neurologic symptoms, seizures)  - PET scan completed on 9/24, brain is only site of disease. 9/21 CT c/a/p showed no evidence of lymphoma    - Discussed extensively with patient, and her son Jeffery (via phone) with  present on 9/25. Discussed that biopsy is the best way to figure out diagnosis so that we can best treat whatever process is going on in her brain. With out a diagnosis treatment would be limited (if even offered at all), and would likely not be as effective as her last chemo cycle was which allowed for nearly 5 years without disease. Patient expressed a lot of doubt as to what was the best way to proceed. He son urged her to make a decision, stating he would not make he decision for her. Ultimately, Simran decided to try to pursue treatment options without biopsy. Discussed with Dr. Dailey will offer her dexamethasone 40mg daily x 5 days with weekly rituximab (first dose to be given 9/26). Will also plan to start radiation as outpatient, simulation completed prior to discharge.      #Dysphagia  - SLP consult, appreciate assistance  - Diet advanced to regular diet with thin liquids     #TLS, low risk currently  Uric acid 8.0 on admission. No other evidence of TLS. No e/o DIC on admission. Improved to 3.4 on 9/24. No acute issues during hospitalization.  - Continue allopurinol 300 mg daily.      #GERD  - Continue PTA Omeprazole and Ranitidine     #Insomnia  - Continue PTA Ambien prn     #Moderate malnutrition  Present on admission. Reports fatigue and poor appetite over the last month. Has lost about 15 lbs.  - Encourage small, frequent meals  - Supplements between meals     Dispo: Plan to discharge to home 9/27  Follow up: Plan for provider appt prior to next week's rituximab. Discussed plan for radiation with Rad Onc, simulation completed on 9/27. Patient left with print out sheet of radiation appointments.    Mariposa  NACHO Padilla PA-C  Hematology/Oncology  Pager: [phone]    Code Status   DNR / DNI    Primary Care Physician   SAM ROBLERO    Physical Exam   Vital Signs with Ranges  Temp:  [95.2  F (35.1  C)-98.9  F (37.2  C)] 97  F (36.1  C)  Pulse:  [54-85] 74  Resp:  [15-18] 18  BP: (106-136)/(44-77) 119/65  SpO2:  [94 %-99 %] 97 %  130 lbs 15.25 oz    Discharge Disposition   Discharged to home  Condition at discharge: Satisfactory    Consultations This Hospital Stay   NEUROSURGERY ADULT IP CONSULT  VASCULAR ACCESS CARE ADULT IP CONSULT  INTERNAL MEDICINE PROCEDURE TEAM ADULT IP CONSULT [facility] - LUMBAR PUNCTURE  SWALLOW EVAL SPEECH PATH AT BEDSIDE IP CONSULT  RADIATION ONCOLOGY IP CONSULT  INTERNAL MEDICINE PROCEDURE TEAM ADULT IP CONSULT [facility] - LUMBAR PUNCTURE  SOCIAL WORK IP CONSULT  VASCULAR ACCESS CARE ADULT IP CONSULT  VASCULAR ACCESS CARE ADULT IP CONSULT    Discharge Orders      Reason for your hospital stay    You were in the hospital because of the brain imaging in clinic that showed 2 new brain masses that were concerning for lymphoma. You were started on treatment with steroids (dexamethasone) and rituximab to hopefully shrink these masses in the brain.     Follow Up and recommended labs and tests    Scheduled appointments are listed below. Please note your next rituximab is scheduled for Friday, October 4.     Activity    Your activity upon discharge: activity as tolerated     When to contact your care team    Please call the [facility] at [phone] (option 6 for Hematology/Oncology, then option 4 for clinical questions) if you develop temperature above 100.4, shortness of breath, chest pain, headaches, vision changes, bleeding, uncontrolled nausea, vomiting, diarrhea, or pain.     Diet    Follow this diet upon discharge: Regular     Discharge Medications   Discharge Medication List as of 9/27/2019  1:31 PM      START taking these medications    Details    allopurinol (ZYLOPRIM) 300 MG tablet Take 1 tablet (300 mg) by mouth daily, Disp-30 tablet, R-0, E-Prescribe      dexamethasone (DECADRON) 4 MG tablet Take 2.5 tablets (10 mg) by mouth 4 times daily on 9/28, 9/29, and 9/30, Disp-30 tablet, R-0, E-Prescribe      polyethylene glycol (MIRALAX/GLYCOLAX) packet Take 17 g by mouth daily Hold for loose stools, Disp-30 packet, R-0, E-Prescribe      senna-docusate (SENOKOT-S/PERICOLACE) 8.6-50 MG tablet Take 1 tablet by mouth 2 times daily as needed for constipation, Disp-60 tablet, R-0, E-Prescribe      simethicone (MYLICON) 80 MG chewable tablet Take 1 tablet (80 mg) by mouth every 6 hours as needed for cramping, Disp-30 tablet, R-0, E-Prescribe         CONTINUE these medications which have CHANGED    Details   acetaminophen-codeine (TYLENOL #3) 300-30 MG tablet Take 1-2 tablets by mouth every 4 hours as needed for severe pain, Historical      aspirin 81 MG EC tablet Take 1 tablet (81 mg) by mouth daily, Historical      calcium carbonate-vitamin D (OYSTER SHELL CALCIUM/D) 500-200 MG-UNIT tablet Take 1 tablet by mouth 2 times daily, Historical      cetirizine (ZYRTEC) 10 MG tablet Take 1 tablet (10 mg) by mouth daily as needed for allergies, Historical      fluticasone (FLONASE) 50 MCG/ACT nasal spray Spray 2 sprays in nostril daily as needed for rhinitis or allergies, Historical      omeprazole (PRILOSEC) 20 MG DR capsule Take 1 capsule (20 mg) by mouth daily, Historical      ranitidine (ZANTAC) 150 MG tablet Take 1 tablet (150 mg) by mouth 2 times daily, Historical      zolpidem (AMBIEN) 5 MG tablet Take 1 tablet (5 mg) by mouth nightly as needed for sleep, Historical         CONTINUE these medications which have NOT CHANGED    Details   Acetaminophen (TYLENOL PO) Take 325 mg by mouth, Historical      ammonium lactate (AMLACTIN) 12 % cream Apply two to three times a day as needed.Historical      CYANOCOBALAMIN PO Take 500 mcg by mouth daily, Historical      magnesium  gluconate (MAGONATE) 500 MG tablet Take 500 mg by mouth, Historical      Multiple Vitamin (MULTI-VITAMINS) TABS Historical      ORDER FOR DME Please dispense to take 3 Ensure/day.Disp-90 Units, R-3, Normal         STOP taking these medications       Skin Protectants, Misc. (CRITIC-AID CLEAR) OINT Comments:   Reason for Stopping:             Allergies   Allergies   Allergen Reactions     Acetaminophen-Codeine Nausea and Vomiting     Patient states she is not allergic to this Tylenol w/codeine  Has taken with food and tolerated       Data   Most Recent 3 CBC's:  Recent Labs   Lab Test 09/27/19  0619 09/26/19  1606 09/26/19  0522   WBC 5.1 5.6 5.5   HGB 11.2* 11.3* 10.8*   MCV 85 86 86    181 172      Most Recent 3 BMP's:  Recent Labs   Lab Test 09/27/19  0619 09/26/19  1606 09/26/19  0522    144 145*   POTASSIUM 3.6 3.5 3.2*   CHLORIDE 113* 114* 112*   CO2 20 24 23   BUN 16 9 11   CR 0.74 0.85 0.76   ANIONGAP 8 6 9   EMMA 8.4* 8.1* 8.0*   * 99 93     Most Recent 2 LFT's:  Recent Labs   Lab Test 09/26/19  1606 09/20/19  1432   AST 25 19   ALT 30 42   ALKPHOS 68 76   BILITOTAL 0.6 0.7     Most Recent INR's and Anticoagulation Dosing History:  Anticoagulation Dose History     Recent Dosing and Labs Latest Ref Rng & Units 4/25/2014 6/3/2014 8/27/2014 8/28/2014 9/14/2016 9/20/2019 9/25/2019    INR 0.86 - 1.14 1.09 1.01 1.22(H) 1.22(H) 1.03 1.00 1.05        Most Recent 3 Troponin's:  Recent Labs   Lab Test 06/17/14  1942 06/17/14  1130   TROPI 0.022 0.020     Most Recent Cholesterol Panel:No lab results found.  Most Recent 6 Bacteria Isolates From Any Culture (See EPIC Reports for Culture Details):  Recent Labs   Lab Test 09/23/19  1545 09/01/14  0647 09/01/14  0642 08/31/14  0530 08/30/14  0500 08/30/14  0455   CULT Culture negative after 4 days  Culture negative monitoring continues No growth No growth No growth  No growth No growth No growth     Most Recent TSH, T4 and A1c Labs:  Recent Labs   Lab  Test 08/27/14  0627   A1C 5.6     Results for orders placed or performed during the hospital encounter of 09/20/19   PET Oncology Whole Body    Narrative    Combined Report of:    PET and CT on  9/24/2019 3:58 PM :    1. PET of the neck, chest, abdomen, and pelvis.  2. PET CT Fusion for Attenuation Correction and Anatomical  Localization:    3. Diagnostic CT scan of the chest, abdomen, and pelvis with  intravenous contrast for interpretation.  3. CT of the chest, abdomen and pelvis obtained for diagnostic  interpretation.  4. 3D MIP and PET-CT fused images were processed on an independent  workstation and archived to PACS and reviewed by a radiologist.    Technique:    1. PET: The patient received 10.17 mCi of F-18-FDG; the serum glucose  was 98 prior to administration, body weight was 59 kg. Images were  evaluated in the axial, sagittal, and coronal planes as well as the  rotational whole body MIP. Images were acquired from the Vertex to the  Feet.    UPTAKE WAS MEASURED AT 60 MINUTES.     BACKGROUND:  Liver SUV max= 3.9,   Aorta Blood SUV Max: 3.1.     2. CT: Volumetric acquisition for clinical interpretation of the  chest, abdomen, and pelvis acquired at 3 mm sections . The chest,  abdomen, and pelvis were evaluated at 5 mm sections in bone, soft  tissue, and lung windows.      The patient received 80 cc. Of Isovue 370 intravenously for the  examination.    --    3. 3D MIP and PET-CT fused images were processed on an independent  workstation and archived to PACS and reviewed by a radiologist.    INDICATION: Diffuse large B-cell lymphoma, stage III/IV, re-staging;  Primary CNS lymphoma, staging; Relapsed DLBCL with brain involvement.  Eval for body involvement.    ADDITIONAL INFORMATION OBTAINED FROM EMR: Initially diagnosed with  B-cell lymphoma of the cervix May 2007. With chemotherapy. Early 2014  developed parietal metastasis. Additional chemotherapy at that time.  Routine brain MRI 9/19/2019 showed 2 new  bifrontal lesions. Plan for  neurosurgery is for surgical removal.    COMPARISON: CT chest, abdomen, pelvis 9/21/2019, MR brain 9/19/2019,  whole body PET 9/11/2007    FINDINGS:     HEAD/NECK:  Previously mentioned bifrontal masses display avid FDG uptake with a  maximum SUV of 26 on the left frontal lesion and 22.7 in the right  frontal lesion. The lesions are grossly unchanged from prior MRI  measuring 3.7 x 4.3 cm (previously 3.8 x 4.3) and the left and 3.4 x  2.4 cm (previously 3.4 x 2.5) on the right.    No suspicious FDG uptake in the neck.    Bifrontal hyperdense masses with surrounding vasogenic edema not  significantly changed from previous MRI. There is continued mass  effect on the lateral ventricles right greater than left. There is no  hydrocephalus. No definite evidence for new intracranial hemorrhage.  Paranasal sinuses and mastoid air cells are clear. The mucosal  pharyngeal space, , prevertebral and carotid spaces are  within normal limits. Small 0.5 cm hypodensity with focus of  calcification in the anterior left lobe of the thyroid.    Small 1.2 x 0.9 cm soft tissue focus in the posterior right parotid  gland which did not show enhancement on previous MRI, and is likely  benign.    CHEST:  There is no suspicious FDG uptake in the chest.     Central tracheobronchial tree is clear. Mild dependent atelectasis. No  pleural effusion. No pneumothorax. There is a 0.7 cm perifissural  solid pulmonary nodule in the inferior right upper lobe (series 8  image 62) which similarly measures 0.7 cm on 9/11/2007 PET/CT.    The heart is not enlarged. No large pericardial effusion. Ascending  aorta and main pulmonary artery are normal caliber. No large central  pulmonary embolism. No mediastinal, hilar, or axillary  lymphadenopathy.    ABDOMEN AND PELVIS:  There is no suspicious FDG uptake in the abdomen or pelvis.    Mild hepatic steatosis. No suspicious focal hepatic lesions.  Cholecystectomy. Pancreas  is unremarkable. Spleen is unremarkable.  Adrenal glands are unremarkable. Somewhat lobulated appearance of the  kidneys with bilateral simple cysts in addition to smaller  subcentimeter hypodensities too small to accurately characterize. No  hydronephrosis. Small 3 mm renal stone at the left mid kidney and 2 mm  stone at the inferior pole of the left kidney. Caicedo catheter is in  place with a moderate amount of air in the bladder. No pelvic masses.  Small and large bowel are normal caliber. No intraabdominal free air  or fluid.    LOWER EXTREMITIES:   No abnormal masses or hypermetabolic lesions.    BONES:   There are no suspicious lytic or blastic osseous lesions.  There is no  abnormal FDG uptake in the skeleton. Mild degenerative changes of the  thoracolumbar spine.        Impression    IMPRESSION:   In this patient with history of diffuse large B-cell lymphoma:  1. Previously mentioned bifrontal brain masses display avid FDG uptake  indicating recurrent lymphoma.  2. No FDG avid lesions elsewhere within the body to suggest metastatic  disease in the neck, chest, abdomen, pelvis, or lower extremities.    I have personally reviewed the examination and initial interpretation  and I agree with the findings.    SYBIL VICENTE MD   CT Chest/Abdomen/Pelvis w Contrast    Narrative    EXAMINATION: CT CHEST/ABDOMEN/PELVIS W CONTRAST, 9/21/2019 1:33 PM    TECHNIQUE:  Helical CT images from the lung apices through the  symphysis pubis were obtained with contrast.  Coronal reformatted  images were generated at a workstation for further assessment.    CONTRAST:  78 cc Isovue 370 IV.    COMPARISON: 8/27/2014.    HISTORY: New brain lesions concerning for relapse of lymphoma (treated  in 2014 for relapsed CNS lymphoma). Further staging for disease  burden.    FINDINGS:  Chest:   The heart is enlarged. Normal great vessels. No mediastinal  lymphadenopathy by size criteria. The central airways are patent.  Atelectasis in the  posterior lower lobes. Interlobular septal  thickening with an AP gradient.    Abdomen and pelvis:   Liver: Hepatic steatosis. No suspicious liver lesions. Portal veins  appear patent.  Gallbladder: Cholecystectomy.  Spleen: Normal size.  Pancreas: No suspicious pancreatic lesions. The pancreatic duct is not  dilated.  Adrenal glands: No adrenal nodules.  Kidneys: No hydronephrosis. Tiny nonobstructing stone in the inferior  pole of left kidney.  No suspicious masses. Simple cysts bilaterally.  Bladder / Pelvic organs: There is a 1.5 cm simple cyst in the left  ovary. Unremarkable uterus. Unremarkable bladder.  Bowel: No bowel wall thickening. The appendix is unremarkable. Normal  diameter of the small bowel and colon  Lymph nodes: No retroperitoneal, mesenteric, or pelvic  lymphadenopathy.  Fluid: No free fluid within the abdomen.  Vessels: No infrarenal aortic aneurysm.     Bones and soft tissues: No suspicious osseous lesions.      Impression    IMPRESSION:     1. In this patient with history of treated lymphoma and evidence of  CNS relapse, there is no evidence of lymphoma in the chest, abdomen,  or pelvis.  2. Mild interstitial pulmonary edema.    I have personally reviewed the examination and initial interpretation  and I agree with the findings.    MINAL NAPIER MD       Physician Attestation   I, Annalise Roa MD, personally saw and evaluated Simran Negro as part of a shared visit.  I have reviewed and discussed with the advanced practice provider their discharge plan.    My key history or physical exam findings from the day of discharge:   70 yo F with likely relapsed secondary CNS lymphoma. Declined biopsy. Tx Rituxan weekly x 4, DXM x 5 days, and Radiation. Set up for OP radiation and Rituxan.     >30 minutes spent on hospital discharge coordination.       Annalise Roa  Date of Service (when I saw the patient): 09/27/19

## 2019-09-28 LAB
BACTERIA SPEC CULT: NO GROWTH
Lab: NORMAL
SPECIMEN SOURCE: NORMAL

## 2019-09-29 LAB — T SOL LAR IGG CSF-ACNC: NORMAL

## 2019-09-30 DIAGNOSIS — C83.390 CNS LYMPHOMA: Primary | ICD-10-CM

## 2019-09-30 LAB
EBV DNA # SPEC NAA+PROBE: <500 {COPIES}/ML
EBV DNA SPEC NAA+PROBE-LOG#: <2.7 {LOG_COPIES}/ML

## 2019-10-01 ENCOUNTER — APPOINTMENT (OUTPATIENT)
Dept: RADIATION ONCOLOGY | Facility: CLINIC | Age: 69
End: 2019-10-01
Attending: RADIOLOGY
Payer: COMMERCIAL

## 2019-10-01 PROCEDURE — 77412 RADIATION TX DELIVERY LVL 3: CPT | Performed by: RADIOLOGY

## 2019-10-01 PROCEDURE — 77280 THER RAD SIMULAJ FIELD SMPL: CPT | Performed by: RADIOLOGY

## 2019-10-02 ENCOUNTER — APPOINTMENT (OUTPATIENT)
Dept: RADIATION ONCOLOGY | Facility: CLINIC | Age: 69
End: 2019-10-02
Attending: RADIOLOGY
Payer: COMMERCIAL

## 2019-10-02 PROCEDURE — 77412 RADIATION TX DELIVERY LVL 3: CPT | Performed by: RADIOLOGY

## 2019-10-03 ENCOUNTER — OFFICE VISIT (OUTPATIENT)
Dept: INTERPRETER SERVICES | Facility: CLINIC | Age: 69
End: 2019-10-03
Payer: COMMERCIAL

## 2019-10-03 ENCOUNTER — APPOINTMENT (OUTPATIENT)
Dept: RADIATION ONCOLOGY | Facility: CLINIC | Age: 69
End: 2019-10-03
Attending: RADIOLOGY
Payer: COMMERCIAL

## 2019-10-03 PROCEDURE — T1013 SIGN LANG/ORAL INTERPRETER: HCPCS | Mod: U3

## 2019-10-03 PROCEDURE — 77412 RADIATION TX DELIVERY LVL 3: CPT | Performed by: RADIOLOGY

## 2019-10-04 ENCOUNTER — INFUSION THERAPY VISIT (OUTPATIENT)
Dept: ONCOLOGY | Facility: CLINIC | Age: 69
End: 2019-10-04
Attending: INTERNAL MEDICINE
Payer: COMMERCIAL

## 2019-10-04 ENCOUNTER — APPOINTMENT (OUTPATIENT)
Dept: RADIATION ONCOLOGY | Facility: CLINIC | Age: 69
End: 2019-10-04
Attending: RADIOLOGY
Payer: COMMERCIAL

## 2019-10-04 ENCOUNTER — APPOINTMENT (OUTPATIENT)
Dept: LAB | Facility: CLINIC | Age: 69
End: 2019-10-04
Attending: INTERNAL MEDICINE
Payer: COMMERCIAL

## 2019-10-04 ENCOUNTER — ONCOLOGY VISIT (OUTPATIENT)
Dept: ONCOLOGY | Facility: CLINIC | Age: 69
End: 2019-10-04
Attending: PHYSICIAN ASSISTANT
Payer: COMMERCIAL

## 2019-10-04 ENCOUNTER — HEALTH MAINTENANCE LETTER (OUTPATIENT)
Age: 69
End: 2019-10-04

## 2019-10-04 VITALS
OXYGEN SATURATION: 98 % | RESPIRATION RATE: 18 BRPM | HEART RATE: 66 BPM | WEIGHT: 125 LBS | BODY MASS INDEX: 25.25 KG/M2 | TEMPERATURE: 98 F | DIASTOLIC BLOOD PRESSURE: 82 MMHG | SYSTOLIC BLOOD PRESSURE: 131 MMHG

## 2019-10-04 DIAGNOSIS — C83.390 CNS LYMPHOMA: ICD-10-CM

## 2019-10-04 DIAGNOSIS — C83.390 CNS LYMPHOMA: Primary | ICD-10-CM

## 2019-10-04 PROBLEM — H25.813 MIXED TYPE AGE-RELATED CATARACT, BOTH EYES: Status: ACTIVE | Noted: 2019-08-22

## 2019-10-04 PROBLEM — Z76.89 HEALTH CARE HOME: Status: ACTIVE | Noted: 2017-02-17

## 2019-10-04 PROBLEM — G04.90: Status: ACTIVE | Noted: 2019-10-04

## 2019-10-04 PROBLEM — L71.9 ROSACEA: Status: ACTIVE | Noted: 2019-10-04

## 2019-10-04 LAB
ALBUMIN SERPL-MCNC: 3.4 G/DL (ref 3.4–5)
ALBUMIN SERPL-MCNC: 3.5 G/DL (ref 3.4–5)
ALP SERPL-CCNC: 79 U/L (ref 40–150)
ALP SERPL-CCNC: 84 U/L (ref 40–150)
ALT SERPL W P-5'-P-CCNC: 117 U/L (ref 0–50)
ALT SERPL W P-5'-P-CCNC: 118 U/L (ref 0–50)
ANION GAP SERPL CALCULATED.3IONS-SCNC: 9 MMOL/L (ref 3–14)
AST SERPL W P-5'-P-CCNC: 55 U/L (ref 0–45)
AST SERPL W P-5'-P-CCNC: ABNORMAL U/L (ref 0–45)
BASOPHILS # BLD AUTO: 0 10E9/L (ref 0–0.2)
BASOPHILS NFR BLD AUTO: 0.2 %
BILIRUB DIRECT SERPL-MCNC: 0.2 MG/DL (ref 0–0.2)
BILIRUB SERPL-MCNC: 1 MG/DL (ref 0.2–1.3)
BILIRUB SERPL-MCNC: 1.2 MG/DL (ref 0.2–1.3)
BUN SERPL-MCNC: 28 MG/DL (ref 7–30)
CALCIUM SERPL-MCNC: 8.8 MG/DL (ref 8.5–10.1)
CHLORIDE SERPL-SCNC: 103 MMOL/L (ref 94–109)
CO2 SERPL-SCNC: 22 MMOL/L (ref 20–32)
CREAT SERPL-MCNC: 0.78 MG/DL (ref 0.52–1.04)
DIFFERENTIAL METHOD BLD: ABNORMAL
EOSINOPHIL # BLD AUTO: 0 10E9/L (ref 0–0.7)
EOSINOPHIL NFR BLD AUTO: 0 %
ERYTHROCYTE [DISTWIDTH] IN BLOOD BY AUTOMATED COUNT: 13.4 % (ref 10–15)
GFR SERPL CREATININE-BSD FRML MDRD: 79 ML/MIN/{1.73_M2}
GLUCOSE SERPL-MCNC: 208 MG/DL (ref 70–99)
HCT VFR BLD AUTO: 38.7 % (ref 35–47)
HGB BLD-MCNC: 13.2 G/DL (ref 11.7–15.7)
IMM GRANULOCYTES # BLD: 0.2 10E9/L (ref 0–0.4)
IMM GRANULOCYTES NFR BLD: 1.5 %
LYMPHOCYTES # BLD AUTO: 0.7 10E9/L (ref 0.8–5.3)
LYMPHOCYTES NFR BLD AUTO: 6.2 %
MCH RBC QN AUTO: 28.2 PG (ref 26.5–33)
MCHC RBC AUTO-ENTMCNC: 34.1 G/DL (ref 31.5–36.5)
MCV RBC AUTO: 83 FL (ref 78–100)
MONOCYTES # BLD AUTO: 0.3 10E9/L (ref 0–1.3)
MONOCYTES NFR BLD AUTO: 3.1 %
NEUTROPHILS # BLD AUTO: 9.6 10E9/L (ref 1.6–8.3)
NEUTROPHILS NFR BLD AUTO: 89 %
NRBC # BLD AUTO: 0 10*3/UL
NRBC BLD AUTO-RTO: 0 /100
PLATELET # BLD AUTO: 281 10E9/L (ref 150–450)
POTASSIUM SERPL-SCNC: 3.8 MMOL/L (ref 3.4–5.3)
POTASSIUM SERPL-SCNC: 4.9 MMOL/L (ref 3.4–5.3)
PROT SERPL-MCNC: 6.8 G/DL (ref 6.8–8.8)
PROT SERPL-MCNC: 7.3 G/DL (ref 6.8–8.8)
RBC # BLD AUTO: 4.68 10E12/L (ref 3.8–5.2)
SODIUM SERPL-SCNC: 134 MMOL/L (ref 133–144)
URATE SERPL-MCNC: 3.1 MG/DL (ref 2.6–6)
WBC # BLD AUTO: 10.8 10E9/L (ref 4–11)

## 2019-10-04 PROCEDURE — 80076 HEPATIC FUNCTION PANEL: CPT | Performed by: PHYSICIAN ASSISTANT

## 2019-10-04 PROCEDURE — G0463 HOSPITAL OUTPT CLINIC VISIT: HCPCS | Mod: ZF

## 2019-10-04 PROCEDURE — 82565 ASSAY OF CREATININE: CPT | Performed by: PHYSICIAN ASSISTANT

## 2019-10-04 PROCEDURE — 87799 DETECT AGENT NOS DNA QUANT: CPT | Performed by: INTERNAL MEDICINE

## 2019-10-04 PROCEDURE — 84155 ASSAY OF PROTEIN SERUM: CPT | Performed by: PHYSICIAN ASSISTANT

## 2019-10-04 PROCEDURE — 99214 OFFICE O/P EST MOD 30 MIN: CPT | Mod: ZP | Performed by: PHYSICIAN ASSISTANT

## 2019-10-04 PROCEDURE — 82247 BILIRUBIN TOTAL: CPT | Performed by: PHYSICIAN ASSISTANT

## 2019-10-04 PROCEDURE — 25800030 ZZH RX IP 258 OP 636: Mod: ZF | Performed by: PHYSICIAN ASSISTANT

## 2019-10-04 PROCEDURE — 84460 ALANINE AMINO (ALT) (SGPT): CPT | Performed by: PHYSICIAN ASSISTANT

## 2019-10-04 PROCEDURE — 96415 CHEMO IV INFUSION ADDL HR: CPT

## 2019-10-04 PROCEDURE — 80048 BASIC METABOLIC PNL TOTAL CA: CPT | Performed by: PHYSICIAN ASSISTANT

## 2019-10-04 PROCEDURE — 82947 ASSAY GLUCOSE BLOOD QUANT: CPT | Mod: ZF | Performed by: PHYSICIAN ASSISTANT

## 2019-10-04 PROCEDURE — 25000128 H RX IP 250 OP 636: Mod: ZF | Performed by: PHYSICIAN ASSISTANT

## 2019-10-04 PROCEDURE — 82040 ASSAY OF SERUM ALBUMIN: CPT | Performed by: PHYSICIAN ASSISTANT

## 2019-10-04 PROCEDURE — 96413 CHEMO IV INFUSION 1 HR: CPT

## 2019-10-04 PROCEDURE — 84075 ASSAY ALKALINE PHOSPHATASE: CPT | Performed by: PHYSICIAN ASSISTANT

## 2019-10-04 PROCEDURE — 84295 ASSAY OF SERUM SODIUM: CPT | Performed by: PHYSICIAN ASSISTANT

## 2019-10-04 PROCEDURE — 77412 RADIATION TX DELIVERY LVL 3: CPT | Performed by: RADIOLOGY

## 2019-10-04 PROCEDURE — 36415 COLL VENOUS BLD VENIPUNCTURE: CPT

## 2019-10-04 PROCEDURE — 25000132 ZZH RX MED GY IP 250 OP 250 PS 637: Mod: ZF | Performed by: PHYSICIAN ASSISTANT

## 2019-10-04 PROCEDURE — 84550 ASSAY OF BLOOD/URIC ACID: CPT | Performed by: PHYSICIAN ASSISTANT

## 2019-10-04 PROCEDURE — 84520 ASSAY OF UREA NITROGEN: CPT | Performed by: PHYSICIAN ASSISTANT

## 2019-10-04 PROCEDURE — 82374 ASSAY BLOOD CARBON DIOXIDE: CPT | Performed by: PHYSICIAN ASSISTANT

## 2019-10-04 PROCEDURE — 82435 ASSAY OF BLOOD CHLORIDE: CPT | Performed by: PHYSICIAN ASSISTANT

## 2019-10-04 PROCEDURE — 84132 ASSAY OF SERUM POTASSIUM: CPT | Performed by: PHYSICIAN ASSISTANT

## 2019-10-04 PROCEDURE — 85025 COMPLETE CBC W/AUTO DIFF WBC: CPT | Performed by: PHYSICIAN ASSISTANT

## 2019-10-04 PROCEDURE — 82310 ASSAY OF CALCIUM: CPT | Performed by: PHYSICIAN ASSISTANT

## 2019-10-04 RX ORDER — PROCHLORPERAZINE MALEATE 5 MG
5-10 TABLET ORAL EVERY 6 HOURS PRN
Status: DISCONTINUED | OUTPATIENT
Start: 2019-10-04 | End: 2019-10-04 | Stop reason: CLARIF

## 2019-10-04 RX ORDER — MEPERIDINE HYDROCHLORIDE 25 MG/ML
25 INJECTION INTRAMUSCULAR; INTRAVENOUS; SUBCUTANEOUS EVERY 30 MIN PRN
Status: CANCELLED | OUTPATIENT
Start: 2019-10-04

## 2019-10-04 RX ORDER — LORAZEPAM 0.5 MG/1
.5-1 TABLET ORAL EVERY 6 HOURS PRN
Status: CANCELLED
Start: 2019-10-04

## 2019-10-04 RX ORDER — SODIUM CHLORIDE 9 MG/ML
1000 INJECTION, SOLUTION INTRAVENOUS CONTINUOUS PRN
Status: CANCELLED
Start: 2019-10-04

## 2019-10-04 RX ORDER — LORAZEPAM 0.5 MG/1
.5-1 TABLET ORAL EVERY 6 HOURS PRN
Status: DISCONTINUED | OUTPATIENT
Start: 2019-10-04 | End: 2019-10-04 | Stop reason: CLARIF

## 2019-10-04 RX ORDER — EPINEPHRINE 1 MG/ML
0.3 INJECTION, SOLUTION INTRAMUSCULAR; SUBCUTANEOUS EVERY 5 MIN PRN
Status: CANCELLED | OUTPATIENT
Start: 2019-10-04

## 2019-10-04 RX ORDER — DIPHENHYDRAMINE HCL 25 MG
50 CAPSULE ORAL ONCE
Status: COMPLETED | OUTPATIENT
Start: 2019-10-04 | End: 2019-10-04

## 2019-10-04 RX ORDER — ACETAMINOPHEN 325 MG/1
650 TABLET ORAL ONCE
Status: CANCELLED
Start: 2019-10-04

## 2019-10-04 RX ORDER — DIPHENHYDRAMINE HYDROCHLORIDE 50 MG/ML
50 INJECTION INTRAMUSCULAR; INTRAVENOUS
Status: CANCELLED
Start: 2019-10-04

## 2019-10-04 RX ORDER — ACETAMINOPHEN 325 MG/1
650 TABLET ORAL ONCE
Status: COMPLETED | OUTPATIENT
Start: 2019-10-04 | End: 2019-10-04

## 2019-10-04 RX ORDER — ALBUTEROL SULFATE 90 UG/1
1-2 AEROSOL, METERED RESPIRATORY (INHALATION)
Status: CANCELLED
Start: 2019-10-04

## 2019-10-04 RX ORDER — NALOXONE HYDROCHLORIDE 0.4 MG/ML
.1-.4 INJECTION, SOLUTION INTRAMUSCULAR; INTRAVENOUS; SUBCUTANEOUS
Status: CANCELLED | OUTPATIENT
Start: 2019-10-04

## 2019-10-04 RX ORDER — ALBUTEROL SULFATE 0.83 MG/ML
2.5 SOLUTION RESPIRATORY (INHALATION)
Status: CANCELLED | OUTPATIENT
Start: 2019-10-04

## 2019-10-04 RX ORDER — METHYLPREDNISOLONE SODIUM SUCCINATE 125 MG/2ML
125 INJECTION, POWDER, LYOPHILIZED, FOR SOLUTION INTRAMUSCULAR; INTRAVENOUS
Status: CANCELLED
Start: 2019-10-04

## 2019-10-04 RX ORDER — PROCHLORPERAZINE MALEATE 5 MG
5-10 TABLET ORAL EVERY 6 HOURS PRN
Status: CANCELLED
Start: 2019-10-04

## 2019-10-04 RX ORDER — DIPHENHYDRAMINE HCL 25 MG
50 CAPSULE ORAL ONCE
Status: CANCELLED
Start: 2019-10-04

## 2019-10-04 RX ADMIN — DIPHENHYDRAMINE HYDROCHLORIDE 50 MG: 25 CAPSULE ORAL at 09:25

## 2019-10-04 RX ADMIN — RITUXIMAB 600 MG: 10 INJECTION, SOLUTION INTRAVENOUS at 10:05

## 2019-10-04 RX ADMIN — ACETAMINOPHEN 650 MG: 325 TABLET ORAL at 09:24

## 2019-10-04 ASSESSMENT — PAIN SCALES - GENERAL: PAINLEVEL: NO PAIN (0)

## 2019-10-04 NOTE — PROGRESS NOTES
Infusion Nursing Note:  Simran Negro presents today for C2D1 Rituxan.    Patient seen by provider today: Yes: Viktoria ESPINOZA   present during visit today: Yes, Language: Hmong.     Note: Patient feels well. No complaints made. Otherwise well    Patient had Rituxan dose last 9/26/19, Per RN notes from hospital encounter, patient tolerated rituximab infusion well, however, only made it to 250 ml/hr max rate. To give on standard titration as per Pharmacy recommendation.     Rituxan rates as follows:  100 ml/hr x 30 minutes  200 ml/hr x 30 minutes  300 ml/hr x 30 minutes  400 ml/hr x remainder    Intravenous Access:  Peripheral IV placed.    Treatment Conditions:  Lab Results   Component Value Date    HGB 13.2 10/04/2019     Lab Results   Component Value Date    WBC 10.8 10/04/2019      Lab Results   Component Value Date    ANEU 9.6 10/04/2019     Lab Results   Component Value Date     10/04/2019      Lab Results   Component Value Date     10/04/2019                   Lab Results   Component Value Date    POTASSIUM 4.9 10/04/2019           Lab Results   Component Value Date    MAG 1.8 09/25/2019            Lab Results   Component Value Date    CR 0.78 10/04/2019                   Lab Results   Component Value Date    EMMA 8.8 10/04/2019                Lab Results   Component Value Date    BILITOTAL PENDING 10/04/2019           Lab Results   Component Value Date    ALBUMIN PENDING 10/04/2019                    Lab Results   Component Value Date    ALT PENDING 10/04/2019           Lab Results   Component Value Date    AST Canceled, Test credited 10/04/2019       Results reviewed, labs MET treatment parameters, ok to proceed with treatment.      Post Infusion Assessment:  Patient tolerated infusion without incident.  Blood return noted pre and post infusion.  Site patent and intact, free from redness, edema or discomfort.  No evidence of extravasations.  Access discontinued per protocol.        Discharge Plan:   Patient declined prescription refills.Patient has enough supply of Dexamethasone.  Discharge instructions reviewed with: Patient and .  Patient and/or family verbalized understanding of discharge instructions and all questions answered.  Copy of AVS reviewed with patient and/or family.  Patient will return 10/11/19 for next appointment.  Patient discharged in stable condition accompanied by: self and .  Departure Mode: Ambulatory.  Face to Face time: 5.    ALBANIA SUNSHINE, ANYA, RN

## 2019-10-04 NOTE — NURSING NOTE
"Oncology Rooming Note    October 4, 2019 7:42 AM   Simran Negro is a 69 year old female who presents for:    Chief Complaint   Patient presents with     Oncology Clinic Visit     Return; CNS Lymphoma     Blood Draw     Labs drawn via  by RN in lab. VS taken.      Initial Vitals: /82 (BP Location: Right arm, Patient Position: Sitting, Cuff Size: Adult Regular)   Pulse 66   Temp 98  F (36.7  C) (Oral)   Resp 18   Wt 56.7 kg (125 lb)   SpO2 98%   BMI 25.25 kg/m   Estimated body mass index is 25.25 kg/m  as calculated from the following:    Height as of 9/20/19: 1.499 m (4' 11\").    Weight as of this encounter: 56.7 kg (125 lb). Body surface area is 1.54 meters squared.  No Pain (0) Comment: Data Unavailable   No LMP recorded. Patient is postmenopausal.  Allergies reviewed: Yes  Medications reviewed: Yes    Medications: Medication refills not needed today.  Pharmacy name entered into Quietly:    [facility] - [place], [place] - [address] [facility] (USE ONLY [facility]) - [place], [place] - [address] [place] [address]    Clinical concerns: No new concerns. Provider was notified.      Carlyn Croft LPN            "

## 2019-10-04 NOTE — PROGRESS NOTES
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 She was diagnosed with DLBCL of the cervix in 06/2007 and was treated with 6 cycles of R-CHOP.  She attained a CR, but in early 2014 developed difficulty concentrating, gait instability, and right-sided headaches with some blurry vision.  Workup revealed an intra-axial right parietal mass.  Stereotactic biopsy on 03/19/2014 which was diagnostic for diffuse large B-cell lymphoma, non-germinal center type.  Immunophenotypically this was similar to her previous diffuse large B-cell lymphoma, and this was presumed CNS relapse.  She was started on MT-R (methotrexate, temozolomide and rituximab) therapy in 03/2014.  Her course was complicated by line-related DVT, and she completed a course of enoxaparin for this.  She attained a complete clinical and radiographic response after completing the MT-R treatment regimen and EA consolidation in 8/2014.    A routine brain MRI on 9/19/19 revealed two new b/l frontal lobe lesions concerning for recurrent lymphoma. She was admitted for further workup and management. CT CAP completed, no e/o distant sites of disease. PET scan confirms brain lesions as only sites of disease. She underwent LP in attempt to diagnose the 2 brain lesions, unfortunately flow was negative. NSG was consulted to consideration of a brain biopsy, however, after extensive discussion the patient declined the procedure. Preferred to pursue modified treatment plan and whole brain radiation. She was started on C1D1 Rituxan with 5 days of dexamethasone 40mg daily on 9/26. Radiation Oncology was also consulted during admission. They are willing to offer whole brain radiation without a tissue diagnosis. Simulation was completed prior to discharge on 9/27.    She presents today prior to cycle 2 Rituxan.    Interval History:  Simran  "is seen with [facility] . She still reports some difficulty swallowing. She states she has no problem with swallowing solids, but sometimes when drinking water with meds she coughs. She is taking fewer medications at once and this has helped. She denies any cough otherwise, fevers, dyspnea. She does have bodyaches, but no headaches. She denies dizziness, but she does occasionally \"trips\" and loses her balance due to weakness. She states she fell a couple times at home onto her knees. She has a walker but states it is heavy and more often uses a cane. The falls have occurred when trying to go up stairs. She lives with her son who is not always there to assist her as he works. She has numbness/tingling in hands and from knees to feet that started this past month but she thinks this has improved. She states she has more numbness in right foot compared to left. She started radiation this week but does not recall which day. She is unsure which day of the week it is, but is able to say that it is October 4th.     Review of Systems:  Patient denies any of the following except if noted above: she has some blurry vision but no diplopia. Denies chest pain, abdominal pain, nausea, vomiting, diarrhea. She states she has fewer bowel movements as not eating as much but denies constipation. Denies urinary concerns,  rashes or skin lesions, bleeding or bruising issues.    Current Outpatient Medications   Medication Sig Dispense Refill     Acetaminophen (TYLENOL PO) Take 325 mg by mouth       acetaminophen-codeine (TYLENOL #3) 300-30 MG tablet Take 1-2 tablets by mouth every 4 hours as needed for severe pain       allopurinol (ZYLOPRIM) 300 MG tablet Take 1 tablet (300 mg) by mouth daily 30 tablet 0     ammonium lactate (AMLACTIN) 12 % cream Apply two to three times a day as needed.       aspirin 81 MG EC tablet Take 1 tablet (81 mg) by mouth daily       calcium carbonate-vitamin D (OYSTER SHELL CALCIUM/D) 500-200 MG-UNIT " tablet Take 1 tablet by mouth 2 times daily       cetirizine (ZYRTEC) 10 MG tablet Take 1 tablet (10 mg) by mouth daily as needed for allergies       CYANOCOBALAMIN PO Take 500 mcg by mouth daily       dexamethasone (DECADRON) 4 MG tablet Take 2.5 tablets (10 mg) by mouth 4 times daily on 9/28, 9/29, and 9/30 30 tablet 0     fluticasone (FLONASE) 50 MCG/ACT nasal spray Spray 2 sprays in nostril daily as needed for rhinitis or allergies       magnesium gluconate (MAGONATE) 500 MG tablet Take 500 mg by mouth       Multiple Vitamin (MULTI-VITAMINS) TABS        omeprazole (PRILOSEC) 20 MG DR capsule Take 1 capsule (20 mg) by mouth daily       ORDER FOR DME Please dispense to take 3 Ensure/day. (Patient not taking: Reported on 2/19/2018) 90 Units 3     polyethylene glycol (MIRALAX/GLYCOLAX) packet Take 17 g by mouth daily Hold for loose stools 30 packet 0     ranitidine (ZANTAC) 150 MG tablet Take 1 tablet (150 mg) by mouth 2 times daily       senna-docusate (SENOKOT-S/PERICOLACE) 8.6-50 MG tablet Take 1 tablet by mouth 2 times daily as needed for constipation 60 tablet 0     simethicone (MYLICON) 80 MG chewable tablet Take 1 tablet (80 mg) by mouth every 6 hours as needed for cramping 30 tablet 0     zolpidem (AMBIEN) 5 MG tablet Take 1 tablet (5 mg) by mouth nightly as needed for sleep         Physical Examination:  General: The patient is a pleasant female in no acute distress.  /82 (BP Location: Right arm, Patient Position: Sitting, Cuff Size: Adult Regular)   Pulse 66   Temp 98  F (36.7  C) (Oral)   Resp 18   Wt 56.7 kg (125 lb)   SpO2 98%   BMI 25.25 kg/m    Wt Readings from Last 10 Encounters:   10/04/19 56.7 kg (125 lb)   09/27/19 59.4 kg (130 lb 15.3 oz)   09/09/19 58.7 kg (129 lb 6.6 oz)   03/04/19 61.6 kg (135 lb 12.8 oz)   08/31/18 62.5 kg (137 lb 11.2 oz)   02/19/18 61.2 kg (135 lb)   08/21/17 62.4 kg (137 lb 9.1 oz)   02/20/17 63.9 kg (140 lb 12.8 oz)   08/22/16 63.5 kg (140 lb)   05/23/16 66.1  kg (145 lb 11.6 oz)     HEENT: EOMI, PERRL. Sclerae are anicteric. Oral mucosa is pink and moist with no lesions or thrush.   Lymph: Neck is supple with no lymphadenopathy in the cervical or supraclavicular areas.   Heart: Regular rate and rhythm.   Lungs: Clear to auscultation bilaterally.   Abdomen: Bowel sounds present, soft, nontender with no palpable hepatosplenomegaly or masses.   Extremities: No lower extremity edema noted bilaterally.   Neuro: Cranial nerves II through XII are grossly intact. Sensation to light touch decreased in right foot/leg versus left per patient. Left leg weaker on hip flexion, foot dorsiflexion and right leg, 4/5 compared to 5/5 on right. No sway on Romberg. Able to do F-->N slowly but accurately. Observed her walking in clinic where she was without cane or walker and although slow and deliberate, did not lose balance.   Skin: No rashes, petechiae, or bruising noted on exposed skin.    Laboratory Data:  No results found for this or any previous visit (from the past 24 hour(s)).      Assessment and Plan:    H/o DLBCL with CNS relapse s/p MT-R with CR 2014  New frontal lobe masses  A routine brain MRI on 9/19/19 revealed two new b/l frontal lobe lesions, left sided lesion is 4.2x3.2x3.9cm in size and right sided lesion is 1.9x2.7x3.3cm, concerning for recurrent lymphoma. Reports fatigue and poor appetite as most significant symptoms over the last month. Sons endorse she has been forgetful with slurred speech and unsteadiness when walking.  - Neurosurgery consult for biopsy consideration, recs appreciated. Prefer to pursue LP as initial step in diagnosis. Given negative CSF flow, NSG recommending brain biopsy, however, patient declined   - LP 9/23. CSF flow negative. Cryptococcus, Toxoplasma negative. Cysticercus antibody IgG negative   - PET scan completed on 9/24, brain is only site of disease. 9/21 CT c/a/p showed no evidence of lymphoma    --Started on Rituxan + dex 40 mg x 5 days on  9/26. Here for week 2 rituxan  --WBRT started on 10/2, scheduled through 10/18  --EBV PCR positive on 9/26. EBV PCR today pending  --Per Dr. Dailey, will need MRI brain 6-8 weeks after radiation complete then follow up with him.     Dysphagia  - SLP consulted during admission. Diet advanced to regular diet with thin liquids.     TLS, low risk currently  Uric acid 8.0 on admission. No other evidence of TLS. No e/o DIC on admission. Improved to 3.4 on 9/24. No acute issues during hospitalization.  --Uric acid pending  - Continue allopurinol 300 mg daily.      GERD  - Continue Omeprazole and Ranitidine     Moderate malnutrition  Present on admission. Reports fatigue and poor appetite over the last month. Has lost about 15 lbs.  - Encourage small, frequent meals  - Supplements between meals     Viktoria Thomson PA-C  [facility]  [address], [place] [id number] [phone]

## 2019-10-04 NOTE — NURSING NOTE
Chief Complaint   Patient presents with     Oncology Clinic Visit     Return; CNS Lymphoma     Blood Draw     Labs drawn via  by RN in lab. VS taken.      Traci Reyes RN

## 2019-10-04 NOTE — LETTER
RE: Simran Negro  [address]   [facility] [address]     Dear Colleague,    Thank you for referring your patient, Simran Negro, to the [facility]. Please see a copy of my visit note below.    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 She was diagnosed with DLBCL of the cervix in 06/2007 and was treated with 6 cycles of R-CHOP.  She attained a CR, but in early 2014 developed difficulty concentrating, gait instability, and right-sided headaches with some blurry vision.  Workup revealed an intra-axial right parietal mass.  Stereotactic biopsy on 03/19/2014 which was diagnostic for diffuse large B-cell lymphoma, non-germinal center type.  Immunophenotypically this was similar to her previous diffuse large B-cell lymphoma, and this was presumed CNS relapse.  She was started on MT-R (methotrexate, temozolomide and rituximab) therapy in 03/2014.  Her course was complicated by line-related DVT, and she completed a course of enoxaparin for this.  She attained a complete clinical and radiographic response after completing the MT-R treatment regimen and EA consolidation in 8/2014.    A routine brain MRI on 9/19/19 revealed two new b/l frontal lobe lesions concerning for recurrent lymphoma. She was admitted for further workup and management. CT CAP completed, no e/o distant sites of disease. PET scan confirms brain lesions as only sites of disease. She underwent LP in attempt to diagnose the 2 brain lesions, unfortunately flow was negative. [facility] was consulted to consideration of a brain biopsy, however, after extensive discussion the patient declined the procedure. Preferred to pursue modified treatment plan and whole brain radiation. She was started on C1D1 Rituxan with 5 days of dexamethasone 40mg daily on 9/26. Radiation Oncology was also consulted  "during admission. They are willing to offer whole brain radiation without a tissue diagnosis. Simulation was completed prior to discharge on 9/27.    She presents today prior to cycle 2 Rituxan.    Interval History:  Simran is seen with [facility] . She still reports some difficulty swallowing. She states she has no problem with swallowing solids, but sometimes when drinking water with meds she coughs. She is taking fewer medications at once and this has helped. She denies any cough otherwise, fevers, dyspnea. She does have bodyaches, but no headaches. She denies dizziness, but she does occasionally \"trips\" and loses her balance due to weakness. She states she fell a couple times at home onto her knees. She has a walker but states it is heavy and more often uses a cane. The falls have occurred when trying to go up stairs. She lives with her son who is not always there to assist her as he works. She has numbness/tingling in hands and from knees to feet that started this past month but she thinks this has improved. She states she has more numbness in right foot compared to left. She started radiation this week but does not recall which day. She is unsure which day of the week it is, but is able to say that it is October 4th.     Review of Systems:  Patient denies any of the following except if noted above: she has some blurry vision but no diplopia. Denies chest pain, abdominal pain, nausea, vomiting, diarrhea. She states she has fewer bowel movements as not eating as much but denies constipation. Denies urinary concerns,  rashes or skin lesions, bleeding or bruising issues.    Current Outpatient Medications   Medication Sig Dispense Refill     Acetaminophen (TYLENOL PO) Take 325 mg by mouth       acetaminophen-codeine (TYLENOL #3) 300-30 MG tablet Take 1-2 tablets by mouth every 4 hours as needed for severe pain       allopurinol (ZYLOPRIM) 300 MG tablet Take 1 tablet (300 mg) by mouth daily 30 tablet 0     " ammonium lactate (AMLACTIN) 12 % cream Apply two to three times a day as needed.       aspirin 81 MG EC tablet Take 1 tablet (81 mg) by mouth daily       calcium carbonate-vitamin D (OYSTER SHELL CALCIUM/D) 500-200 MG-UNIT tablet Take 1 tablet by mouth 2 times daily       cetirizine (ZYRTEC) 10 MG tablet Take 1 tablet (10 mg) by mouth daily as needed for allergies       CYANOCOBALAMIN PO Take 500 mcg by mouth daily       dexamethasone (DECADRON) 4 MG tablet Take 2.5 tablets (10 mg) by mouth 4 times daily on 9/28, 9/29, and 9/30 30 tablet 0     fluticasone (FLONASE) 50 MCG/ACT nasal spray Spray 2 sprays in nostril daily as needed for rhinitis or allergies       magnesium gluconate (MAGONATE) 500 MG tablet Take 500 mg by mouth       Multiple Vitamin (MULTI-VITAMINS) TABS        omeprazole (PRILOSEC) 20 MG DR capsule Take 1 capsule (20 mg) by mouth daily       ORDER FOR DME Please dispense to take 3 Ensure/day. (Patient not taking: Reported on 2/19/2018) 90 Units 3     polyethylene glycol (MIRALAX/GLYCOLAX) packet Take 17 g by mouth daily Hold for loose stools 30 packet 0     ranitidine (ZANTAC) 150 MG tablet Take 1 tablet (150 mg) by mouth 2 times daily       senna-docusate (SENOKOT-S/PERICOLACE) 8.6-50 MG tablet Take 1 tablet by mouth 2 times daily as needed for constipation 60 tablet 0     simethicone (MYLICON) 80 MG chewable tablet Take 1 tablet (80 mg) by mouth every 6 hours as needed for cramping 30 tablet 0     zolpidem (AMBIEN) 5 MG tablet Take 1 tablet (5 mg) by mouth nightly as needed for sleep         Physical Examination:  General: The patient is a pleasant female in no acute distress.  /82 (BP Location: Right arm, Patient Position: Sitting, Cuff Size: Adult Regular)   Pulse 66   Temp 98  F (36.7  C) (Oral)   Resp 18   Wt 56.7 kg (125 lb)   SpO2 98%   BMI 25.25 kg/m     Wt Readings from Last 10 Encounters:   10/04/19 56.7 kg (125 lb)   09/27/19 59.4 kg (130 lb 15.3 oz)   09/09/19 58.7 kg (129 lb  6.6 oz)   03/04/19 61.6 kg (135 lb 12.8 oz)   08/31/18 62.5 kg (137 lb 11.2 oz)   02/19/18 61.2 kg (135 lb)   08/21/17 62.4 kg (137 lb 9.1 oz)   02/20/17 63.9 kg (140 lb 12.8 oz)   08/22/16 63.5 kg (140 lb)   05/23/16 66.1 kg (145 lb 11.6 oz)     HEENT: EOMI, PERRL. Sclerae are anicteric. Oral mucosa is pink and moist with no lesions or thrush.   Lymph: Neck is supple with no lymphadenopathy in the cervical or supraclavicular areas.   Heart: Regular rate and rhythm.   Lungs: Clear to auscultation bilaterally.   Abdomen: Bowel sounds present, soft, nontender with no palpable hepatosplenomegaly or masses.   Extremities: No lower extremity edema noted bilaterally.   Neuro: Cranial nerves II through XII are grossly intact. Sensation to light touch decreased in right foot/leg versus left per patient. Left leg weaker on hip flexion, foot dorsiflexion and right leg, 4/5 compared to 5/5 on right. No sway on Romberg. Able to do F-->N slowly but accurately. Observed her walking in clinic where she was without cane or walker and although slow and deliberate, did not lose balance.   Skin: No rashes, petechiae, or bruising noted on exposed skin.    Laboratory Data:     10/4/2019 07:30 10/4/2019 11:08   Sodium 134    Potassium 4.9 3.8   Chloride 103    Carbon Dioxide 22    Urea Nitrogen 28    Creatinine 0.78    GFR Estimate 79    GFR Estimate If Black >90    Calcium 8.8    Anion Gap 9    Albumin 3.5 3.4   Protein Total 7.3 6.8   Bilirubin Total 1.2 1.0   Alkaline Phosphatase 84 79    (H) 118 (H)   AST Canceled, Test credited 55 (H)   Bilirubin Direct  0.2   Uric Acid 3.1    Glucose 208 (H)       10/4/2019 07:30   WBC 10.8   Hemoglobin 13.2   Hematocrit 38.7   Platelet Count 281   RBC Count 4.68   MCV 83   MCH 28.2   MCHC 34.1   RDW 13.4   Diff Method Automated Method   % Neutrophils 89.0   % Lymphocytes 6.2   % Monocytes 3.1   % Eosinophils 0.0   % Basophils 0.2   % Immature Granulocytes 1.5   Nucleated RBCs 0   Absolute  Neutrophil 9.6 (H)   Absolute Lymphocytes 0.7 (L)   Absolute Monocytes 0.3   Absolute Eosinophils 0.0   Absolute Basophils 0.0   Abs Immature Granulocytes 0.2   Absolute Nucleated RBC 0.0     Assessment and Plan:    H/o DLBCL with CNS relapse s/p MT-R with CR 2014  New frontal lobe masses  A routine brain MRI on 9/19/19 revealed two new b/l frontal lobe lesions, left sided lesion is 4.2x3.2x3.9cm in size and right sided lesion is 1.9x2.7x3.3cm, concerning for recurrent lymphoma. Reports fatigue and poor appetite as most significant symptoms over the last month. Sons endorse she has been forgetful with slurred speech and unsteadiness when walking.  - Neurosurgery consult for biopsy consideration, recs appreciated. Prefer to pursue LP as initial step in diagnosis. Given negative CSF flow, NSG recommending brain biopsy, however, patient declined   - LP 9/23. CSF flow negative. Cryptococcus, Toxoplasma negative. Cysticercus antibody IgG negative   - PET scan completed on 9/24, brain is only site of disease. 9/21 CT c/a/p showed no evidence of lymphoma    --Started on Rituxan + dex 40 mg x 5 days on 9/26. Here for week 2 rituxan  --WBRT started on 10/2, scheduled through 10/18  --EBV PCR positive on 9/26, 10/4  --Per Dr. Dailey, will need MRI brain 6-8 weeks after radiation complete then follow up with him.     Dysphagia  - SLP consulted during admission. Diet advanced to regular diet with thin liquids.     TLS, low risk currently  Uric acid 8.0 on admission. No other evidence of TLS. No e/o DIC on admission. Improved to 3.4 on 9/24. No acute issues during hospitalization.  --Uric acid 3.1  - Continue allopurinol 300 mg daily.      GERD  - Continue Omeprazole and Ranitidine     Moderate malnutrition  Present on admission. Reports fatigue and poor appetite over the last month. Has lost about 15 lbs.  - Encourage small, frequent meals  - Supplements between meals     Viktoria Thomson PA-C  [facility] [address] [place] [address]  [phone]

## 2019-10-04 NOTE — PATIENT INSTRUCTIONS
Contact Numbers  [facility]: [phone]    After Hours:  [phone]  Triage: [phone]    Please call the [facility] Triage line if you experience a temperature greater than or equal to 100.5, shaking chills, have uncontrolled nausea, vomiting and/or diarrhea, dizziness, shortness of breath, chest pain, bleeding, unexplained bruising, or if you have any other new/concerning symptoms, questions or concerns.     If it is after hours, weekends, or holidays, please call the main hospital  at  [phone] and ask to speak to the Oncology doctor on call.     If you are having any concerning symptoms or wish to speak to a provider before your next infusion visit, please call your care coordinator or triage to notify them so we can adequately serve you.     If you need a refill on a narcotic prescription or other medication, please call triage before your infusion appointment.         October 2019 Sunday Monday Tuesday Wednesday Thursday Friday Saturday             1    [place] OUTPATIENT   2:30 PM   (45 min.)   Ken Negro    Services Department    [facility] EXTERNAL RADIATION TREATMT   2:30 PM   (30 min.)   [facility] RAD ONC ELEKTA   Radiation Oncology Clinic 2    [place] OUTPATIENT   1:15 PM   (30 min.)   [facility] CONNECTION    Services Department    [facility] EXTERNAL RADIATION TREATMT   1:30 PM   (15 min.)   [facility] RAD ONC ELEKTA   Radiation Oncology Clinic 3    [place] OUTPATIENT   1:15 PM   (60 min.)   Simran Negro    Services Department    [facility] EXTERNAL RADIATION TREATMT   1:30 PM   (15 min.)   [facility] RAD ONC ELEKTA   Radiation Oncology Clinic 4    [facility] LAB DRAW   7:00 AM   (15 min.)    [facility] DRAW   [facility] Lab Draw    [facility] RETURN   7:15 AM   (90 min.)   Viktoria Thomson PA   [facility] ONC INFUSION 360   8:30 AM   (360 min.)   [facility]   [facility] OUTPATIENT   1:15  PM   (30 min.)   [facility] CONNECTION    Services Department    [facility] EXTERNAL RADIATION TREATMT   1:30 PM   (15 min.)   [facility] RAD ONC ELEKTA   Radiation Oncology Clinic 5       6     7    [facility] ON TREATMENT VISIT   9:00 AM   (60 min.)   Chin Simmons MD   Radiation Oncology Clinic    [facility] EXTERNAL RADIATION TREATMT   9:00 AM   (15 min.)   [facility] RAD ONC ELEKTA   Radiation Oncology Clinic 8    [facility] EXTERNAL RADIATION TREATMT   2:30 PM   (15 min.)   [facility] RAD ONC ELEKTA   Radiation Oncology Clinic 9    [facility] EXTERNAL RADIATION TREATMT   2:00 PM   (15 min.)   [facility] RAD ONC ELEKTA   Radiation Oncology Clinic 10    [facility] NEW  10:45 AM   (60 min.)   [facility]N [facility]    [facility] EXTERNAL RADIATION TREATMT   2:00 PM   (15 min.)   P RAD ONC ELEKTA   Radiation Oncology Clinic 11    [facility] DRAW   7:45 AM   (15 min.)    [facility] DRAW   [facility] Draw    [facility] RETURN   8:05 AM   (50 min.)   Viktoria Thomson PA   [facility]   9:30 AM   (360 min.)   [facility]   [facility]    [facility] EXTERNAL RADIATION TREATMT   2:00 PM   (15 min.)   P RAD ONC ELEKTA   Radiation Oncology Clinic 12       13     14    [facility] EXTERNAL RADIATION TREATMT  11:15 AM   (15 min.)   P RAD ONC ELEKTA   Radiation Oncology Clinic    [facility] ON TREATMENT VISIT  11:30 AM   (15 min.)   Chin Simmons MD   Radiation Oncology Clinic 15    [facility] EXTERNAL RADIATION TREATMT   2:00 PM   (15 min.)   P RAD ONC ELEKTA   Radiation Oncology Clinic 16    [facility] EXTERNAL RADIATION TREATMT   2:00 PM   (15 min.)   P RAD ONC ELEKTA   Radiation Oncology Clinic 17    [facility] EXTERNAL RADIATION TREATMT   2:00 PM   (15 min.)   P RAD ONC ELEKTA   Radiation Oncology Clinic 18    [facility] DRAW   7:00 AM   (15 min.)   [facility] DRAW   [facility] Lab Draw    [facility] RETURN   7:15 AM   (50 min.)   Viktoria Thomson PA   [facility]    [facility] ONC INFUSION 360   8:30 AM   (360 min.)    ONCOLOGY INFUSION   [facility] EXTERNAL RADIATION TREATMT   2:00 PM   (15 min.)   [facility] RAD ONC ELEKTA   Radiation Oncology Clinic 19       20     21     22     23     24     25     26       27     28     29     30     31 November 2019 Sunday Monday Tuesday Wednesday Thursday Friday Saturday                            1     2       3     4     5     6     7     8     9       10     11     12     13     14     15     16       17     18     19     20     21     22     23       24     25     26     27     28     29     30                     Lab Results:  Recent Results (from the past 12 hour(s))   CBC with platelets differential    Collection Time: 10/04/19  7:30 AM   Result Value Ref Range    WBC 10.8 4.0 - 11.0 10e9/L    RBC Count 4.68 3.8 - 5.2 10e12/L    Hemoglobin 13.2 11.7 - 15.7 g/dL    Hematocrit 38.7 35.0 - 47.0 %    MCV 83 78 - 100 fl    MCH 28.2 26.5 - 33.0 pg    MCHC 34.1 31.5 - 36.5 g/dL    RDW 13.4 10.0 - 15.0 %    Platelet Count 281 150 - 450 10e9/L    Diff Method Automated Method     % Neutrophils 89.0 %    % Lymphocytes 6.2 %    % Monocytes 3.1 %    % Eosinophils 0.0 %    % Basophils 0.2 %    % Immature Granulocytes 1.5 %    Nucleated RBCs 0 0 /100    Absolute Neutrophil 9.6 (H) 1.6 - 8.3 10e9/L    Absolute Lymphocytes 0.7 (L) 0.8 - 5.3 10e9/L    Absolute Monocytes 0.3 0.0 - 1.3 10e9/L    Absolute Eosinophils 0.0 0.0 - 0.7 10e9/L    Absolute Basophils 0.0 0.0 - 0.2 10e9/L    Abs Immature Granulocytes 0.2 0 - 0.4 10e9/L    Absolute Nucleated RBC 0.0    Comprehensive metabolic panel    Collection Time: 10/04/19  7:30 AM   Result Value Ref Range    Sodium 134 133 - 144 mmol/L    Potassium 4.9 3.4 - 5.3 mmol/L    Chloride 103 94 - 109 mmol/L    Carbon Dioxide 22 20 - 32 mmol/L    Anion Gap 9 3 - 14 mmol/L    Glucose 208 (H) 70 - 99 mg/dL    Urea Nitrogen 28 7 - 30 mg/dL    Creatinine 0.78  0.52 - 1.04 mg/dL    GFR Estimate 79 >60 mL/min/[1.73_m2]    GFR Estimate If Black >90 >60 mL/min/[1.73_m2]    Calcium 8.8 8.5 - 10.1 mg/dL    Bilirubin Total PENDING 0.2 - 1.3 mg/dL    Albumin PENDING 3.4 - 5.0 g/dL    Protein Total PENDING 6.8 - 8.8 g/dL    Alkaline Phosphatase PENDING 40 - 150 U/L    ALT PENDING 0 - 50 U/L    AST Canceled, Test credited 0 - 45 U/L

## 2019-10-04 NOTE — PROGRESS NOTES
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 She was diagnosed with DLBCL of the cervix in 06/2007 and was treated with 6 cycles of R-CHOP.  She attained a CR, but in early 2014 developed difficulty concentrating, gait instability, and right-sided headaches with some blurry vision.  Workup revealed an intra-axial right parietal mass.  Stereotactic biopsy on 03/19/2014 which was diagnostic for diffuse large B-cell lymphoma, non-germinal center type.  Immunophenotypically this was similar to her previous diffuse large B-cell lymphoma, and this was presumed CNS relapse.  She was started on MT-R (methotrexate, temozolomide and rituximab) therapy in 03/2014.  Her course was complicated by line-related DVT, and she completed a course of enoxaparin for this.  She attained a complete clinical and radiographic response after completing the MT-R treatment regimen and EA consolidation in 8/2014.    A routine brain MRI on 9/19/19 revealed two new b/l frontal lobe lesions concerning for recurrent lymphoma. She was admitted for further workup and management. CT CAP completed, no e/o distant sites of disease. PET scan confirms brain lesions as only sites of disease. She underwent LP in attempt to diagnose the 2 brain lesions, unfortunately flow was negative. NSG was consulted to consideration of a brain biopsy, however, after extensive discussion the patient declined the procedure. Preferred to pursue modified treatment plan and whole brain radiation. She was started on C1D1 Rituxan with 5 days of dexamethasone 40mg daily on 9/26. Radiation Oncology was also consulted during admission. They are willing to offer whole brain radiation without a tissue diagnosis. Simulation was completed prior to discharge on 9/27.    She presents today prior to cycle 2 Rituxan.    Interval History:  Simran  "is seen with [facility] . She still reports some difficulty swallowing. She states she has no problem with swallowing solids, but sometimes when drinking water with meds she coughs. She is taking fewer medications at once and this has helped. She denies any cough otherwise, fevers, dyspnea. She does have bodyaches, but no headaches. She denies dizziness, but she does occasionally \"trips\" and loses her balance due to weakness. She states she fell a couple times at home onto her knees. She has a walker but states it is heavy and more often uses a cane. The falls have occurred when trying to go up stairs. She lives with her son who is not always there to assist her as he works. She has numbness/tingling in hands and from knees to feet that started this past month but she thinks this has improved. She states she has more numbness in right foot compared to left. She started radiation this week but does not recall which day. She is unsure which day of the week it is, but is able to say that it is October 4th.     Review of Systems:  Patient denies any of the following except if noted above: she has some blurry vision but no diplopia. Denies chest pain, abdominal pain, nausea, vomiting, diarrhea. She states she has fewer bowel movements as not eating as much but denies constipation. Denies urinary concerns,  rashes or skin lesions, bleeding or bruising issues.    Current Outpatient Medications   Medication Sig Dispense Refill     Acetaminophen (TYLENOL PO) Take 325 mg by mouth       acetaminophen-codeine (TYLENOL #3) 300-30 MG tablet Take 1-2 tablets by mouth every 4 hours as needed for severe pain       allopurinol (ZYLOPRIM) 300 MG tablet Take 1 tablet (300 mg) by mouth daily 30 tablet 0     ammonium lactate (AMLACTIN) 12 % cream Apply two to three times a day as needed.       aspirin 81 MG EC tablet Take 1 tablet (81 mg) by mouth daily       calcium carbonate-vitamin D (OYSTER SHELL CALCIUM/D) 500-200 MG-UNIT " tablet Take 1 tablet by mouth 2 times daily       cetirizine (ZYRTEC) 10 MG tablet Take 1 tablet (10 mg) by mouth daily as needed for allergies       CYANOCOBALAMIN PO Take 500 mcg by mouth daily       dexamethasone (DECADRON) 4 MG tablet Take 2.5 tablets (10 mg) by mouth 4 times daily on 9/28, 9/29, and 9/30 30 tablet 0     fluticasone (FLONASE) 50 MCG/ACT nasal spray Spray 2 sprays in nostril daily as needed for rhinitis or allergies       magnesium gluconate (MAGONATE) 500 MG tablet Take 500 mg by mouth       Multiple Vitamin (MULTI-VITAMINS) TABS        omeprazole (PRILOSEC) 20 MG DR capsule Take 1 capsule (20 mg) by mouth daily       ORDER FOR DME Please dispense to take 3 Ensure/day. (Patient not taking: Reported on 2/19/2018) 90 Units 3     polyethylene glycol (MIRALAX/GLYCOLAX) packet Take 17 g by mouth daily Hold for loose stools 30 packet 0     ranitidine (ZANTAC) 150 MG tablet Take 1 tablet (150 mg) by mouth 2 times daily       senna-docusate (SENOKOT-S/PERICOLACE) 8.6-50 MG tablet Take 1 tablet by mouth 2 times daily as needed for constipation 60 tablet 0     simethicone (MYLICON) 80 MG chewable tablet Take 1 tablet (80 mg) by mouth every 6 hours as needed for cramping 30 tablet 0     zolpidem (AMBIEN) 5 MG tablet Take 1 tablet (5 mg) by mouth nightly as needed for sleep         Physical Examination:  General: The patient is a pleasant female in no acute distress.  /82 (BP Location: Right arm, Patient Position: Sitting, Cuff Size: Adult Regular)   Pulse 66   Temp 98  F (36.7  C) (Oral)   Resp 18   Wt 56.7 kg (125 lb)   SpO2 98%   BMI 25.25 kg/m    Wt Readings from Last 10 Encounters:   10/04/19 56.7 kg (125 lb)   09/27/19 59.4 kg (130 lb 15.3 oz)   09/09/19 58.7 kg (129 lb 6.6 oz)   03/04/19 61.6 kg (135 lb 12.8 oz)   08/31/18 62.5 kg (137 lb 11.2 oz)   02/19/18 61.2 kg (135 lb)   08/21/17 62.4 kg (137 lb 9.1 oz)   02/20/17 63.9 kg (140 lb 12.8 oz)   08/22/16 63.5 kg (140 lb)   05/23/16 66.1  kg (145 lb 11.6 oz)     HEENT: EOMI, PERRL. Sclerae are anicteric. Oral mucosa is pink and moist with no lesions or thrush.   Lymph: Neck is supple with no lymphadenopathy in the cervical or supraclavicular areas.   Heart: Regular rate and rhythm.   Lungs: Clear to auscultation bilaterally.   Abdomen: Bowel sounds present, soft, nontender with no palpable hepatosplenomegaly or masses.   Extremities: No lower extremity edema noted bilaterally.   Neuro: Cranial nerves II through XII are grossly intact. Sensation to light touch decreased in right foot/leg versus left per patient. Left leg weaker on hip flexion, foot dorsiflexion and right leg, 4/5 compared to 5/5 on right. No sway on Romberg. Able to do F-->N slowly but accurately. Observed her walking in clinic where she was without cane or walker and although slow and deliberate, did not lose balance.   Skin: No rashes, petechiae, or bruising noted on exposed skin.    Laboratory Data:     10/4/2019 07:30 10/4/2019 11:08   Sodium 134    Potassium 4.9 3.8   Chloride 103    Carbon Dioxide 22    Urea Nitrogen 28    Creatinine 0.78    GFR Estimate 79    GFR Estimate If Black >90    Calcium 8.8    Anion Gap 9    Albumin 3.5 3.4   Protein Total 7.3 6.8   Bilirubin Total 1.2 1.0   Alkaline Phosphatase 84 79    (H) 118 (H)   AST Canceled, Test credited 55 (H)   Bilirubin Direct  0.2   Uric Acid 3.1    Glucose 208 (H)       10/4/2019 07:30   WBC 10.8   Hemoglobin 13.2   Hematocrit 38.7   Platelet Count 281   RBC Count 4.68   MCV 83   MCH 28.2   MCHC 34.1   RDW 13.4   Diff Method Automated Method   % Neutrophils 89.0   % Lymphocytes 6.2   % Monocytes 3.1   % Eosinophils 0.0   % Basophils 0.2   % Immature Granulocytes 1.5   Nucleated RBCs 0   Absolute Neutrophil 9.6 (H)   Absolute Lymphocytes 0.7 (L)   Absolute Monocytes 0.3   Absolute Eosinophils 0.0   Absolute Basophils 0.0   Abs Immature Granulocytes 0.2   Absolute Nucleated RBC 0.0     Assessment and Plan:    H/o  DLBCL with CNS relapse s/p MT-R with CR 2014  New frontal lobe masses  A routine brain MRI on 9/19/19 revealed two new b/l frontal lobe lesions, left sided lesion is 4.2x3.2x3.9cm in size and right sided lesion is 1.9x2.7x3.3cm, concerning for recurrent lymphoma. Reports fatigue and poor appetite as most significant symptoms over the last month. Sons endorse she has been forgetful with slurred speech and unsteadiness when walking.  - Neurosurgery consult for biopsy consideration, recs appreciated. Prefer to pursue LP as initial step in diagnosis. Given negative CSF flow, NSG recommending brain biopsy, however, patient declined   - LP 9/23. CSF flow negative. Cryptococcus, Toxoplasma negative. Cysticercus antibody IgG negative   - PET scan completed on 9/24, brain is only site of disease. 9/21 CT c/a/p showed no evidence of lymphoma    --Started on Rituxan + dex 40 mg x 5 days on 9/26. Here for week 2 rituxan  --WBRT started on 10/2, scheduled through 10/18  --EBV PCR positive on 9/26, 10/4  --Per Dr. Dailey, will need MRI brain 6-8 weeks after radiation complete then follow up with him.     Dysphagia  - SLP consulted during admission. Diet advanced to regular diet with thin liquids.     TLS, low risk currently  Uric acid 8.0 on admission. No other evidence of TLS. No e/o DIC on admission. Improved to 3.4 on 9/24. No acute issues during hospitalization.  --Uric acid 3.1  - Continue allopurinol 300 mg daily.      GERD  - Continue Omeprazole and Ranitidine     Moderate malnutrition  Present on admission. Reports fatigue and poor appetite over the last month. Has lost about 15 lbs.  - Encourage small, frequent meals  - Supplements between meals     Viktoria Tohmson PA-C  [facility]  [address], [place] [id number] [phone]

## 2019-10-06 LAB
EBV DNA # SPEC NAA+PROBE: <500 {COPIES}/ML
EBV DNA SPEC NAA+PROBE-LOG#: <2.7 {LOG_COPIES}/ML

## 2019-10-07 ENCOUNTER — OFFICE VISIT (OUTPATIENT)
Dept: RADIATION ONCOLOGY | Facility: CLINIC | Age: 69
End: 2019-10-07
Attending: RADIOLOGY
Payer: COMMERCIAL

## 2019-10-07 VITALS
BODY MASS INDEX: 25.15 KG/M2 | SYSTOLIC BLOOD PRESSURE: 128 MMHG | OXYGEN SATURATION: 98 % | DIASTOLIC BLOOD PRESSURE: 78 MMHG | HEART RATE: 91 BPM | WEIGHT: 124.5 LBS

## 2019-10-07 DIAGNOSIS — N30.90 BLADDER INFECTION: ICD-10-CM

## 2019-10-07 DIAGNOSIS — C83.390 CNS LYMPHOMA: Primary | ICD-10-CM

## 2019-10-07 DIAGNOSIS — C83.390 CENTRAL NERVOUS SYSTEM LYMPHOMA: ICD-10-CM

## 2019-10-07 LAB
ALBUMIN UR-MCNC: 30 MG/DL
AMORPH CRY #/AREA URNS HPF: ABNORMAL /HPF
APPEARANCE UR: ABNORMAL
BACTERIA #/AREA URNS HPF: ABNORMAL /HPF
BILIRUB UR QL STRIP: NEGATIVE
COLOR UR AUTO: YELLOW
GLUCOSE UR STRIP-MCNC: >1000 MG/DL
HGB UR QL STRIP: NEGATIVE
KETONES UR STRIP-MCNC: NEGATIVE MG/DL
LEUKOCYTE ESTERASE UR QL STRIP: ABNORMAL
MUCOUS THREADS #/AREA URNS LPF: PRESENT /LPF
NITRATE UR QL: POSITIVE
PH UR STRIP: 8.5 PH (ref 5–7)
RBC #/AREA URNS AUTO: 4 /HPF (ref 0–2)
SOURCE: ABNORMAL
SP GR UR STRIP: 1.03 (ref 1–1.03)
SQUAMOUS #/AREA URNS AUTO: 1 /HPF (ref 0–1)
TRANS CELLS #/AREA URNS HPF: <1 /HPF (ref 0–1)
TRI-PHOS CRY #/AREA URNS HPF: ABNORMAL /HPF
UROBILINOGEN UR STRIP-MCNC: NORMAL MG/DL (ref 0–2)
WBC #/AREA URNS AUTO: <1 /HPF (ref 0–5)

## 2019-10-07 PROCEDURE — 87086 URINE CULTURE/COLONY COUNT: CPT | Performed by: RADIOLOGY

## 2019-10-07 PROCEDURE — 87088 URINE BACTERIA CULTURE: CPT | Performed by: RADIOLOGY

## 2019-10-07 PROCEDURE — 81001 URINALYSIS AUTO W/SCOPE: CPT | Performed by: RADIOLOGY

## 2019-10-07 PROCEDURE — T1013 SIGN LANG/ORAL INTERPRETER: HCPCS | Mod: U3,ZF

## 2019-10-07 PROCEDURE — 77412 RADIATION TX DELIVERY LVL 3: CPT | Performed by: RADIOLOGY

## 2019-10-07 PROCEDURE — 77336 RADIATION PHYSICS CONSULT: CPT | Performed by: RADIOLOGY

## 2019-10-07 PROCEDURE — 87186 SC STD MICRODIL/AGAR DIL: CPT | Performed by: RADIOLOGY

## 2019-10-07 RX ORDER — SULFAMETHOXAZOLE/TRIMETHOPRIM 800-160 MG
1 TABLET ORAL 2 TIMES DAILY
Qty: 6 TABLET | Refills: 0 | Status: SHIPPED | OUTPATIENT
Start: 2019-10-07 | End: 2019-10-18

## 2019-10-07 NOTE — LETTER
10/7/2019       RE: Simran Negro  [address]   [place] [address]     Dear Colleague,    Thank you for referring your patient, Simran Negro, to the RADIATION ONCOLOGY CLINIC. Please see a copy of my visit note below.    [facility] ER PHYSICIANS  SPECIALIZING IN BREAKTHROUGHS  Radiation Oncology    On Treatment Visit Note      Simran Negro      Date: 10/7/2019   MRN: [id number]   : 1950  Diagnosis: Brain mets      Reason for Visit:  On Radiation Treatment Visit     Treatment Summary to Date  Treatment Site: Whole brain Current Dose: 1250/3500 cGy Fractions:       Chemotherapy  Chemo concurrent with radx?: Yes    ED Visit/Hosiptal Admission: None    Treatment Breaks: None      Subjective:   Simran is seen with [facility]  today.  She denies any scalp irritation.  Has not noted hair loss.  Her headache is very mild and she has no nausea.  Her biggest complaint is lack of appetite.  She also reports a lump sensation in the throat and she vomits up food when she eats.  She also reports vaginal discharge and urinary incontinence since the Caicedo was removed.  She has low back pain but no fevers or chills.      Nursing ROS:   Nutrition Alteration  Diet Type: Patient's Preference  Nutrition Note: appetite low  Skin  Skin Reaction: 0 - No changes     ENT and Mouth Exam  ENT/Mouth Note: Pt states some diff. with swallowing, feelis like something is in her throat and causes her to vomit. Denies pain and nausea.     Gastrointestinal  Nausea: 0 - None  Diarrhea: 0 - None  Constipation NCI: 1 - Occasional or intermittent s/sx  Genitourinary   Note: having dischsrge since cath pulled in the hospital.   Psychosocial  Pyschosocial Note: Feeling very tired  Pain Assessment  0-10 Pain Scale: 0      Objective:   /78   Pulse 91   Wt 56.5 kg (124 lb 8 oz)   SpO2 98%   BMI 25.15 kg/m     Gen: Appears well, in no acute distress  Skin: No erythema    Labs:  CBC RESULTS:   Recent Labs   Lab Test  10/04/19  0730   WBC 10.8   RBC 4.68   HGB 13.2   HCT 38.7   MCV 83   MCH 28.2   MCHC 34.1   RDW 13.4        ELECTROLYTES:  Recent Labs   Lab Test 10/04/19  1108 10/04/19  0730   NA  --  134   POTASSIUM 3.8 4.9   CHLORIDE  --  103   EMMA  --  8.8   CO2  --  22   BUN  --  28   CR  --  0.78   GLC  --  208*       Assessment:    Tolerating radiation therapy well.  All questions and concerns addressed.    Toxicities:  Fatigue: Grade 1: Fatigue relieved by rest  Nausea: Grade 1: Loss of appetite without alteration in eating habits  Pain: Grade 1: Mild pain  Dermatitis: Grade 0: No toxicity    Plan:   1. Continue current therapy.    2. Urinary incontinence/back pain: check UA/UC  3. Dysphagia, loss of appetite: she did have SLP consult as in inpatient and she should be able to tolerate regular diet with thin liquids.  Will arrange dietician consultation.    4. Repeat brain MRI at the end of of 3500 cGy.  Consider boost.       Mosaiq chart and setup information reviewed  Ports checked         Educational Topic Discussed  Additional Instructions: Will get urine sample today.   Education Instructions: reviewed        Chin Simmons MD/PhD  [phone] clinic  Pager [phone]    Please do not send letter to referring physician.      Addendum:  UA positive. Spoke to daughter Eugenie Gavin.  Abx sent to [facility].      Chin Simmons MD      Again, thank you for allowing me to participate in the care of your patient.      Sincerely,    Chin Simmons MD

## 2019-10-07 NOTE — LETTER
Date:October 8, 2019      Provider requested that no letter be sent. Do not send.       [facility] Health Information

## 2019-10-07 NOTE — PROGRESS NOTES
[facility] PHYSICIANS  SPECIALIZING IN BREAKTHROUGHS  Radiation Oncology    On Treatment Visit Note      Simran Negro      Date: 10/7/2019   MRN: [id number]   : 1950  Diagnosis: Brain mets      Reason for Visit:  On Radiation Treatment Visit     Treatment Summary to Date  Treatment Site: Whole brain Current Dose: 1250/3500 cGy Fractions:       Chemotherapy  Chemo concurrent with radx?: Yes    ED Visit/Hosiptal Admission: None    Treatment Breaks: None      Subjective:   Simran is seen with bennie  today.  She denies any scalp irritation.  Has not noted hair loss.  Her headache is very mild and she has no nausea.  Her biggest complaint is lack of appetite.  She also reports a lump sensation in the throat and she vomits up food when she eats.  She also reports vaginal discharge and urinary incontinence since the Caicedo was removed.  She has low back pain but no fevers or chills.      Nursing ROS:   Nutrition Alteration  Diet Type: Patient's Preference  Nutrition Note: appetite low  Skin  Skin Reaction: 0 - No changes     ENT and Mouth Exam  ENT/Mouth Note: Pt states some diff. with swallowing, feelis like something is in her throat and causes her to vomit. Denies pain and nausea.     Gastrointestinal  Nausea: 0 - None  Diarrhea: 0 - None  Constipation NCI: 1 - Occasional or intermittent s/sx  Genitourinary   Note: having dischsrge since cath pulled in the hospital.   Psychosocial  Pyschosocial Note: Feeling very tired  Pain Assessment  0-10 Pain Scale: 0      Objective:   /78   Pulse 91   Wt 56.5 kg (124 lb 8 oz)   SpO2 98%   BMI 25.15 kg/m    Gen: Appears well, in no acute distress  Skin: No erythema    Labs:  CBC RESULTS:   Recent Labs   Lab Test 10/04/19  0730   WBC 10.8   RBC 4.68   HGB 13.2   HCT 38.7   MCV 83   MCH 28.2   MCHC 34.1   RDW 13.4        ELECTROLYTES:  Recent Labs   Lab Test 10/04/19  1108 10/04/19  0730   NA  --  134   POTASSIUM 3.8 4.9   CHLORIDE  --   103   EMMA  --  8.8   CO2  --  22   BUN  --  28   CR  --  0.78   GLC  --  208*       Assessment:    Tolerating radiation therapy well.  All questions and concerns addressed.    Toxicities:  Fatigue: Grade 1: Fatigue relieved by rest  Nausea: Grade 1: Loss of appetite without alteration in eating habits  Pain: Grade 1: Mild pain  Dermatitis: Grade 0: No toxicity    Plan:   1. Continue current therapy.    2. Urinary incontinence/back pain: check UA/UC  3. Dysphagia, loss of appetite: she did have SLP consult as in inpatient and she should be able to tolerate regular diet with thin liquids.  Will arrange dietician consultation.    4. Repeat brain MRI at the end of of 3500 cGy.  Consider boost.       Mosaiq chart and setup information reviewed  Ports checked         Educational Topic Discussed  Additional Instructions: Will get urine sample today.   Education Instructions: reviewed        Chin Simmons MD/PhD  [phone] clinic  Pager [phone]    Please do not send letter to referring physician.      Addendum:  UA positive. Spoke to daughter Eugenie Gavin.  Abx sent to [facility].      Chin Simmons MD

## 2019-10-08 ENCOUNTER — APPOINTMENT (OUTPATIENT)
Dept: RADIATION ONCOLOGY | Facility: CLINIC | Age: 69
End: 2019-10-08
Attending: RADIOLOGY
Payer: COMMERCIAL

## 2019-10-08 PROCEDURE — 77417 THER RADIOLOGY PORT IMAGE(S): CPT | Performed by: RADIOLOGY

## 2019-10-08 PROCEDURE — 77412 RADIATION TX DELIVERY LVL 3: CPT | Performed by: RADIOLOGY

## 2019-10-09 ENCOUNTER — OFFICE VISIT (OUTPATIENT)
Dept: INTERPRETER SERVICES | Facility: CLINIC | Age: 69
End: 2019-10-09
Payer: COMMERCIAL

## 2019-10-09 ENCOUNTER — APPOINTMENT (OUTPATIENT)
Dept: RADIATION ONCOLOGY | Facility: CLINIC | Age: 69
End: 2019-10-09
Attending: RADIOLOGY
Payer: COMMERCIAL

## 2019-10-09 PROCEDURE — 77412 RADIATION TX DELIVERY LVL 3: CPT | Performed by: RADIOLOGY

## 2019-10-09 PROCEDURE — T1013 SIGN LANG/ORAL INTERPRETER: HCPCS | Mod: U3,ZF

## 2019-10-10 ENCOUNTER — APPOINTMENT (OUTPATIENT)
Dept: RADIATION ONCOLOGY | Facility: CLINIC | Age: 69
End: 2019-10-10
Attending: RADIOLOGY
Payer: COMMERCIAL

## 2019-10-10 ENCOUNTER — OFFICE VISIT (OUTPATIENT)
Dept: NEUROSURGERY | Facility: CLINIC | Age: 69
End: 2019-10-10
Payer: COMMERCIAL

## 2019-10-10 VITALS
OXYGEN SATURATION: 98 % | RESPIRATION RATE: 16 BRPM | HEART RATE: 108 BPM | WEIGHT: 123.5 LBS | HEIGHT: 59 IN | SYSTOLIC BLOOD PRESSURE: 111 MMHG | BODY MASS INDEX: 24.9 KG/M2 | DIASTOLIC BLOOD PRESSURE: 79 MMHG

## 2019-10-10 DIAGNOSIS — G93.89 BRAIN MASS: Primary | ICD-10-CM

## 2019-10-10 LAB
BACTERIA SPEC CULT: ABNORMAL
BACTERIA SPEC CULT: ABNORMAL
Lab: ABNORMAL
SPECIMEN SOURCE: ABNORMAL

## 2019-10-10 PROCEDURE — 77412 RADIATION TX DELIVERY LVL 3: CPT | Performed by: RADIOLOGY

## 2019-10-10 ASSESSMENT — MIFFLIN-ST. JEOR: SCORE: 1000.82

## 2019-10-10 ASSESSMENT — PATIENT HEALTH QUESTIONNAIRE - PHQ9: SUM OF ALL RESPONSES TO PHQ QUESTIONS 1-9: 27

## 2019-10-10 ASSESSMENT — PAIN SCALES - GENERAL: PAINLEVEL: EXTREME PAIN (8)

## 2019-10-10 NOTE — PROGRESS NOTES
Depression Response    Patient completed the PHQ-9 assessment for depression and scored >9? Yes  Question 9 on the PHQ-9 was positive for suicidality? Yes  Is the patient already receiving treatment for depression? Yes  Patient would like to speak with behavioral health team ([facility] clinics only)? No    I personally notified the following: visit provider    Behavioral Health/Social Work Contact Information     [facility]  Andrew Ashton MA, LMFT  Lead Behavioral Health Clinician  Phone: [phone]  [facility] Pager: [phone]    Non-[facility] Clinics  [facility] On-Call   Pager: [phone]

## 2019-10-10 NOTE — PROGRESS NOTES
" [facility]:  PHQ-9 Screening Note  SITUATION/BACKGROUND                                                    Simran Negro is a 69 year old female who completed the PHQ-9 assessment for depression and Score is >9.    Onset of symptoms: gradual  Trigger: New diagnosis of brain mass being treated with radiation.  Recent related events: States treating brain mass with oncology and family always takes of me.  Prior history of suicide attempt or self harm: No I love my children to much, I would not do that  Risk Factors: None  History of depression or mental illness: Yes  Medications reviewed: NA     ASSESSMENT      A. Are any of the following present?      Suicidal thoughts with a plan and means to carry out the plan?    Intent to harm others    Altered mental status: confusion, delusional, psychotic NO - got to B   B. Are any of the following present?      Suicidal thoughts without a plan or means to carry out the plan    New onset of delusional ideas    Past inpatient admission for depression    New onset and recent change or addition of new medication NO - go to C   C. Are any of the following present?      Previous suicide attempts    Depression interfering with ability to work or function    Loss of appetite and eating poorly    Abrupt cessation of drugs (OTC or RX), alcohol or caffeine    Drug or alcohol abuse NO - go to D   D. Are several of the following present?      Difficulty concentrating    Difficulty sleeping    Reduced interest in sexual activity or impotency    Irregular or absent menstruation    No interest in activity    Change in interpersonal relationships    Increased use/abuse of alcohol or drugs    Pregnant or recent child birth    Recent major life change    History of depression NO - provide home care instructions         PLAN      Home Care Instructions:   Patient states has a very nice PCP and very \"blessed \" to have that doctor.   Name :  Ari Flor MD     Report the " following to your PCP:   Inability to eat due to oncology /radiation treatment   Patient appointment in Neurosurgery was not needed, no biopsy completed, patient did not like this and felt very mad appointment was not canceled appropriately,  Patient states please do not do this again, making wrong or not needed appointments. Apologized numerous times and again said she was very angry appointment was not canceled and she is here today.  All information through .  My name and number given to patient.      Seek emergency care immediately if any of the following occur:   Pt is ongoing with radiation and unable to eat at this time, throat sore, no nauea, can not eat much    BEHAVIORAL HEALTH TEAMS      [facility]    [facility] Pager: [phone]    [place]  - Behavioral Health Team    Pager number: [phone]    Referral to Behavioral Health    BEHAVIORAL / SPIRITUAL HEALTH SOWQ [08048}    RESOURCES      - PCP    HINA MOLINA, ANYA        Copyright 2016 [id number]

## 2019-10-10 NOTE — NURSING NOTE
Chief Complaint   Patient presents with     RECHECK     [facility] RETURN 2 WEEK POST OP     Sharlene Reza CMA

## 2019-10-10 NOTE — LETTER
10/10/2019       RE: Simran Negro  [address]   [place] [address]     Dear Colleague,    Thank you for referring your patient, Simran Negro, to the [facility] NEUROSURGERY at [facility]. Please see a copy of my visit note below.    Depression Response    Patient completed the PHQ-9 assessment for depression and scored >9? Yes  Question 9 on the PHQ-9 was positive for suicidality? Yes  Is the patient already receiving treatment for depression? Yes  Patient would like to speak with behavioral health team ([facility] clinics only)? No    I personally notified the following: visit provider    Behavioral Health/Social Work Contact Information     [facility]  Andrew Ashton MA, LMFT  Lead Behavioral Health Clinician  Phone: [phone]  [facility] Pager: [phone]    Non-[facility] Clinics  [facility] On-Call   Pager: [phone]        [facility]:  PHQ-9 Screening Note  SITUATION/BACKGROUND                                                    Simran Negro is a 69 year old female who completed the PHQ-9 assessment for depression and Score is >9.    Onset of symptoms: gradual  Trigger: New diagnosis of brain mass being treated with radiation.  Recent related events: States treating brain mass with oncology and family always takes of me.  Prior history of suicide attempt or self harm: No I love my children to much, I would not do that  Risk Factors: None  History of depression or mental illness: Yes  Medications reviewed: NA     ASSESSMENT      A. Are any of the following present?      Suicidal thoughts with a plan and means to carry out the plan?    Intent to harm others    Altered mental status: confusion, delusional, psychotic NO - got to B   B. Are any of the following present?      Suicidal thoughts without a plan or means to carry out the plan    New onset of delusional ideas    Past inpatient admission for depression    New onset and recent change or addition of new  "medication NO - go to C   C. Are any of the following present?      Previous suicide attempts    Depression interfering with ability to work or function    Loss of appetite and eating poorly    Abrupt cessation of drugs (OTC or RX), alcohol or caffeine    Drug or alcohol abuse NO - go to D   D. Are several of the following present?      Difficulty concentrating    Difficulty sleeping    Reduced interest in sexual activity or impotency    Irregular or absent menstruation    No interest in activity    Change in interpersonal relationships    Increased use/abuse of alcohol or drugs    Pregnant or recent child birth    Recent major life change    History of depression NO - provide home care instructions         PLAN      Home Care Instructions:   Patient states has a very nice PCP and very \"blessed \" to have that doctor.   Name :  Ari Flor MD     Report the following to your PCP:   Inability to eat due to oncology /radiation treatment   Patient appointment in Neurosurgery was not needed, no biopsy completed, patient did not like this and felt very mad appointment was not canceled appropriately,  Patient states please do not do this again, making wrong or not needed appointments. Apologized numerous times and again said she was very angry appointment was not canceled and she is here today.  All information through .  My name and number given to patient.      Seek emergency care immediately if any of the following occur:   Pt is ongoing with radiation and unable to eat at this time, throat sore, no nauea, can not eat much    BEHAVIORAL HEALTH TEAMS      [facility] [place]    [facility] Pager: [phone]    [place]  - Behavioral Health Team    Pager number: [phone]    Referral to Behavioral Health    BEHAVIORAL / SPIRITUAL HEALTH SOWQ [34952}    RESOURCES      - PCP    HINA MOLINA RN        Copyright 2016 [employer]                 Again, thank you for allowing me to participate " in the care of your patient.      Sincerely,    Neurosurgery Nurse

## 2019-10-11 ENCOUNTER — APPOINTMENT (OUTPATIENT)
Dept: RADIATION ONCOLOGY | Facility: CLINIC | Age: 69
End: 2019-10-11
Attending: RADIOLOGY
Payer: COMMERCIAL

## 2019-10-11 PROCEDURE — 77412 RADIATION TX DELIVERY LVL 3: CPT | Performed by: RADIOLOGY

## 2019-10-14 ENCOUNTER — OFFICE VISIT (OUTPATIENT)
Dept: RADIATION ONCOLOGY | Facility: CLINIC | Age: 69
End: 2019-10-14
Attending: RADIOLOGY
Payer: COMMERCIAL

## 2019-10-14 VITALS
DIASTOLIC BLOOD PRESSURE: 77 MMHG | OXYGEN SATURATION: 99 % | BODY MASS INDEX: 25.45 KG/M2 | WEIGHT: 126 LBS | HEART RATE: 82 BPM | SYSTOLIC BLOOD PRESSURE: 115 MMHG

## 2019-10-14 DIAGNOSIS — N39.0 URINARY TRACT INFECTION ASSOCIATED WITH INDWELLING URETHRAL CATHETER, SUBSEQUENT ENCOUNTER: ICD-10-CM

## 2019-10-14 DIAGNOSIS — C83.390 CNS LYMPHOMA: Primary | ICD-10-CM

## 2019-10-14 DIAGNOSIS — T83.511D URINARY TRACT INFECTION ASSOCIATED WITH INDWELLING URETHRAL CATHETER, SUBSEQUENT ENCOUNTER: ICD-10-CM

## 2019-10-14 LAB
ALBUMIN UR-MCNC: 10 MG/DL
APPEARANCE UR: ABNORMAL
BILIRUB UR QL STRIP: NEGATIVE
COLOR UR AUTO: YELLOW
GLUCOSE UR STRIP-MCNC: 300 MG/DL
HGB UR QL STRIP: ABNORMAL
KETONES UR STRIP-MCNC: NEGATIVE MG/DL
LEUKOCYTE ESTERASE UR QL STRIP: ABNORMAL
MUCOUS THREADS #/AREA URNS LPF: PRESENT /LPF
NITRATE UR QL: POSITIVE
PH UR STRIP: 6 PH (ref 5–7)
RBC #/AREA URNS AUTO: 7 /HPF (ref 0–2)
SOURCE: ABNORMAL
SP GR UR STRIP: 1.01 (ref 1–1.03)
SQUAMOUS #/AREA URNS AUTO: 1 /HPF (ref 0–1)
TRANS CELLS #/AREA URNS HPF: 1 /HPF (ref 0–1)
UROBILINOGEN UR STRIP-MCNC: NORMAL MG/DL (ref 0–2)
WBC #/AREA URNS AUTO: >182 /HPF (ref 0–5)
WBC CLUMPS #/AREA URNS HPF: PRESENT /HPF

## 2019-10-14 PROCEDURE — T1013 SIGN LANG/ORAL INTERPRETER: HCPCS | Mod: U3,ZF

## 2019-10-14 PROCEDURE — 77336 RADIATION PHYSICS CONSULT: CPT | Performed by: RADIOLOGY

## 2019-10-14 PROCEDURE — 81001 URINALYSIS AUTO W/SCOPE: CPT | Performed by: RADIOLOGY

## 2019-10-14 PROCEDURE — 77412 RADIATION TX DELIVERY LVL 3: CPT | Performed by: RADIOLOGY

## 2019-10-14 RX ORDER — CIPROFLOXACIN 500 MG/1
500 TABLET, FILM COATED ORAL 2 TIMES DAILY
Qty: 14 TABLET | Refills: 0 | Status: SHIPPED | OUTPATIENT
Start: 2019-10-14 | End: 2019-10-18

## 2019-10-14 NOTE — PROGRESS NOTES
"[facility]  SPECIALIZING IN BREAKTHROUGHS  Radiation Oncology    On Treatment Visit Note      Simran Negro      Date: 10/14/2019   MRN: [id number]   : 1950  Diagnosis: Brain mets      Reason for Visit:  On Radiation Treatment Visit     Treatment Summary to Date  Treatment Site: Whole brain Current Dose: 2500/3500 cGy Fractions: 10/14      Chemotherapy  Chemo concurrent with radx?: Yes    ED Visit/Hosiptal Admission: None    Treatment Breaks: None.    Subjective:   Simran is seen today with a [facility] .  She missed her Rituximab infusion last Friday.  She thought the appointment was on Thursday and was told there was no appointment.  She states that she continues to have poor appetite and not eating well.  She has some abdominal pain.  She also has worsening headache and sees floaters and a line out of the left eye.  She takes Tylenol #3 for pain control.  She finished antibiotics for bladder infection but states that she continues to have incontinence and dysuria.  She has noted some hair loss.     Nursing ROS:   Nutrition Alteration  Diet Type: Patient's Preference  Nutrition Note: appetite low  Skin  Skin Reaction: 0 - No changes  CNS Alteration  CNS note: Pt states she has started to see \"floaters or a string in the Lt eye\"  ENT and Mouth Exam  ENT/Mouth Note: Pt states some diff. with swallowing, feelis like something is in her throat and causes her to vomit. Denies pain and nausea.     Gastrointestinal  Nausea: 0 - None  Diarrhea: 0 - None  Constipation NCI: 1 - Occasional or intermittent s/sx  Genitourinary   Note: Pt started the antibiotic last week for UTI  Psychosocial  Pyschosocial Note: Feeling very tired  Pain Assessment  Pain Note: see above      Objective:   /77   Pulse 82   Wt 57.2 kg (126 lb)   SpO2 99%   BMI 25.45 kg/m    Gen: Appears well, in no acute distress  Skin: Mild diffuse erythema over treatment field    Labs:  CBC RESULTS:   Recent Labs   Lab " Test 10/04/19  0730   WBC 10.8   RBC 4.68   HGB 13.2   HCT 38.7   MCV 83   MCH 28.2   MCHC 34.1   RDW 13.4        ELECTROLYTES:  Recent Labs   Lab Test 10/04/19  1108 10/04/19  0730   NA  --  134   POTASSIUM 3.8 4.9   CHLORIDE  --  103   EMMA  --  8.8   CO2  --  22   BUN  --  28   CR  --  0.78   GLC  --  208*       Assessment:    Tolerating radiation therapy well.  All questions and concerns addressed.    Toxicities:  Alopecia: Grade 1: Hair loss <50% of normal; not obvious from a distance; does not require a wig or hair peice to camoflage  Fatigue: Grade 1: Fatigue relieved by rest  Headache: Grade 1: Mild pain  Dermatitis: Grade 1: Faint erythema or dry desquamation    Plan:   1. Continue current therapy.    2. She has 4 more scheduled treatments for whole brain.  On Friday, she is to undergo repeat brain MRI.  Will decide if to give additional boost to the residual lesion.  3. Repeat UA given persistent symptoms.  4. Went over Friday's scheduled in detail, from lab to seeing Ms. Thomson to infusion to radiation to brain MRI with her daughter Eugenie on the phone.  A hard copy was provided with appointments highlighted.  An  will be requested who can be with her for all of these appointments.       Mosaiq chart and setup information reviewed  Ports checked    Medication Review  Med list reviewed with patient?: Yes    Educational Topic Discussed  Education Instructions: reviewed        Chin Simmons MD/PhD  [phone] clinic  Pager [phone]    Please do not send letter to referring physician.        ADDENDUM:  UA again indicated ongoing infection.  Based on sensitivity test done last week, will prescribe 7 days of ciprofloxacin.  Will follow culture and sensitivity.

## 2019-10-14 NOTE — LETTER
Date:October 16, 2019      Provider requested that no letter be sent. Do not send.       [facility] Health Information

## 2019-10-14 NOTE — LETTER
"10/14/2019       RE: Simran Negro  [address]   [place] [address]     Dear Colleague,    Thank you for referring your patient, Simran Negro, to the RADIATION ONCOLOGY CLINIC. Please see a copy of my visit note below.    [facility]YS[facility]  SPECIALIZING IN BREAKTHROUGHS  Radiation Oncology    On Treatment Visit Note      Simran Negro      Date: 10/14/2019   MRN: [id number]   : 1950  Diagnosis: Brain mets      Reason for Visit:  On Radiation Treatment Visit     Treatment Summary to Date  Treatment Site: Whole brain Current Dose: 2500/3500 cGy Fractions: 10/14      Chemotherapy  Chemo concurrent with radx?: Yes    ED Visit/Hosiptal Admission: None    Treatment Breaks: None.    Subjective:   Simran is seen today with a [facility] .  She missed her Rituximab infusion last Friday.  She thought the appointment was on Thursday and was told there was no appointment.  She states that she continues to have poor appetite and not eating well.  She has some abdominal pain.  She also has worsening headache and sees floaters and a line out of the left eye.  She takes Tylenol #3 for pain control.  She finished antibiotics for bladder infection but states that she continues to have incontinence and dysuria.  She has noted some hair loss.     Nursing ROS:   Nutrition Alteration  Diet Type: Patient's Preference  Nutrition Note: appetite low  Skin  Skin Reaction: 0 - No changes  CNS Alteration  CNS note: Pt states she has started to see \"floaters or a string in the Lt eye\"  ENT and Mouth Exam  ENT/Mouth Note: Pt states some diff. with swallowing, feelis like something is in her throat and causes her to vomit. Denies pain and nausea.     Gastrointestinal  Nausea: 0 - None  Diarrhea: 0 - None  Constipation NCI: 1 - Occasional or intermittent s/sx  Genitourinary   Note: Pt started the antibiotic last week for UTI  Psychosocial  Pyschosocial Note: Feeling very tired  Pain Assessment  Pain Note: see " above      Objective:   /77   Pulse 82   Wt 57.2 kg (126 lb)   SpO2 99%   BMI 25.45 kg/m     Gen: Appears well, in no acute distress  Skin: Mild diffuse erythema over treatment field    Labs:  CBC RESULTS:   Recent Labs   Lab Test 10/04/19  0730   WBC 10.8   RBC 4.68   HGB 13.2   HCT 38.7   MCV 83   MCH 28.2   MCHC 34.1   RDW 13.4        ELECTROLYTES:  Recent Labs   Lab Test 10/04/19  1108 10/04/19  0730   NA  --  134   POTASSIUM 3.8 4.9   CHLORIDE  --  103   EMMA  --  8.8   CO2  --  22   BUN  --  28   CR  --  0.78   GLC  --  208*       Assessment:    Tolerating radiation therapy well.  All questions and concerns addressed.    Toxicities:  Alopecia: Grade 1: Hair loss <50% of normal; not obvious from a distance; does not require a wig or hair peice to camoflage  Fatigue: Grade 1: Fatigue relieved by rest  Headache: Grade 1: Mild pain  Dermatitis: Grade 1: Faint erythema or dry desquamation    Plan:   1. Continue current therapy.    2. She has 4 more scheduled treatments for whole brain.  On Friday, she is to undergo repeat brain MRI.  Will decide if to give additional boost to the residual lesion.  3. Repeat UA given persistent symptoms.  4. Went over Friday's scheduled in detail, from lab to seeing Ms. Thomson to infusion to radiation to brain MRI with her daughter Eugenie on the phone.  A hard copy was provided with appointments highlighted.  An  will be requested who can be with her for all of these appointments.       Mosaiq chart and setup information reviewed  Ports checked    Medication Review  Med list reviewed with patient?: Yes    Educational Topic Discussed  Education Instructions: reviewed        Chin Simmons MD/PhD  [phone] clinic  Pager [phone]    Please do not send letter to referring physician.        ADDENDUM:  UA again indicated ongoing infection.  Based on sensitivity test done last week, will prescribe 7 days of ciprofloxacin.  Will follow culture and  sensitivity.      Again, thank you for allowing me to participate in the care of your patient.      Sincerely,    Chin Simmons MD

## 2019-10-14 NOTE — NURSING NOTE
10/14/19 1600 Pt daughter Eugenie called and was given information about pt UTI and the antibiotic to be picked up.  Pt daughter says she should be able to start the medication tomorrow.

## 2019-10-14 NOTE — NURSING NOTE
Falls Risk Screening Questions - Weekly OTV    1. Have you fallen in the past one week?  No.  2. Have you felt unsteady when walking or standing in the past one week?  No.

## 2019-10-15 ENCOUNTER — APPOINTMENT (OUTPATIENT)
Dept: RADIATION ONCOLOGY | Facility: CLINIC | Age: 69
End: 2019-10-15
Attending: RADIOLOGY
Payer: COMMERCIAL

## 2019-10-15 PROCEDURE — 77412 RADIATION TX DELIVERY LVL 3: CPT | Performed by: RADIOLOGY

## 2019-10-15 PROCEDURE — 77417 THER RADIOLOGY PORT IMAGE(S): CPT | Performed by: RADIOLOGY

## 2019-10-16 ENCOUNTER — APPOINTMENT (OUTPATIENT)
Dept: RADIATION ONCOLOGY | Facility: CLINIC | Age: 69
End: 2019-10-16
Attending: RADIOLOGY
Payer: COMMERCIAL

## 2019-10-16 PROCEDURE — 77412 RADIATION TX DELIVERY LVL 3: CPT | Performed by: RADIOLOGY

## 2019-10-17 ENCOUNTER — APPOINTMENT (OUTPATIENT)
Dept: RADIATION ONCOLOGY | Facility: CLINIC | Age: 69
End: 2019-10-17
Attending: RADIOLOGY
Payer: COMMERCIAL

## 2019-10-17 PROCEDURE — 77412 RADIATION TX DELIVERY LVL 3: CPT | Performed by: RADIOLOGY

## 2019-10-18 ENCOUNTER — INFUSION THERAPY VISIT (OUTPATIENT)
Dept: ONCOLOGY | Facility: CLINIC | Age: 69
End: 2019-10-18
Attending: INTERNAL MEDICINE
Payer: COMMERCIAL

## 2019-10-18 ENCOUNTER — APPOINTMENT (OUTPATIENT)
Dept: RADIATION ONCOLOGY | Facility: CLINIC | Age: 69
End: 2019-10-18
Attending: RADIOLOGY
Payer: COMMERCIAL

## 2019-10-18 ENCOUNTER — ONCOLOGY VISIT (OUTPATIENT)
Dept: ONCOLOGY | Facility: CLINIC | Age: 69
End: 2019-10-18
Attending: PHYSICIAN ASSISTANT
Payer: COMMERCIAL

## 2019-10-18 ENCOUNTER — APPOINTMENT (OUTPATIENT)
Dept: LAB | Facility: CLINIC | Age: 69
End: 2019-10-18
Attending: INTERNAL MEDICINE
Payer: COMMERCIAL

## 2019-10-18 ENCOUNTER — HOSPITAL ENCOUNTER (OUTPATIENT)
Dept: MRI IMAGING | Facility: CLINIC | Age: 69
Discharge: HOME | End: 2019-10-18
Attending: RADIOLOGY | Admitting: PHYSICIAN ASSISTANT
Payer: COMMERCIAL

## 2019-10-18 VITALS
SYSTOLIC BLOOD PRESSURE: 109 MMHG | DIASTOLIC BLOOD PRESSURE: 66 MMHG | OXYGEN SATURATION: 99 % | WEIGHT: 125.5 LBS | BODY MASS INDEX: 25.35 KG/M2 | HEART RATE: 76 BPM | TEMPERATURE: 99 F | RESPIRATION RATE: 16 BRPM

## 2019-10-18 DIAGNOSIS — N30.90 BLADDER INFECTION: ICD-10-CM

## 2019-10-18 DIAGNOSIS — C83.390 CNS LYMPHOMA: ICD-10-CM

## 2019-10-18 DIAGNOSIS — N39.0 URINARY TRACT INFECTION ASSOCIATED WITH INDWELLING URETHRAL CATHETER, SUBSEQUENT ENCOUNTER: ICD-10-CM

## 2019-10-18 DIAGNOSIS — N30.00 ACUTE CYSTITIS WITHOUT HEMATURIA: Primary | ICD-10-CM

## 2019-10-18 DIAGNOSIS — C83.390 CNS LYMPHOMA: Primary | ICD-10-CM

## 2019-10-18 DIAGNOSIS — T83.511D URINARY TRACT INFECTION ASSOCIATED WITH INDWELLING URETHRAL CATHETER, SUBSEQUENT ENCOUNTER: ICD-10-CM

## 2019-10-18 LAB
ALBUMIN SERPL-MCNC: 3.2 G/DL (ref 3.4–5)
ALBUMIN UR-MCNC: NEGATIVE MG/DL
ALP SERPL-CCNC: 94 U/L (ref 40–150)
ALT SERPL W P-5'-P-CCNC: 58 U/L (ref 0–50)
ANION GAP SERPL CALCULATED.3IONS-SCNC: 6 MMOL/L (ref 3–14)
APPEARANCE UR: ABNORMAL
AST SERPL W P-5'-P-CCNC: 20 U/L (ref 0–45)
BACTERIA #/AREA URNS HPF: ABNORMAL /HPF
BASOPHILS # BLD AUTO: 0 10E9/L (ref 0–0.2)
BASOPHILS NFR BLD AUTO: 0.3 %
BILIRUB SERPL-MCNC: 1.3 MG/DL (ref 0.2–1.3)
BILIRUB UR QL STRIP: NEGATIVE
BUN SERPL-MCNC: 9 MG/DL (ref 7–30)
CALCIUM SERPL-MCNC: 8.7 MG/DL (ref 8.5–10.1)
CHLORIDE SERPL-SCNC: 109 MMOL/L (ref 94–109)
CO2 SERPL-SCNC: 28 MMOL/L (ref 20–32)
COLOR UR AUTO: YELLOW
CREAT SERPL-MCNC: 0.8 MG/DL (ref 0.52–1.04)
DIFFERENTIAL METHOD BLD: ABNORMAL
EOSINOPHIL # BLD AUTO: 0.1 10E9/L (ref 0–0.7)
EOSINOPHIL NFR BLD AUTO: 2.2 %
ERYTHROCYTE [DISTWIDTH] IN BLOOD BY AUTOMATED COUNT: 13.5 % (ref 10–15)
GFR SERPL CREATININE-BSD FRML MDRD: 76 ML/MIN/{1.73_M2}
GLUCOSE SERPL-MCNC: 130 MG/DL (ref 70–99)
GLUCOSE UR STRIP-MCNC: NEGATIVE MG/DL
HCT VFR BLD AUTO: 36.1 % (ref 35–47)
HGB BLD-MCNC: 11.5 G/DL (ref 11.7–15.7)
HGB UR QL STRIP: NEGATIVE
IMM GRANULOCYTES # BLD: 0 10E9/L (ref 0–0.4)
IMM GRANULOCYTES NFR BLD: 0.3 %
KETONES UR STRIP-MCNC: NEGATIVE MG/DL
LEUKOCYTE ESTERASE UR QL STRIP: ABNORMAL
LYMPHOCYTES # BLD AUTO: 0.6 10E9/L (ref 0.8–5.3)
LYMPHOCYTES NFR BLD AUTO: 16.7 %
MCH RBC QN AUTO: 27.6 PG (ref 26.5–33)
MCHC RBC AUTO-ENTMCNC: 31.9 G/DL (ref 31.5–36.5)
MCV RBC AUTO: 87 FL (ref 78–100)
MONOCYTES # BLD AUTO: 0.3 10E9/L (ref 0–1.3)
MONOCYTES NFR BLD AUTO: 7.8 %
NEUTROPHILS # BLD AUTO: 2.7 10E9/L (ref 1.6–8.3)
NEUTROPHILS NFR BLD AUTO: 72.7 %
NITRATE UR QL: POSITIVE
NRBC # BLD AUTO: 0 10*3/UL
NRBC BLD AUTO-RTO: 0 /100
PH UR STRIP: 8 PH (ref 5–7)
PLATELET # BLD AUTO: 142 10E9/L (ref 150–450)
POTASSIUM SERPL-SCNC: 3.8 MMOL/L (ref 3.4–5.3)
PROT SERPL-MCNC: 6.1 G/DL (ref 6.8–8.8)
RBC # BLD AUTO: 4.17 10E12/L (ref 3.8–5.2)
RBC #/AREA URNS AUTO: 1 /HPF (ref 0–2)
SODIUM SERPL-SCNC: 144 MMOL/L (ref 133–144)
SOURCE: ABNORMAL
SP GR UR STRIP: 1.01 (ref 1–1.03)
UROBILINOGEN UR STRIP-MCNC: 0 MG/DL (ref 0–2)
WBC # BLD AUTO: 3.7 10E9/L (ref 4–11)
WBC #/AREA URNS AUTO: >182 /HPF (ref 0–5)

## 2019-10-18 PROCEDURE — 85025 COMPLETE CBC W/AUTO DIFF WBC: CPT | Performed by: PHYSICIAN ASSISTANT

## 2019-10-18 PROCEDURE — 96415 CHEMO IV INFUSION ADDL HR: CPT

## 2019-10-18 PROCEDURE — 25800030 ZZH RX IP 258 OP 636: Mod: ZF | Performed by: PHYSICIAN ASSISTANT

## 2019-10-18 PROCEDURE — T1013 SIGN LANG/ORAL INTERPRETER: HCPCS | Mod: U3,ZF

## 2019-10-18 PROCEDURE — G0463 HOSPITAL OUTPT CLINIC VISIT: HCPCS | Mod: ZF

## 2019-10-18 PROCEDURE — 25500064 ZZH RX 255 OP 636: Performed by: RADIOLOGY

## 2019-10-18 PROCEDURE — 87086 URINE CULTURE/COLONY COUNT: CPT | Performed by: PHYSICIAN ASSISTANT

## 2019-10-18 PROCEDURE — 81001 URINALYSIS AUTO W/SCOPE: CPT | Performed by: PHYSICIAN ASSISTANT

## 2019-10-18 PROCEDURE — 80053 COMPREHEN METABOLIC PANEL: CPT | Performed by: PHYSICIAN ASSISTANT

## 2019-10-18 PROCEDURE — 77336 RADIATION PHYSICS CONSULT: CPT | Performed by: RADIOLOGY

## 2019-10-18 PROCEDURE — 87088 URINE BACTERIA CULTURE: CPT | Performed by: PHYSICIAN ASSISTANT

## 2019-10-18 PROCEDURE — 99214 OFFICE O/P EST MOD 30 MIN: CPT | Mod: ZP | Performed by: PHYSICIAN ASSISTANT

## 2019-10-18 PROCEDURE — 77412 RADIATION TX DELIVERY LVL 3: CPT | Performed by: RADIOLOGY

## 2019-10-18 PROCEDURE — 96413 CHEMO IV INFUSION 1 HR: CPT

## 2019-10-18 PROCEDURE — A9585 GADOBUTROL INJECTION: HCPCS | Performed by: RADIOLOGY

## 2019-10-18 PROCEDURE — 87186 SC STD MICRODIL/AGAR DIL: CPT | Performed by: PHYSICIAN ASSISTANT

## 2019-10-18 PROCEDURE — 25000128 H RX IP 250 OP 636: Mod: ZF | Performed by: PHYSICIAN ASSISTANT

## 2019-10-18 PROCEDURE — 25000132 ZZH RX MED GY IP 250 OP 250 PS 637: Mod: ZF | Performed by: PHYSICIAN ASSISTANT

## 2019-10-18 PROCEDURE — 70553 MRI BRAIN STEM W/O & W/DYE: CPT

## 2019-10-18 RX ORDER — CIPROFLOXACIN 500 MG/1
500 TABLET, FILM COATED ORAL DAILY
Qty: 3 TABLET | Refills: 0 | Status: SHIPPED | OUTPATIENT
Start: 2019-10-18 | End: 2021-02-10

## 2019-10-18 RX ORDER — ACETAMINOPHEN 325 MG/1
650 TABLET ORAL ONCE
Status: COMPLETED | OUTPATIENT
Start: 2019-10-18 | End: 2019-10-18

## 2019-10-18 RX ORDER — ALBUTEROL SULFATE 0.83 MG/ML
2.5 SOLUTION RESPIRATORY (INHALATION)
Status: CANCELLED | OUTPATIENT
Start: 2019-10-18

## 2019-10-18 RX ORDER — GADOBUTROL 604.72 MG/ML
7.5 INJECTION INTRAVENOUS ONCE
Status: COMPLETED | OUTPATIENT
Start: 2019-10-18 | End: 2019-10-18

## 2019-10-18 RX ORDER — MEPERIDINE HYDROCHLORIDE 25 MG/ML
25 INJECTION INTRAMUSCULAR; INTRAVENOUS; SUBCUTANEOUS EVERY 30 MIN PRN
Status: CANCELLED | OUTPATIENT
Start: 2019-10-18

## 2019-10-18 RX ORDER — DIPHENHYDRAMINE HYDROCHLORIDE 50 MG/ML
50 INJECTION INTRAMUSCULAR; INTRAVENOUS
Status: CANCELLED
Start: 2019-10-18

## 2019-10-18 RX ORDER — NALOXONE HYDROCHLORIDE 0.4 MG/ML
.1-.4 INJECTION, SOLUTION INTRAMUSCULAR; INTRAVENOUS; SUBCUTANEOUS
Status: CANCELLED | OUTPATIENT
Start: 2019-10-18

## 2019-10-18 RX ORDER — ALBUTEROL SULFATE 90 UG/1
1-2 AEROSOL, METERED RESPIRATORY (INHALATION)
Status: CANCELLED
Start: 2019-10-18

## 2019-10-18 RX ORDER — ACETAMINOPHEN 325 MG/1
650 TABLET ORAL ONCE
Status: CANCELLED
Start: 2019-10-18

## 2019-10-18 RX ORDER — DIPHENHYDRAMINE HCL 25 MG
50 CAPSULE ORAL ONCE
Status: CANCELLED
Start: 2019-10-18

## 2019-10-18 RX ORDER — EPINEPHRINE 1 MG/ML
0.3 INJECTION, SOLUTION INTRAMUSCULAR; SUBCUTANEOUS EVERY 5 MIN PRN
Status: CANCELLED | OUTPATIENT
Start: 2019-10-18

## 2019-10-18 RX ORDER — DIPHENHYDRAMINE HCL 25 MG
50 CAPSULE ORAL ONCE
Status: COMPLETED | OUTPATIENT
Start: 2019-10-18 | End: 2019-10-18

## 2019-10-18 RX ORDER — METHYLPREDNISOLONE SODIUM SUCCINATE 125 MG/2ML
125 INJECTION, POWDER, LYOPHILIZED, FOR SOLUTION INTRAMUSCULAR; INTRAVENOUS
Status: CANCELLED
Start: 2019-10-18

## 2019-10-18 RX ORDER — SODIUM CHLORIDE 9 MG/ML
1000 INJECTION, SOLUTION INTRAVENOUS CONTINUOUS PRN
Status: CANCELLED
Start: 2019-10-18

## 2019-10-18 RX ADMIN — RITUXIMAB 600 MG: 10 INJECTION, SOLUTION INTRAVENOUS at 09:04

## 2019-10-18 RX ADMIN — GADOBUTROL 5.5 ML: 604.72 INJECTION INTRAVENOUS at 15:05

## 2019-10-18 RX ADMIN — DIPHENHYDRAMINE HYDROCHLORIDE 50 MG: 25 CAPSULE ORAL at 08:36

## 2019-10-18 RX ADMIN — ACETAMINOPHEN 650 MG: 325 TABLET ORAL at 08:36

## 2019-10-18 ASSESSMENT — PAIN SCALES - GENERAL: PAINLEVEL: NO PAIN (0)

## 2019-10-18 NOTE — NURSING NOTE
Chief Complaint   Patient presents with     Blood Draw     labs drawn with IV start by rn.  vital signs taken.     Labs drawn with IV start by RN in lab.  Vital signs taken.  Pt checked in to next appointment.  Amira Ashby RN

## 2019-10-18 NOTE — PROGRESS NOTES
Infusion Nursing Note:  Simran Negro presents today for C3D1 Rituxan.    Patient seen by provider today: Yes: Viktoria ESPINOZA   present during visit today: Yes, Language: Hmong.     Note: Patient refused any intervention for pain today.  will be patient all the today for her appointment. Patient has a ride home. Instruction given to patient as per provider's instruction. Verbalized understanding. No new concerns made. Otherwise well.     Intravenous Access:  Peripheral IV placed.    Treatment Conditions:  Lab Results   Component Value Date    HGB 11.5 10/18/2019     Lab Results   Component Value Date    WBC 3.7 10/18/2019      Lab Results   Component Value Date    ANEU 2.7 10/18/2019     Lab Results   Component Value Date     10/18/2019      Lab Results   Component Value Date     10/18/2019                   Lab Results   Component Value Date    POTASSIUM 3.8 10/18/2019           Lab Results   Component Value Date    MAG 1.8 09/25/2019            Lab Results   Component Value Date    CR 0.80 10/18/2019                   Lab Results   Component Value Date    EMMA 8.7 10/18/2019                Lab Results   Component Value Date    BILITOTAL 1.3 10/18/2019           Lab Results   Component Value Date    ALBUMIN 3.2 10/18/2019                    Lab Results   Component Value Date    ALT 58 10/18/2019           Lab Results   Component Value Date    AST 20 10/18/2019       Results reviewed, labs MET treatment parameters, ok to proceed with treatment.    TORB: 10/18/19/0843H/Viktoria ESPINOZA/Daniela Casey RN/ Will send script for Ciprofloxacin for UTI and PO Tylenol for headache. To proceed with treatment as planned.      Post Infusion Assessment:  Patient tolerated infusion without incident.  Blood return noted pre and post infusion.  Site patent and intact, free from redness, edema or discomfort.  No evidence of extravasations.  Access kept for MRI today.       Discharge Plan:    Prescription refills given for Ciprofloxacin and Tylenol.  Patient declined prescription refills.  Discharge instructions reviewed with: Patient and .  Patient and/or family verbalized understanding of discharge instructions and all questions answered.  Copy of AVS reviewed with patient and/or family.  Patient will return 10/25/19 for next appointment.  Patient discharged in stable condition accompanied by: self and .  Departure Mode: Ambulatory.    ALBANIA SUNSHINE RN

## 2019-10-18 NOTE — PROGRESS NOTES
Oncology/Hematology Visit Note  Oct 18, 2019    Reason for Visit: follow up of relapsed CNS lymphoma    History of Present Illness: Simran Negro is a 69 year old female with relapsed CNS lymphoma. She was diagnosed with DLBCL of the cervix in 06/2007 and was treated with 6 cycles of R-CHOP.  She attained a CR, but in early 2014 developed difficulty concentrating, gait instability, and right-sided headaches with some blurry vision.  Workup revealed an intra-axial right parietal mass.  Stereotactic biopsy on 03/19/2014 which was diagnostic for diffuse large B-cell lymphoma, non-germinal center type.  Immunophenotypically this was similar to her previous diffuse large B-cell lymphoma, and this was presumed CNS relapse.  She was started on MT-R (methotrexate, temozolomide and rituximab) therapy in 03/2014.  Her course was complicated by line-related DVT, and she completed a course of enoxaparin for this.  She attained a complete clinical and radiographic response after completing the MT-R treatment regimen and EA consolidation in 8/2014.    A routine brain MRI on 9/19/19 revealed two new b/l frontal lobe lesions concerning for recurrent lymphoma. She was admitted for further workup and management. CT CAP completed, no e/o distant sites of disease. PET scan confirms brain lesions as only sites of disease. She underwent LP in attempt to diagnose the 2 brain lesions, unfortunately flow was negative. NSG was consulted to consideration of a brain biopsy, however, after extensive discussion the patient declined the procedure. Preferred to pursue modified treatment plan and whole brain radiation. She was started on C1D1 Rituxan with 5 days of dexamethasone 40mg daily on 9/26. Radiation Oncology was also consulted during admission. They are willing to offer whole brain radiation without a tissue diagnosis. Simulation was completed prior to discharge on 9/27.    She is s/p C2 Rituxan on 10/4 and was scheduled 10/11 for C3, but  missed her appointment. She presents today prior to cycle 3 Rituxan.    Interval History:  [place] [facility] [place] She states she has been having headaches which she feels is from radiation. She has been taking tylenol 3 for pain. She also has floaters in left eye but none in right. She continues to have numbness/tingling in hands and bilateral legs from knee to feet, but thinks this is improving. She denies any recent falls. She continues to feel sensation of liquids getting caught in her throat with drinking but denies any choking and rare cough. She denies any fevers. She saw Dr. Simmons on 10/14 and was prescribed Cipro for a UTI, but states the prescription was sent to [facility] pharmacy and when they tried to pick it up they were told that only [facility] providers could send medications to that pharmacy so she has not taken the medication. She continues to have dysuria but no hematuria. She has some abdominal pain that is diffuse in location. She denies any nausea, heartburn, diarrhea, constipation. Remaining 10 point ROS negative.    Current Outpatient Medications   Medication Sig Dispense Refill     Acetaminophen (TYLENOL PO) Take 325 mg by mouth       acetaminophen-codeine (TYLENOL #3) 300-30 MG tablet Take 1-2 tablets by mouth every 4 hours as needed for severe pain       allopurinol (ZYLOPRIM) 300 MG tablet Take 1 tablet (300 mg) by mouth daily 30 tablet 0     ammonium lactate (AMLACTIN) 12 % cream Apply two to three times a day as needed.       aspirin 81 MG EC tablet Take 1 tablet (81 mg) by mouth daily       calcium carbonate-vitamin D (OYSTER SHELL CALCIUM/D) 500-200 MG-UNIT tablet Take 1 tablet by mouth 2 times daily       cetirizine (ZYRTEC) 10 MG tablet Take 1 tablet (10 mg) by mouth daily as needed for allergies       ciprofloxacin (CIPRO) 500 MG tablet Take 1 tablet (500 mg) by mouth 2 times daily for 7 days 14 tablet 0     CYANOCOBALAMIN PO Take 500 mcg by mouth daily        fluticasone (FLONASE) 50 MCG/ACT nasal spray Spray 2 sprays in nostril daily as needed for rhinitis or allergies       magnesium gluconate (MAGONATE) 500 MG tablet Take 500 mg by mouth       Multiple Vitamin (MULTI-VITAMINS) TABS        omeprazole (PRILOSEC) 20 MG DR capsule Take 1 capsule (20 mg) by mouth daily       ORDER FOR DME Please dispense to take 3 Ensure/day. 90 Units 3     polyethylene glycol (MIRALAX/GLYCOLAX) packet Take 17 g by mouth daily Hold for loose stools 30 packet 0     ranitidine (ZANTAC) 150 MG tablet Take 1 tablet (150 mg) by mouth 2 times daily       senna-docusate (SENOKOT-S/PERICOLACE) 8.6-50 MG tablet Take 1 tablet by mouth 2 times daily as needed for constipation 60 tablet 0     simethicone (MYLICON) 80 MG chewable tablet Take 1 tablet (80 mg) by mouth every 6 hours as needed for cramping 30 tablet 0     sulfamethoxazole-trimethoprim (BACTRIM DS/SEPTRA DS) 800-160 MG tablet Take 1 tablet by mouth 2 times daily 6 tablet 0     zolpidem (AMBIEN) 5 MG tablet Take 1 tablet (5 mg) by mouth nightly as needed for sleep         Physical Examination:  General: The patient is a pleasant female in no acute distress.  /66 (BP Location: Right arm, Patient Position: Sitting, Cuff Size: Adult Small)   Pulse 76   Temp 99  F (37.2  C) (Oral)   Resp 16   Wt 56.9 kg (125 lb 8 oz)   SpO2 99%   BMI 25.35 kg/m    Wt Readings from Last 10 Encounters:   10/18/19 56.9 kg (125 lb 8 oz)   10/14/19 57.2 kg (126 lb)   10/10/19 56 kg (123 lb 8 oz)   10/07/19 56.5 kg (124 lb 8 oz)   10/04/19 56.7 kg (125 lb)   09/27/19 59.4 kg (130 lb 15.3 oz)   09/09/19 58.7 kg (129 lb 6.6 oz)   03/04/19 61.6 kg (135 lb 12.8 oz)   08/31/18 62.5 kg (137 lb 11.2 oz)   02/19/18 61.2 kg (135 lb)     HEENT: EOMI, PERRL. Sclerae are anicteric. Oral mucosa is pink and moist with no lesions or thrush.   Lymph: Neck is supple with no lymphadenopathy in the cervical or supraclavicular areas.   Heart: Regular rate and rhythm.    Lungs: Clear to auscultation bilaterally.   Abdomen: Bowel sounds present, soft, diffusely tender but no rigidity. Nondistended.  Extremities: No lower extremity edema noted bilaterally.   Neuro: Cranial nerves II through XII are grossly intact. Strength is symmetric in lower extremities today.  Skin: No rashes, petechiae, or bruising noted on exposed skin.    Laboratory Data:  Results for RAKEL SANDERS (MRN [id number]) as of 10/18/2019 07:58   10/18/2019 07:18   Sodium 144   Potassium 3.8   Chloride 109   Carbon Dioxide 28   Urea Nitrogen 9   Creatinine 0.80   GFR Estimate 76   GFR Estimate If Black 88   Calcium 8.7   Anion Gap 6   Albumin 3.2 (L)   Protein Total 6.1 (L)   Bilirubin Total 1.3   Alkaline Phosphatase 94   ALT 58 (H)   AST 20   Glucose 130 (H)   WBC 3.7 (L)   Hemoglobin 11.5 (L)   Hematocrit 36.1   Platelet Count 142 (L)   RBC Count 4.17   MCV 87   MCH 27.6   MCHC 31.9   RDW 13.5   Diff Method Automated Method   % Neutrophils 72.7   % Lymphocytes 16.7   % Monocytes 7.8   % Eosinophils 2.2   % Basophils 0.3   % Immature Granulocytes 0.3   Nucleated RBCs 0   Absolute Neutrophil 2.7   Absolute Lymphocytes 0.6 (L)   Absolute Monocytes 0.3   Absolute Eosinophils 0.1   Absolute Basophils 0.0   Abs Immature Granulocytes 0.0   Absolute Nucleated RBC 0.0       Assessment and Plan:    H/o DLBCL with CNS relapse s/p MT-R with CR 2014  New frontal lobe masses  A routine brain MRI on 9/19/19 revealed two new b/l frontal lobe lesions, left sided lesion is 4.2x3.2x3.9cm in size and right sided lesion is 1.9x2.7x3.3cm, concerning for recurrent lymphoma. Reported fatigue and poor appetite as most significant symptoms over the last month. Sons endorsed she had been forgetful with slurred speech and unsteadiness when walking.  - Neurosurgery consulted for biopsy consideration. Prefer to pursue LP as initial step in diagnosis. Given negative CSF flow, NSG recommending brain biopsy, however, patient declined   - LP 9/23.  CSF flow negative. Cryptococcus, Toxoplasma negative. Cysticercus antibody IgG negative   - PET scan completed on 9/24, brain is only site of disease. 9/21 CT c/a/p showed no evidence of lymphoma    --Started on Rituxan + dex 40 mg x 5 days on 9/26. Missed week 3 on 10/11 due to misunderstanding of schewdule  --WBRT started on 10/2, scheduled through 10/18. Brain MRI today to determine if further radiation needed  --EBV PCR positive on 9/26, 10/4  --Will reschedule week 4 for 10/25   --Per Dr. Dailey, will need MRI brain 6-8 weeks after radiation complete then follow up with him.    UTI  UA on 10/14 positive but did not reflex to culture. Will check UA again today as she is still symptomatic. I called [facility] pharmacy. Cipro sent on 10/14 was never picked up. They also have no record of Bactrim being dispensed on 10/7. Pharmacist did confirm that only [facility] physicians can prescribe at [facility] pharmacy but that outside prescriptions can be filled if co-signed by a [facility] physician.   --UA positive today. UC pending  --Will send Cipro 500 daily x 3 to our clinic pharmacy while awaiting UC results.     Dysphagia  - SLP consulted during admission. Diet advanced to regular diet with thin liquids.     TLS, low risk currently  Uric acid 8.0 on admission. No other evidence of TLS. No e/o DIC on admission. Improved to 3.4 on 9/24. No acute issues during hospitalization.  - Continue allopurinol 300 mg daily.      GERD  - Continue Omeprazole and Ranitidine     Moderate malnutrition  Present on admission. Reports fatigue and poor appetite over the last month. Has lost about 15 lbs.  - Encourage small, frequent meals  - Supplements between meals     Viktoria Thomson PA-C  [facility]  [address] [place] [address] [phone]

## 2019-10-18 NOTE — LETTER
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 She was diagnosed with DLBCL of the cervix in 06/2007 and was treated with 6 cycles of R-CHOP.  She attained a CR, but in early 2014 developed difficulty concentrating, gait instability, and right-sided headaches with some blurry vision.  Workup revealed an intra-axial right parietal mass.  Stereotactic biopsy on 03/19/2014 which was diagnostic for diffuse large B-cell lymphoma, non-germinal center type.  Immunophenotypically this was similar to her previous diffuse large B-cell lymphoma, and this was presumed CNS relapse.  She was started on MT-R (methotrexate, temozolomide and rituximab) therapy in 03/2014.  Her course was complicated by line-related DVT, and she completed a course of enoxaparin for this.  She attained a complete clinical and radiographic response after completing the MT-R treatment regimen and EA consolidation in 8/2014.    A routine brain MRI on 9/19/19 revealed two new b/l frontal lobe lesions concerning for recurrent lymphoma. She was admitted for further workup and management. CT CAP completed, no e/o distant sites of disease. PET scan confirms brain lesions as only sites of disease. She underwent LP in attempt to diagnose the 2 brain lesions, unfortunately flow was negative. NSG was consulted to consideration of a brain biopsy, however, after extensive discussion the patient declined the procedure. Preferred to pursue modified treatment plan and whole brain radiation. She was started on C1D1 Rituxan with 5 days of dexamethasone 40mg daily on 9/26. Radiation Oncology was also consulted during admission. They are willing to offer whole brain radiation without a tissue diagnosis. Simulation was completed prior to discharge on 9/27.    She  is s/p C2 Rituxan on 10/4 and was scheduled 10/11 for C3, but missed her appointment. She presents today prior to cycle 3 Rituxan.    Interval History:  Simran is seen with [facility] . She states she has been having headaches which she feels is from radiation. She has been taking tylenol 3 for pain. She also has floaters in left eye but none in right. She continues to have numbness/tingling in hands and bilateral legs from knee to feet, but thinks this is improving. She denies any recent falls. She continues to feel sensation of liquids getting caught in her throat with drinking but denies any choking and rare cough. She denies any fevers. She saw Dr. Simmons on 10/14 and was prescribed Cipro for a UTI, but states the prescription was sent to [facility] pharmacy and when they tried to pick it up they were told that only [facility] providers could send medications to that pharmacy so she has not taken the medication. She continues to have dysuria but no hematuria. She has some abdominal pain that is diffuse in location. She denies any nausea, heartburn, diarrhea, constipation. Remaining 10 point ROS negative.    Current Outpatient Medications   Medication Sig Dispense Refill     Acetaminophen (TYLENOL PO) Take 325 mg by mouth       acetaminophen-codeine (TYLENOL #3) 300-30 MG tablet Take 1-2 tablets by mouth every 4 hours as needed for severe pain       allopurinol (ZYLOPRIM) 300 MG tablet Take 1 tablet (300 mg) by mouth daily 30 tablet 0     ammonium lactate (AMLACTIN) 12 % cream Apply two to three times a day as needed.       aspirin 81 MG EC tablet Take 1 tablet (81 mg) by mouth daily       calcium carbonate-vitamin D (OYSTER SHELL CALCIUM/D) 500-200 MG-UNIT tablet Take 1 tablet by mouth 2 times daily       cetirizine (ZYRTEC) 10 MG tablet Take 1 tablet (10 mg) by mouth daily as needed for allergies       ciprofloxacin (CIPRO) 500 MG tablet Take 1 tablet (500 mg) by mouth 2 times daily for 7 days 14 tablet 0      CYANOCOBALAMIN PO Take 500 mcg by mouth daily       fluticasone (FLONASE) 50 MCG/ACT nasal spray Spray 2 sprays in nostril daily as needed for rhinitis or allergies       magnesium gluconate (MAGONATE) 500 MG tablet Take 500 mg by mouth       Multiple Vitamin (MULTI-VITAMINS) TABS        omeprazole (PRILOSEC) 20 MG DR capsule Take 1 capsule (20 mg) by mouth daily       ORDER FOR DME Please dispense to take 3 Ensure/day. 90 Units 3     polyethylene glycol (MIRALAX/GLYCOLAX) packet Take 17 g by mouth daily Hold for loose stools 30 packet 0     ranitidine (ZANTAC) 150 MG tablet Take 1 tablet (150 mg) by mouth 2 times daily       senna-docusate (SENOKOT-S/PERICOLACE) 8.6-50 MG tablet Take 1 tablet by mouth 2 times daily as needed for constipation 60 tablet 0     simethicone (MYLICON) 80 MG chewable tablet Take 1 tablet (80 mg) by mouth every 6 hours as needed for cramping 30 tablet 0     sulfamethoxazole-trimethoprim (BACTRIM DS/SEPTRA DS) 800-160 MG tablet Take 1 tablet by mouth 2 times daily 6 tablet 0     zolpidem (AMBIEN) 5 MG tablet Take 1 tablet (5 mg) by mouth nightly as needed for sleep         Physical Examination:  General: The patient is a pleasant female in no acute distress.  /66 (BP Location: Right arm, Patient Position: Sitting, Cuff Size: Adult Small)   Pulse 76   Temp 99  F (37.2  C) (Oral)   Resp 16   Wt 56.9 kg (125 lb 8 oz)   SpO2 99%   BMI 25.35 kg/m     Wt Readings from Last 10 Encounters:   10/18/19 56.9 kg (125 lb 8 oz)   10/14/19 57.2 kg (126 lb)   10/10/19 56 kg (123 lb 8 oz)   10/07/19 56.5 kg (124 lb 8 oz)   10/04/19 56.7 kg (125 lb)   09/27/19 59.4 kg (130 lb 15.3 oz)   09/09/19 58.7 kg (129 lb 6.6 oz)   03/04/19 61.6 kg (135 lb 12.8 oz)   08/31/18 62.5 kg (137 lb 11.2 oz)   02/19/18 61.2 kg (135 lb)     HEENT: EOMI, PERRL. Sclerae are anicteric. Oral mucosa is pink and moist with no lesions or thrush.   Lymph: Neck is supple with no lymphadenopathy in the cervical or  supraclavicular areas.   Heart: Regular rate and rhythm.   Lungs: Clear to auscultation bilaterally.   Abdomen: Bowel sounds present, soft, diffusely tender but no rigidity. Nondistended.  Extremities: No lower extremity edema noted bilaterally.   Neuro: Cranial nerves II through XII are grossly intact. Strength is symmetric in lower extremities today.  Skin: No rashes, petechiae, or bruising noted on exposed skin.    Laboratory Data:  Results for RAKEL SANDERS (MRN [id number]) as of 10/18/2019 07:58   10/18/2019 07:18   Sodium 144   Potassium 3.8   Chloride 109   Carbon Dioxide 28   Urea Nitrogen 9   Creatinine 0.80   GFR Estimate 76   GFR Estimate If Black 88   Calcium 8.7   Anion Gap 6   Albumin 3.2 (L)   Protein Total 6.1 (L)   Bilirubin Total 1.3   Alkaline Phosphatase 94   ALT 58 (H)   AST 20   Glucose 130 (H)   WBC 3.7 (L)   Hemoglobin 11.5 (L)   Hematocrit 36.1   Platelet Count 142 (L)   RBC Count 4.17   MCV 87   MCH 27.6   MCHC 31.9   RDW 13.5   Diff Method Automated Method   % Neutrophils 72.7   % Lymphocytes 16.7   % Monocytes 7.8   % Eosinophils 2.2   % Basophils 0.3   % Immature Granulocytes 0.3   Nucleated RBCs 0   Absolute Neutrophil 2.7   Absolute Lymphocytes 0.6 (L)   Absolute Monocytes 0.3   Absolute Eosinophils 0.1   Absolute Basophils 0.0   Abs Immature Granulocytes 0.0   Absolute Nucleated RBC 0.0       Assessment and Plan:    H/o DLBCL with CNS relapse s/p MT-R with CR 2014  New frontal lobe masses  A routine brain MRI on 9/19/19 revealed two new b/l frontal lobe lesions, left sided lesion is 4.2x3.2x3.9cm in size and right sided lesion is 1.9x2.7x3.3cm, concerning for recurrent lymphoma. Reported fatigue and poor appetite as most significant symptoms over the last month. Sons endorsed she had been forgetful with slurred speech and unsteadiness when walking.  - Neurosurgery consulted for biopsy consideration. Prefer to pursue LP as initial step in diagnosis. Given negative CSF flow, NSG  recommending brain biopsy, however, patient declined   - LP 9/23. CSF flow negative. Cryptococcus, Toxoplasma negative. Cysticercus antibody IgG negative   - PET scan completed on 9/24, brain is only site of disease. 9/21 CT c/a/p showed no evidence of lymphoma    --Started on Rituxan + dex 40 mg x 5 days on 9/26. Missed week 3 on 10/11 due to misunderstanding of schewdule  --WBRT started on 10/2, scheduled through 10/18. Brain MRI today to determine if further radiation needed  --EBV PCR positive on 9/26, 10/4  --Will reschedule week 4 for 10/25   --Per Dr. Dailey, will need MRI brain 6-8 weeks after radiation complete then follow up with him.    UTI  UA on 10/14 positive but did not reflex to culture. Will check UA again today as she is still symptomatic. I called [facility] pharmacy. Cipro sent on 10/14 was never picked up. They also have no record of Bactrim being dispensed on 10/7. Pharmacist did confirm that only [facility] physicians can prescribe at [facility] pharmacy but that outside prescriptions can be filled if co-signed by a [facility] physician.   --UA positive today. UC pending  --Will send Cipro 500 daily x 3 to our clinic pharmacy while awaiting UC results.     Dysphagia  - SLP consulted during admission. Diet advanced to regular diet with thin liquids.     TLS, low risk currently  Uric acid 8.0 on admission. No other evidence of TLS. No e/o DIC on admission. Improved to 3.4 on 9/24. No acute issues during hospitalization.  - Continue allopurinol 300 mg daily.      GERD  - Continue Omeprazole and Ranitidine     Moderate malnutrition  Present on admission. Reports fatigue and poor appetite over the last month. Has lost about 15 lbs.  - Encourage small, frequent meals  - Supplements between meals     Viktoria Thomson PA-C  [facility]  [address] [place], [place] [id number] [phone]

## 2019-10-18 NOTE — NURSING NOTE
"Oncology Rooming Note    October 18, 2019 7:29 AM   Simran Negro is a 69 year old female who presents for:    Chief Complaint   Patient presents with     Blood Draw     labs drawn with IV start by rn.  vital signs taken.     Oncology Clinic Visit     Return; CNS Lymphoma, DLBCL     Initial Vitals: /66 (BP Location: Right arm, Patient Position: Sitting, Cuff Size: Adult Small)   Pulse 76   Temp 99  F (37.2  C) (Oral)   Resp 16   Wt 56.9 kg (125 lb 8 oz)   SpO2 99%   BMI 25.35 kg/m   Estimated body mass index is 25.35 kg/m  as calculated from the following:    Height as of 10/10/19: 1.499 m (4' 11\").    Weight as of this encounter: 56.9 kg (125 lb 8 oz). Body surface area is 1.54 meters squared.  No Pain (0) Comment: Data Unavailable   No LMP recorded. Patient is postmenopausal.  Allergies reviewed: Yes  Medications reviewed: Yes    Medications: Medication refills not needed today.  Pharmacy name entered into [employer]:    [facility] - [place], [place] - [address] [facility] (USE ONLY AT [facility]) - [place], [place] - [address] [place] [address]    Clinical concerns: Pt states she Has a list of concerns about how she feels.    Sarahi Jin [facility]              "

## 2019-10-18 NOTE — PATIENT INSTRUCTIONS
Contact Numbers  [facility]: [phone]    After Hours:  [phone]  Triage: [phone]    Please call the [facility] Triage line if you experience a temperature greater than or equal to 100.5, shaking chills, have uncontrolled nausea, vomiting and/or diarrhea, dizziness, shortness of breath, chest pain, bleeding, unexplained bruising, or if you have any other new/concerning symptoms, questions or concerns.     If it is after hours, weekends, or holidays, please call the main hospital  at  [phone] and ask to speak to the Oncology doctor on call.     If you are having any concerning symptoms or wish to speak to a provider before your next infusion visit, please call your care coordinator or triage to notify them so we can adequately serve you.     If you need a refill on a narcotic prescription or other medication, please call triage before your infusion appointment.         October 2019 Sunday Monday Tuesday Wednesday Thursday Friday Saturday             1    [place] OUTPATIENT   2:30 PM   (45 min.)   Ken Negro    Services Department    [facility] EXTERNAL RADIATION TREATMT   2:30 PM   (30 min.)   [facility] RAD ONC ELEKTA   Radiation Oncology Clinic 2    [place] OUTPATIENT   1:15 PM   (30 min.)   [facility] CONNECTION    Services Department    [facility] EXTERNAL RADIATION TREATMT   1:30 PM   (15 min.)   [facility] RAD ONC ELEKTA   Radiation Oncology Clinic 3    [place] OUTPATIENT   1:15 PM   (60 min.)   Simran Negro    Services Department    [facility] EXTERNAL RADIATION TREATMT   1:30 PM   (15 min.)   [facility] RAD ONC ELEKTA   Radiation Oncology Clinic 4    [facility] DRAW   7:00 AM   (15 min.)    [facility] DRAW   [facility] Lab Draw    [facility] RETURN   7:15 AM   (90 min.)   Viktoria Thomson PA   [facility] ONC INFUSION 360   8:30 AM   (360 min.)   [facility]   [facility] OUTPATIENT   1:15  PM   (30 min.)   [facility] CONNECTION    Services Department    [facility] EXTERNAL RADIATION TREATMT   1:30 PM   (15 min.)   [facility] RAD ONC ELEKTA   Radiation Oncology Clinic    PHONE   3:15 PM   (15 min.)   Simran Negro    Services Department 5       6     7    [facility] ON TREATMENT VISIT   9:00 AM   (60 min.)   Chin Simmons MD   Radiation Oncology Clinic    [facility] EXTERNAL RADIATION TREATMT   9:00 AM   (15 min.)   [facility] RAD ONC ELEKTA   Radiation Oncology Clinic 8    [place] OUTPATIENT   2:30 PM   (30 min.)   [place] [employer] CONNECTION    Services Department    [facility] EXTERNAL RADIATION TREATMT   2:30 PM   (15 min.)   [facility] RAD ONC ELEKTA   Radiation Oncology Clinic 9    [place] OUTPATIENT   1:45 PM   (45 min.)   Ken Negro    Services Department    [facility] EXTERNAL RADIATION TREATMT   2:00 PM   (15 min.)   P RAD ONC ELEKTA   Radiation Oncology Clinic    PHONE   4:00 PM   (15 min.)   Simran Negro    Services Department 10    [facility] NURSE VISIT  10:45 AM   (120 min.)   Nurse,  Neurosurgery   [facility] OUTPATIENT   1:45 PM   (45 min.)   [place] [facility] CONNECTION    Services Department    [facility] EXTERNAL RADIATION TREATMT   2:00 PM   (15 min.)   [facility] RAD ONC ELEKTA   Radiation Oncology Clinic 11    [facility] LAB DRAW   7:45 AM   (15 min.)    [facility] DRAW   [facility] Lab Draw    [facility] RETURN   8:05 AM   (90 min.)   Viktoria Thomson PA   [facility] ONC INFUSION 360   9:30 AM   (360 min.)    ONCOLOGY INFUSION   [facility]   2:00 PM   (15 min.)   P RAD ONC ELEKTA   Radiation Oncology Clinic    PHONE   3:00 PM   (15 min.)   Simran Negro    Services Department 12       13     14    [facility] ON TREATMENT VISIT  11:15 AM   (90 min.)   Chin Simmons MD   Radiation Oncology Clinic    [facility] EXTERNAL RADIATION TREATMT  11:15 AM   (15 min.)   P  RAD ONC ELEKTA   Radiation Oncology Clinic 15    [place] OUTPATIENT   2:00 PM   (30 min.)   [place] [employer] CONNE[facility]    Services Department    [facility] EXTERNAL RADIATION TREATMT   2:00 PM   (15 min.)   [facility] RAD ONC ELEKTA   Radiation Oncology Clinic 16    [place] OUTPATIENT   2:00 PM   (30 min.)   [place] [employer]    Services Department    [facility] EXTERNAL RADIATION TREATMT   2:00 PM   (15 min.)   [facility] RAD ONC ELEKTA   Radiation Oncology Clinic 17    [place] OUTPATIENT   2:00 PM   (30 min.)   [place] [employer] [facility]    Services Department    [facility] EXTERNAL RADIATION TREATMT   2:00 PM   (15 min.)   [facility] RAD ONC ELEKTA   Radiation Oncology Clinic 18    [facility] LAB DRAW   7:00 AM   (30 min.)    [facility] DRAW   [facility] Lab Draw    [facility] RETURN   7:15 AM   (75 min.)   Viktoria Thomson PA   [facility] ONC INFUSION 360   8:30 AM   (360 min.)   [facility] ON[facility]FUSION   [facility] EXTERNAL RADIATION TREATMT   2:00 PM   (15 min.)   [facility] RAD ONC ELEKTA   Radiation Oncology [facility] OUTPATIENT   2:30 PM   (30 min.)   Dee Dee Hernandez    Services Department    MR BRAIN WWO   2:30 PM   (60 min.)   [id number]   [facility] [place] MRI 19       20     21     22     23     24     25    [facility] LAB DRAW   8:30 AM   (15 min.)   [facility]   [facility] Lab Draw    [facility] RETURN   8:55 AM   (50 min.)   Viktoria Thomson PA   [facility] 26       27     28     29     30     31 November 2019 Sunday Monday Tuesday Wednesday Thursday Friday Saturday                            1     2       3     4     5     6     7     8     9       10     11     12     13     14     15     16       17     18     19     20     21     22     23       24     25     26     27     28     29     30                     Lab Results:  Recent Results (from the past  12 hour(s))   Comprehensive metabolic panel    Collection Time: 10/18/19  7:18 AM   Result Value Ref Range    Sodium 144 133 - 144 mmol/L    Potassium 3.8 3.4 - 5.3 mmol/L    Chloride 109 94 - 109 mmol/L    Carbon Dioxide 28 20 - 32 mmol/L    Anion Gap 6 3 - 14 mmol/L    Glucose 130 (H) 70 - 99 mg/dL    Urea Nitrogen 9 7 - 30 mg/dL    Creatinine 0.80 0.52 - 1.04 mg/dL    GFR Estimate 76 >60 mL/min/[1.73_m2]    GFR Estimate If Black 88 >60 mL/min/[1.73_m2]    Calcium 8.7 8.5 - 10.1 mg/dL    Bilirubin Total 1.3 0.2 - 1.3 mg/dL    Albumin 3.2 (L) 3.4 - 5.0 g/dL    Protein Total 6.1 (L) 6.8 - 8.8 g/dL    Alkaline Phosphatase 94 40 - 150 U/L    ALT 58 (H) 0 - 50 U/L    AST 20 0 - 45 U/L   CBC with platelets differential    Collection Time: 10/18/19  7:18 AM   Result Value Ref Range    WBC 3.7 (L) 4.0 - 11.0 10e9/L    RBC Count 4.17 3.8 - 5.2 10e12/L    Hemoglobin 11.5 (L) 11.7 - 15.7 g/dL    Hematocrit 36.1 35.0 - 47.0 %    MCV 87 78 - 100 fl    MCH 27.6 26.5 - 33.0 pg    MCHC 31.9 31.5 - 36.5 g/dL    RDW 13.5 10.0 - 15.0 %    Platelet Count 142 (L) 150 - 450 10e9/L    Diff Method Automated Method     % Neutrophils 72.7 %    % Lymphocytes 16.7 %    % Monocytes 7.8 %    % Eosinophils 2.2 %    % Basophils 0.3 %    % Immature Granulocytes 0.3 %    Nucleated RBCs 0 0 /100    Absolute Neutrophil 2.7 1.6 - 8.3 10e9/L    Absolute Lymphocytes 0.6 (L) 0.8 - 5.3 10e9/L    Absolute Monocytes 0.3 0.0 - 1.3 10e9/L    Absolute Eosinophils 0.1 0.0 - 0.7 10e9/L    Absolute Basophils 0.0 0.0 - 0.2 10e9/L    Abs Immature Granulocytes 0.0 0 - 0.4 10e9/L    Absolute Nucleated RBC 0.0    Routine UA with micro reflex to culture    Collection Time: 10/18/19  7:57 AM   Result Value Ref Range    Color Urine Yellow     Appearance Urine Cloudy     Glucose Urine Negative NEG^Negative mg/dL    Bilirubin Urine Negative NEG^Negative    Ketones Urine Negative NEG^Negative mg/dL    Specific Gravity Urine 1.012 1.003 - 1.035    Blood Urine Negative  NEG^Negative    pH Urine 8.0 (H) 5.0 - 7.0 pH    Protein Albumin Urine Negative NEG^Negative mg/dL    Urobilinogen mg/dL 0.0 0.0 - 2.0 mg/dL    Nitrite Urine Positive (A) NEG^Negative    Leukocyte Esterase Urine Large (A) NEG^Negative    Source Midstream Urine     WBC Urine >182 (H) 0 - 5 /HPF    RBC Urine 1 0 - 2 /HPF    Bacteria Urine Few (A) NEG^Negative /HPF   Urine Culture Aerobic Bacterial    Collection Time: 10/18/19  7:57 AM   Result Value Ref Range    Specimen Description Midstream Urine     Special Requests Specimen received in preservative     Culture Micro PENDING

## 2019-10-21 LAB
FUNGUS SPEC CULT: NORMAL
Lab: NORMAL
SPECIMEN SOURCE: NORMAL

## 2019-10-22 LAB
BACTERIA SPEC CULT: ABNORMAL
Lab: ABNORMAL
SPECIMEN SOURCE: ABNORMAL

## 2019-10-29 ENCOUNTER — CARE COORDINATION (OUTPATIENT)
Dept: PALLIATIVE CARE | Facility: CLINIC | Age: 69
End: 2019-10-29

## 2019-10-29 NOTE — PROGRESS NOTES
"10/29/2019   3:47 PM  Unable to reach son today to coordinate moms appointments. Francie has missed several appointments here and Francie is not able to coordinate them herself. Her adult day care person is calling and writer is not sure why. Francie is not there still and she does not have authority to talk with us.     Francie needs one more Rituxan if able, provider visit with us or someone as she last had a UTI. She is also missing Radiation treatments this week.     Her  person said she has \"no rides\". asking who do I call?.     Will involve  once I have talked with family.   "

## 2019-11-04 ENCOUNTER — APPOINTMENT (OUTPATIENT)
Dept: RADIATION ONCOLOGY | Facility: CLINIC | Age: 69
End: 2019-11-04
Attending: RADIOLOGY
Payer: COMMERCIAL

## 2019-11-04 VITALS
HEART RATE: 69 BPM | SYSTOLIC BLOOD PRESSURE: 113 MMHG | WEIGHT: 121.4 LBS | DIASTOLIC BLOOD PRESSURE: 75 MMHG | BODY MASS INDEX: 24.52 KG/M2

## 2019-11-04 DIAGNOSIS — C83.390 CNS LYMPHOMA: Primary | ICD-10-CM

## 2019-11-04 PROCEDURE — 77386 ZZH IMRT TREATMENT DELIVERY, COMPLEX: CPT | Performed by: RADIOLOGY

## 2019-11-04 NOTE — LETTER
Date:November 5, 2019      Provider requested that no letter be sent. Do not send.       [facility] Health Information

## 2019-11-04 NOTE — LETTER
"2019       RE: Simran Negro  [address]   [place] [facility] [place] [address]     Dear Colleague,    Thank you for referring your patient, Simran Negro, to the RADIATION ONCOLOGY CLINIC. Please see a copy of my visit note below.    [facility] PHYSICIANS  SPECIALIZING IN BREAKTHROUGHS  Radiation Oncology    On Treatment Visit Note      Simran Negro      Date: 2019   MRN: [id number]   : 1950  Diagnosis: Brain mets      Reason for Visit:  On Radiation Treatment Visit     Treatment Summary to Date  Treatment Site: Whole brain, with boost Current Dose: 3680/4400 cGy Fractions: 15/19(Boost added)      Chemotherapy  Chemo concurrent with radx?: No(last chemo was 10/4/19)    Subjective:  Pt returns to clinic for boost portion of treatment after finishing WBRT on 10/18/19. Lost hair with WBRT which is growing back. Reports poor appetite. Also endorses a persistent headache which she says is \"pretty severe\". Taking tylenol every 4 hours and tylenol #3 PRN for more severe pain. Needs appointments after 2:30 since she is unable to get a ride to treatment otherwise.    Nursing ROS:   Nutrition Alteration  Diet Type: Patient's Preference  Nutrition Note: appetite low  Skin  Skin Reaction: 0 - No changes           Gastrointestinal  Nausea: 0 - None  Diarrhea: 0 - None  Constipation NCI: 1 - Occasional or intermittent s/sx  Genitourinary   Note: WNL  Psychosocial  Pyschosocial Note: Feeling very tired  Pain Assessment  0-10 Pain Scale: 0  Pain Note: see above      Objective:   /75   Pulse 69   Wt 55.1 kg (121 lb 6.4 oz)   BMI 24.52 kg/m     Gen: Appears well, in no acute distress  Skin: No erythema    Labs:  CBC RESULTS:   Recent Labs   Lab Test 10/18/19  0718   WBC 3.7*   RBC 4.17   HGB 11.5*   HCT 36.1   MCV 87   MCH 27.6   MCHC 31.9   RDW 13.5   *     ELECTROLYTES:  Recent Labs   Lab Test 10/18/19  0718      POTASSIUM 3.8   CHLORIDE 109   EMMA 8.7   CO2 28   BUN 9   CR 0.80   * "       Assessment:    Tolerating radiation therapy well.  All questions and concerns addressed.    Toxicities:  Alopecia: Grade 2: Hair loss >=50% of normal; readily apparent to others; wig or hair piece necessary to camoflage  Fatigue: Grade 0: No toxicity  Headache: Grade 1: Mild pain  Dermatitis: Grade 0: No toxicity    Plan:   1. Continue current therapy.    2. Treatments for the rest of the week rearranged to be at the end of the day to accommodate rides.        Mosaiq chart and setup information reviewed  MVCT/IGRT images checked         Educational Topic Discussed  Additional Instructions: Treatment times changes,  in the room the entire visit  Education Instructions: reviewed    The patient was seen and discussed with staff, Dr. Simmons.    Maicol Cabrera MD  Resident, PGY-5  Department of Radiation Oncology  [facility]  P: [phone]    I saw and examined the patient with the resident.  I have reviewed and edited the resident's note and agree with the plan of care.      Chin Simmons MD      Please do not send letter to referring physician.          Again, thank you for allowing me to participate in the care of your patient.      Sincerely,    Chin Simmons MD

## 2019-11-04 NOTE — PROGRESS NOTES
"[facility] PHYSICIANS  SPECIALIZING IN BREAKTHROUGHS  Radiation Oncology    On Treatment Visit Note      Simran Negro      Date: 2019   MRN: [id number]   : 1950  Diagnosis: Brain mets      Reason for Visit:  On Radiation Treatment Visit     Treatment Summary to Date  Treatment Site: Whole brain, with boost Current Dose: 3680/4400 cGy Fractions: 15/19(Boost added)      Chemotherapy  Chemo concurrent with radx?: No(last chemo was 10/4/19)    Subjective:  Pt returns to clinic for boost portion of treatment after finishing WBRT on 10/18/19. Lost hair with WBRT which is growing back. Reports poor appetite. Also endorses a persistent headache which she says is \"pretty severe\". Taking tylenol every 4 hours and tylenol #3 PRN for more severe pain. Needs appointments after 2:30 since she is unable to get a ride to treatment otherwise.    Nursing ROS:   Nutrition Alteration  Diet Type: Patient's Preference  Nutrition Note: appetite low  Skin  Skin Reaction: 0 - No changes           Gastrointestinal  Nausea: 0 - None  Diarrhea: 0 - None  Constipation NCI: 1 - Occasional or intermittent s/sx  Genitourinary   Note: WNL  Psychosocial  Pyschosocial Note: Feeling very tired  Pain Assessment  0-10 Pain Scale: 0  Pain Note: see above      Objective:   /75   Pulse 69   Wt 55.1 kg (121 lb 6.4 oz)   BMI 24.52 kg/m    Gen: Appears well, in no acute distress  Skin: No erythema    Labs:  CBC RESULTS:   Recent Labs   Lab Test 10/18/19  0718   WBC 3.7*   RBC 4.17   HGB 11.5*   HCT 36.1   MCV 87   MCH 27.6   MCHC 31.9   RDW 13.5   *     ELECTROLYTES:  Recent Labs   Lab Test 10/18/19  0718      POTASSIUM 3.8   CHLORIDE 109   EMMA 8.7   CO2 28   BUN 9   CR 0.80   *       Assessment:    Tolerating radiation therapy well.  All questions and concerns addressed.    Toxicities:  Alopecia: Grade 2: Hair loss >=50% of normal; readily apparent to others; wig or hair piece necessary to " camoflage  Fatigue: Grade 0: No toxicity  Headache: Grade 1: Mild pain  Dermatitis: Grade 0: No toxicity    Plan:   1. Continue current therapy.    2. Treatments for the rest of the week rearranged to be at the end of the day to accommodate rides.        Mosaiq chart and setup information reviewed  MVCT/IGRT images checked         Educational Topic Discussed  Additional Instructions: Treatment times changes,  in the room the entire visit  Education Instructions: reviewed    The patient was seen and discussed with staff, Dr. Simmons.    Maicol Cabrera MD  Resident, PGY-5  Department of Radiation Oncology  [facility]  P: [phone]    I saw and examined the patient with the resident.  I have reviewed and edited the resident's note and agree with the plan of care.      Chin Simmons MD      Please do not send letter to referring physician.

## 2019-11-05 ENCOUNTER — APPOINTMENT (OUTPATIENT)
Dept: RADIATION ONCOLOGY | Facility: CLINIC | Age: 69
End: 2019-11-05
Attending: RADIOLOGY
Payer: COMMERCIAL

## 2019-11-05 PROCEDURE — 77386 ZZH IMRT TREATMENT DELIVERY, COMPLEX: CPT | Performed by: RADIOLOGY

## 2019-11-06 ENCOUNTER — APPOINTMENT (OUTPATIENT)
Dept: RADIATION ONCOLOGY | Facility: CLINIC | Age: 69
End: 2019-11-06
Attending: RADIOLOGY
Payer: COMMERCIAL

## 2019-11-06 PROCEDURE — 77386 ZZH IMRT TREATMENT DELIVERY, COMPLEX: CPT | Performed by: RADIOLOGY

## 2019-11-07 ENCOUNTER — APPOINTMENT (OUTPATIENT)
Dept: RADIATION ONCOLOGY | Facility: CLINIC | Age: 69
End: 2019-11-07
Attending: RADIOLOGY
Payer: COMMERCIAL

## 2019-11-07 ENCOUNTER — OFFICE VISIT (OUTPATIENT)
Dept: INTERPRETER SERVICES | Facility: CLINIC | Age: 69
End: 2019-11-07

## 2019-11-07 PROCEDURE — 77386 ZZH IMRT TREATMENT DELIVERY, COMPLEX: CPT | Performed by: RADIOLOGY

## 2019-11-07 PROCEDURE — 77336 RADIATION PHYSICS CONSULT: CPT | Performed by: RADIOLOGY

## 2019-11-08 ENCOUNTER — OFFICE VISIT (OUTPATIENT)
Dept: RADIATION ONCOLOGY | Facility: CLINIC | Age: 69
End: 2019-11-08
Attending: RADIOLOGY
Payer: COMMERCIAL

## 2019-11-08 VITALS — DIASTOLIC BLOOD PRESSURE: 74 MMHG | SYSTOLIC BLOOD PRESSURE: 110 MMHG | HEART RATE: 70 BPM

## 2019-11-08 DIAGNOSIS — C83.390 CNS LYMPHOMA: Primary | ICD-10-CM

## 2019-11-08 PROCEDURE — 77386 ZZH IMRT TREATMENT DELIVERY, COMPLEX: CPT | Performed by: RADIOLOGY

## 2019-11-08 NOTE — LETTER
Date:November 11, 2019      Provider requested that no letter be sent. Do not send.       [facility] Health Information

## 2019-11-08 NOTE — LETTER
"2019       RE: Simran Negro  [address]   [place] [address]     Dear Colleague,    Thank you for referring your patient, Simran Negro, to the RADIATION ONCOLOGY CLINIC. Please see a copy of my visit note below.    [facility] PHYSICIANS  SPECIALIZING IN BREAKTHROUGHS  Radiation Oncology    On Treatment Visit Note      Simran Negro      Date: 2019   MRN: [id number]   : 1950  Diagnosis: Brain mets      Reason for Visit:  On Radiation Treatment Visit     Treatment Summary to Date  Treatment Site: Whole brain, with boost Current Dose: 4400/4400 cGy Fractions: (Boost added)      Chemotherapy  Chemo concurrent with radx?: No(last chemo was 10/4/19)    ED Visit/Hosiptal Admission: None     Treatment Breaks: 2 week break b/w initial phase of 35 Gy and boost of 9 Gy.  The first week was due to treatment planning; the second week was due to patient being \"too busy\".        Subjective:   Simran completed brain radiation therapy today.  She reports unchanged headaches for which she is taking Tylenol #3.  She otherwise has no new symptoms.      Nursing ROS:   Nutrition Alteration  Diet Type: Patient's Preference  Nutrition Note: appetite low  Skin  Skin Reaction: 0 - No changes  CNS Alteration  CNS note: No complaints         Gastrointestinal  Nausea: 0 - None  Diarrhea: 0 - None  Constipation NCI: 1 - Occasional or intermittent s/sx  Genitourinary   Note: WNL  Psychosocial  Pyschosocial Note: Feeling very tired  Pain Assessment  0-10 Pain Scale: 0  Pain Note: see above      Objective:   /74   Pulse 70   Gen: Appears well, in no acute distress  Skin: Dry and slightly erythematous scalp.      Labs:  CBC RESULTS:   Recent Labs   Lab Test 10/18/19  0718   WBC 3.7*   RBC 4.17   HGB 11.5*   HCT 36.1   MCV 87   MCH 27.6   MCHC 31.9   RDW 13.5   *     ELECTROLYTES:  Recent Labs   Lab Test 10/18/19  0718      POTASSIUM 3.8   CHLORIDE 109   EMMA 8.7   CO2 28   BUN 9   CR 0.80   GLC " 130*       Assessment:    Tolerating radiation therapy well.  All questions and concerns addressed.    Toxicities:  Alopecia: Grade 2: Hair loss >=50% of normal; readily apparent to others; wig or hair piece necessary to camoflage  Fatigue: Grade 1: Fatigue relieved by rest  Headache: Grade 1: Mild pain  Dermatitis: Grade 1: Faint erythema or dry desquamation    Plan:   1. She will primarily follow-up with Dr. Dailey.  She can be seen in our clinic on as needed basis.       Mosaiq chart and setup information reviewed  Ports checked         Educational Topic Discussed  Additional Instructions:  in room for visit  Education Instructions: reviewed        Chin Simmons MD/PhD  [phone] clinic  Pager [phone]    Please do not send letter to referring physician.      Again, thank you for allowing me to participate in the care of your patient.      Sincerely,    Chin Simmons MD

## 2019-11-09 NOTE — PROGRESS NOTES
"[facility] PHYSICIANS  SPECIALIZING IN BREAKTHROUGHS  Radiation Oncology    On Treatment Visit Note      Simran Negro      Date: 2019   MRN: [id number]   : 1950  Diagnosis: Brain mets      Reason for Visit:  On Radiation Treatment Visit     Treatment Summary to Date  Treatment Site: Whole brain, with boost Current Dose: 4400/4400 cGy Fractions: (Boost added)      Chemotherapy  Chemo concurrent with radx?: No(last chemo was 10/4/19)    ED Visit/Hosiptal Admission: None     Treatment Breaks: 2 week break b/w initial phase of 35 Gy and boost of 9 Gy.  The first week was due to treatment planning; the second week was due to patient being \"too busy\".        Subjective:   Simran completed brain radiation therapy today.  She reports unchanged headaches for which she is taking Tylenol #3.  She otherwise has no new symptoms.      Nursing ROS:   Nutrition Alteration  Diet Type: Patient's Preference  Nutrition Note: appetite low  Skin  Skin Reaction: 0 - No changes  CNS Alteration  CNS note: No complaints         Gastrointestinal  Nausea: 0 - None  Diarrhea: 0 - None  Constipation NCI: 1 - Occasional or intermittent s/sx  Genitourinary   Note: WNL  Psychosocial  Pyschosocial Note: Feeling very tired  Pain Assessment  0-10 Pain Scale: 0  Pain Note: see above      Objective:   /74   Pulse 70   Gen: Appears well, in no acute distress  Skin: Dry and slightly erythematous scalp.      Labs:  CBC RESULTS:   Recent Labs   Lab Test 10/18/19  0718   WBC 3.7*   RBC 4.17   HGB 11.5*   HCT 36.1   MCV 87   MCH 27.6   MCHC 31.9   RDW 13.5   *     ELECTROLYTES:  Recent Labs   Lab Test 10/18/19  0718      POTASSIUM 3.8   CHLORIDE 109   EMMA 8.7   CO2 28   BUN 9   CR 0.80   *       Assessment:    Tolerating radiation therapy well.  All questions and concerns addressed.    Toxicities:  Alopecia: Grade 2: Hair loss >=50% of normal; readily apparent to others; wig or hair piece necessary to " camoflage  Fatigue: Grade 1: Fatigue relieved by rest  Headache: Grade 1: Mild pain  Dermatitis: Grade 1: Faint erythema or dry desquamation    Plan:   1. She will primarily follow-up with Dr. Dailey.  She can be seen in our clinic on as needed basis.       Mosaiq chart and setup information reviewed  Ports checked         Educational Topic Discussed  Additional Instructions:  in room for visit  Education Instructions: reviewed        Chin Simmons MD/PhD  [phone] clinic  Pager [phone]    Please do not send letter to referring physician.

## 2019-11-22 ENCOUNTER — ONCOLOGY VISIT (OUTPATIENT)
Dept: ONCOLOGY | Facility: CLINIC | Age: 69
End: 2019-11-22
Attending: PHYSICIAN ASSISTANT
Payer: COMMERCIAL

## 2019-11-22 ENCOUNTER — APPOINTMENT (OUTPATIENT)
Dept: LAB | Facility: CLINIC | Age: 69
End: 2019-11-22
Attending: INTERNAL MEDICINE
Payer: COMMERCIAL

## 2019-11-22 ENCOUNTER — INFUSION THERAPY VISIT (OUTPATIENT)
Dept: ONCOLOGY | Facility: CLINIC | Age: 69
End: 2019-11-22
Attending: INTERNAL MEDICINE
Payer: COMMERCIAL

## 2019-11-22 VITALS
SYSTOLIC BLOOD PRESSURE: 107 MMHG | TEMPERATURE: 98.7 F | RESPIRATION RATE: 16 BRPM | WEIGHT: 121.6 LBS | DIASTOLIC BLOOD PRESSURE: 57 MMHG | HEART RATE: 56 BPM | OXYGEN SATURATION: 97 % | BODY MASS INDEX: 24.56 KG/M2

## 2019-11-22 DIAGNOSIS — N30.00 ACUTE CYSTITIS WITHOUT HEMATURIA: Primary | ICD-10-CM

## 2019-11-22 DIAGNOSIS — N30.00 ACUTE CYSTITIS WITHOUT HEMATURIA: ICD-10-CM

## 2019-11-22 DIAGNOSIS — C83.390 CNS LYMPHOMA: ICD-10-CM

## 2019-11-22 DIAGNOSIS — C83.390 CNS LYMPHOMA: Primary | ICD-10-CM

## 2019-11-22 LAB
ALBUMIN SERPL-MCNC: 3.7 G/DL (ref 3.4–5)
ALBUMIN UR-MCNC: NEGATIVE MG/DL
ALP SERPL-CCNC: 67 U/L (ref 40–150)
ALT SERPL W P-5'-P-CCNC: 20 U/L (ref 0–50)
ANION GAP SERPL CALCULATED.3IONS-SCNC: 6 MMOL/L (ref 3–14)
APPEARANCE UR: ABNORMAL
AST SERPL W P-5'-P-CCNC: 20 U/L (ref 0–45)
BACTERIA #/AREA URNS HPF: ABNORMAL /HPF
BASOPHILS # BLD AUTO: 0 10E9/L (ref 0–0.2)
BASOPHILS NFR BLD AUTO: 0.4 %
BILIRUB SERPL-MCNC: 1.4 MG/DL (ref 0.2–1.3)
BILIRUB UR QL STRIP: NEGATIVE
BUN SERPL-MCNC: 6 MG/DL (ref 7–30)
CALCIUM SERPL-MCNC: 8.7 MG/DL (ref 8.5–10.1)
CHLORIDE SERPL-SCNC: 108 MMOL/L (ref 94–109)
CO2 SERPL-SCNC: 27 MMOL/L (ref 20–32)
COLOR UR AUTO: YELLOW
CREAT SERPL-MCNC: 0.75 MG/DL (ref 0.52–1.04)
DIFFERENTIAL METHOD BLD: NORMAL
EOSINOPHIL # BLD AUTO: 0.1 10E9/L (ref 0–0.7)
EOSINOPHIL NFR BLD AUTO: 2.8 %
ERYTHROCYTE [DISTWIDTH] IN BLOOD BY AUTOMATED COUNT: 12.8 % (ref 10–15)
GFR SERPL CREATININE-BSD FRML MDRD: 82 ML/MIN/{1.73_M2}
GLUCOSE SERPL-MCNC: 122 MG/DL (ref 70–99)
GLUCOSE UR STRIP-MCNC: NEGATIVE MG/DL
HCT VFR BLD AUTO: 37.1 % (ref 35–47)
HGB BLD-MCNC: 12.2 G/DL (ref 11.7–15.7)
HGB UR QL STRIP: NEGATIVE
IMM GRANULOCYTES # BLD: 0 10E9/L (ref 0–0.4)
IMM GRANULOCYTES NFR BLD: 0.2 %
KETONES UR STRIP-MCNC: NEGATIVE MG/DL
LEUKOCYTE ESTERASE UR QL STRIP: ABNORMAL
LYMPHOCYTES # BLD AUTO: 1.6 10E9/L (ref 0.8–5.3)
LYMPHOCYTES NFR BLD AUTO: 34.3 %
MCH RBC QN AUTO: 28.2 PG (ref 26.5–33)
MCHC RBC AUTO-ENTMCNC: 32.9 G/DL (ref 31.5–36.5)
MCV RBC AUTO: 86 FL (ref 78–100)
MONOCYTES # BLD AUTO: 0.4 10E9/L (ref 0–1.3)
MONOCYTES NFR BLD AUTO: 7.9 %
MUCOUS THREADS #/AREA URNS LPF: PRESENT /LPF
NEUTROPHILS # BLD AUTO: 2.6 10E9/L (ref 1.6–8.3)
NEUTROPHILS NFR BLD AUTO: 54.4 %
NITRATE UR QL: NEGATIVE
NRBC # BLD AUTO: 0 10*3/UL
NRBC BLD AUTO-RTO: 0 /100
PH UR STRIP: 7 PH (ref 5–7)
PLATELET # BLD AUTO: 228 10E9/L (ref 150–450)
POTASSIUM SERPL-SCNC: 3.5 MMOL/L (ref 3.4–5.3)
PROT SERPL-MCNC: 6.4 G/DL (ref 6.8–8.8)
RBC # BLD AUTO: 4.33 10E12/L (ref 3.8–5.2)
RBC #/AREA URNS AUTO: 1 /HPF (ref 0–2)
SODIUM SERPL-SCNC: 141 MMOL/L (ref 133–144)
SOURCE: ABNORMAL
SP GR UR STRIP: 1.01 (ref 1–1.03)
SQUAMOUS #/AREA URNS AUTO: 3 /HPF (ref 0–1)
TRANS CELLS #/AREA URNS HPF: 2 /HPF
UROBILINOGEN UR STRIP-MCNC: 0 MG/DL (ref 0–2)
WBC # BLD AUTO: 4.7 10E9/L (ref 4–11)
WBC #/AREA URNS AUTO: 13 /HPF (ref 0–5)

## 2019-11-22 PROCEDURE — 25800030 ZZH RX IP 258 OP 636: Mod: ZF | Performed by: PHYSICIAN ASSISTANT

## 2019-11-22 PROCEDURE — 81001 URINALYSIS AUTO W/SCOPE: CPT | Performed by: PHYSICIAN ASSISTANT

## 2019-11-22 PROCEDURE — G0463 HOSPITAL OUTPT CLINIC VISIT: HCPCS | Mod: ZF

## 2019-11-22 PROCEDURE — 87086 URINE CULTURE/COLONY COUNT: CPT | Performed by: PHYSICIAN ASSISTANT

## 2019-11-22 PROCEDURE — 96413 CHEMO IV INFUSION 1 HR: CPT

## 2019-11-22 PROCEDURE — 99214 OFFICE O/P EST MOD 30 MIN: CPT | Mod: ZP | Performed by: PHYSICIAN ASSISTANT

## 2019-11-22 PROCEDURE — 96415 CHEMO IV INFUSION ADDL HR: CPT

## 2019-11-22 PROCEDURE — 80053 COMPREHEN METABOLIC PANEL: CPT | Performed by: PHYSICIAN ASSISTANT

## 2019-11-22 PROCEDURE — 85025 COMPLETE CBC W/AUTO DIFF WBC: CPT | Performed by: PHYSICIAN ASSISTANT

## 2019-11-22 PROCEDURE — 25000128 H RX IP 250 OP 636: Mod: ZF | Performed by: PHYSICIAN ASSISTANT

## 2019-11-22 PROCEDURE — 25000132 ZZH RX MED GY IP 250 OP 250 PS 637: Mod: ZF | Performed by: PHYSICIAN ASSISTANT

## 2019-11-22 RX ORDER — DIPHENHYDRAMINE HCL 25 MG
50 CAPSULE ORAL ONCE
Status: COMPLETED | OUTPATIENT
Start: 2019-11-22 | End: 2019-11-22

## 2019-11-22 RX ORDER — ACETAMINOPHEN 325 MG/1
650 TABLET ORAL ONCE
Status: CANCELLED
Start: 2019-11-22

## 2019-11-22 RX ORDER — ALBUTEROL SULFATE 90 UG/1
1-2 AEROSOL, METERED RESPIRATORY (INHALATION)
Status: CANCELLED
Start: 2019-11-22

## 2019-11-22 RX ORDER — ACETAMINOPHEN 325 MG/1
650 TABLET ORAL ONCE
Status: COMPLETED | OUTPATIENT
Start: 2019-11-22 | End: 2019-11-22

## 2019-11-22 RX ORDER — ALBUTEROL SULFATE 0.83 MG/ML
2.5 SOLUTION RESPIRATORY (INHALATION)
Status: CANCELLED | OUTPATIENT
Start: 2019-11-22

## 2019-11-22 RX ORDER — SODIUM CHLORIDE 9 MG/ML
1000 INJECTION, SOLUTION INTRAVENOUS CONTINUOUS PRN
Status: CANCELLED
Start: 2019-11-22

## 2019-11-22 RX ORDER — EPINEPHRINE 1 MG/ML
0.3 INJECTION, SOLUTION INTRAMUSCULAR; SUBCUTANEOUS EVERY 5 MIN PRN
Status: CANCELLED | OUTPATIENT
Start: 2019-11-22

## 2019-11-22 RX ORDER — DIPHENHYDRAMINE HYDROCHLORIDE 50 MG/ML
50 INJECTION INTRAMUSCULAR; INTRAVENOUS
Status: CANCELLED
Start: 2019-11-22

## 2019-11-22 RX ORDER — DIPHENHYDRAMINE HCL 25 MG
50 CAPSULE ORAL ONCE
Status: CANCELLED
Start: 2019-11-22

## 2019-11-22 RX ORDER — METHYLPREDNISOLONE SODIUM SUCCINATE 125 MG/2ML
125 INJECTION, POWDER, LYOPHILIZED, FOR SOLUTION INTRAMUSCULAR; INTRAVENOUS
Status: CANCELLED
Start: 2019-11-22

## 2019-11-22 RX ORDER — MEPERIDINE HYDROCHLORIDE 25 MG/ML
25 INJECTION INTRAMUSCULAR; INTRAVENOUS; SUBCUTANEOUS EVERY 30 MIN PRN
Status: CANCELLED | OUTPATIENT
Start: 2019-11-22

## 2019-11-22 RX ORDER — NALOXONE HYDROCHLORIDE 0.4 MG/ML
.1-.4 INJECTION, SOLUTION INTRAMUSCULAR; INTRAVENOUS; SUBCUTANEOUS
Status: CANCELLED | OUTPATIENT
Start: 2019-11-22

## 2019-11-22 RX ADMIN — ACETAMINOPHEN 650 MG: 325 TABLET ORAL at 09:04

## 2019-11-22 RX ADMIN — DIPHENHYDRAMINE HYDROCHLORIDE 50 MG: 25 CAPSULE ORAL at 09:04

## 2019-11-22 RX ADMIN — RITUXIMAB 600 MG: 10 INJECTION, SOLUTION INTRAVENOUS at 09:46

## 2019-11-22 ASSESSMENT — PAIN SCALES - GENERAL: PAINLEVEL: NO PAIN (0)

## 2019-11-22 NOTE — NURSING NOTE
Chief Complaint   Patient presents with     Blood Draw     Labs drawn via PIV by RN in lab. VS taken.      Labs drawn via peripheral IV. Vital signs taken. Checked into next appointment.   Migdalia Renteria RN

## 2019-11-22 NOTE — PATIENT INSTRUCTIONS
For Constipation: Can take up to 8 pills of Senna-S a day (4 pills BID). There is no maximum amount of Miralax you can take a day; side effect of too much would be diarrhea, bloating and increased gas. Increase amounts until having 1-2 stools per day

## 2019-11-22 NOTE — PROGRESS NOTES
Oncology/Hematology Visit Note  Nov 22, 2019    Reason for Visit: follow up of relapsed CNS lymphoma    History of Present Illness: Simran Negro is a 69 year old female with relapsed CNS lymphoma. She was diagnosed with DLBCL of the cervix in 06/2007 and was treated with 6 cycles of R-CHOP.  She attained a CR, but in early 2014 developed difficulty concentrating, gait instability, and right-sided headaches with some blurry vision.  Workup revealed an intra-axial right parietal mass.  Stereotactic biopsy on 03/19/2014 which was diagnostic for diffuse large B-cell lymphoma, non-germinal center type.  Immunophenotypically this was similar to her previous diffuse large B-cell lymphoma, and this was presumed CNS relapse.  She was started on MT-R (methotrexate, temozolomide and rituximab) therapy in 03/2014.  Her course was complicated by line-related DVT, and she completed a course of enoxaparin for this.  She attained a complete clinical and radiographic response after completing the MT-R treatment regimen and EA consolidation in 8/2014.    A routine brain MRI on 9/19/19 revealed two new b/l frontal lobe lesions concerning for recurrent lymphoma. She was admitted for further workup and management. CT CAP completed, no e/o distant sites of disease. PET scan confirms brain lesions as only sites of disease. She underwent LP in attempt to diagnose the 2 brain lesions, unfortunately flow was negative. NSG was consulted to consideration of a brain biopsy, however, after extensive discussion the patient declined the procedure. Preferred to pursue modified treatment plan and whole brain radiation. She was started on C1D1 Rituxan with 5 days of dexamethasone 40mg daily on 9/26. Radiation Oncology was also consulted during admission. They are willing to offer whole brain radiation without a tissue diagnosis. Simulation was completed prior to discharge on 9/27.    She is s/p C2 Rituxan on 10/4 and was scheduled 10/11 for C3, but  "missed her appointment. She then was schduled to return for week 4 the next week but again missed her appt and did not reschedule. She presents today (11/22) for \"week 4\" of Rituxan.     Interval History:  [place] [facility] [place] She [place] a poor historian.     HA are now improved, now getting them about once a week. She takes medication (APAP) every night to prevent HA still. Still having some floaters. No neuropathy in her hand or legs anymore. She does feel a little weakness in her legs, attributes this to not eating.     Her biggest compliant her the stomach ache she has been having since she was hospitalized in September. She states that she get full fast and that she is not having lots of stools. She keeps repeating she doesn't know what is wrong. She keeps telling me that she is not having any stools though then when ask specifically she says about every couple days. She taking a powder (likely Miralax) occasionally. She does not think she takes pills for this. She also is having bloating and gas. She takes GasX which helps. She still complaints of painful urination. She did take the antibiotic and doesn't know if it helped.     She doesn't think she should be here because she doesn't have any cancer.      ROS: 10 point ROS neg other than the symptoms noted above in the HPI.    Current Outpatient Medications   Medication Sig Dispense Refill     Acetaminophen (TYLENOL PO) Take 325 mg by mouth       acetaminophen-codeine (TYLENOL #3) 300-30 MG tablet Take 1-2 tablets by mouth every 4 hours as needed for severe pain       allopurinol (ZYLOPRIM) 300 MG tablet Take 1 tablet (300 mg) by mouth daily 30 tablet 0     ammonium lactate (AMLACTIN) 12 % cream Apply two to three times a day as needed.       aspirin 81 MG EC tablet Take 1 tablet (81 mg) by mouth daily       calcium carbonate-vitamin D (OYSTER SHELL CALCIUM/D) 500-200 MG-UNIT tablet Take 1 tablet by mouth 2 times daily       cetirizine (ZYRTEC) 10 " MG tablet Take 1 tablet (10 mg) by mouth daily as needed for allergies       ciprofloxacin (CIPRO) 500 MG tablet Take 1 tablet (500 mg) by mouth daily 3 tablet 0     CYANOCOBALAMIN PO Take 500 mcg by mouth daily       fluticasone (FLONASE) 50 MCG/ACT nasal spray Spray 2 sprays in nostril daily as needed for rhinitis or allergies       magnesium gluconate (MAGONATE) 500 MG tablet Take 500 mg by mouth       Multiple Vitamin (MULTI-VITAMINS) TABS        omeprazole (PRILOSEC) 20 MG DR capsule Take 1 capsule (20 mg) by mouth daily       ORDER FOR DME Please dispense to take 3 Ensure/day. 90 Units 3     polyethylene glycol (MIRALAX/GLYCOLAX) packet Take 17 g by mouth daily Hold for loose stools 30 packet 0     ranitidine (ZANTAC) 150 MG tablet Take 1 tablet (150 mg) by mouth 2 times daily       senna-docusate (SENOKOT-S/PERICOLACE) 8.6-50 MG tablet Take 1 tablet by mouth 2 times daily as needed for constipation 60 tablet 0     simethicone (MYLICON) 80 MG chewable tablet Take 1 tablet (80 mg) by mouth every 6 hours as needed for cramping 30 tablet 0     zolpidem (AMBIEN) 5 MG tablet Take 1 tablet (5 mg) by mouth nightly as needed for sleep         Physical Examination:  General: The patient is a pleasant female in no acute distress.  /57   Pulse 56   Temp 98.7  F (37.1  C) (Oral)   Resp 16   Wt 55.2 kg (121 lb 9.6 oz)   SpO2 97%   BMI 24.56 kg/m    Wt Readings from Last 10 Encounters:   11/22/19 55.2 kg (121 lb 9.6 oz)   11/04/19 55.1 kg (121 lb 6.4 oz)   10/18/19 56.9 kg (125 lb 8 oz)   10/14/19 57.2 kg (126 lb)   10/10/19 56 kg (123 lb 8 oz)   10/07/19 56.5 kg (124 lb 8 oz)   10/04/19 56.7 kg (125 lb)   09/27/19 59.4 kg (130 lb 15.3 oz)   09/09/19 58.7 kg (129 lb 6.6 oz)   03/04/19 61.6 kg (135 lb 12.8 oz)     HEENT: EOMI, PERRL. Sclerae are anicteric. Oral mucosa is pink and moist with no lesions or thrush.   Lymph: Neck is supple with no lymphadenopathy in the cervical or supraclavicular areas.   Heart:  Regular rate and rhythm.   Lungs: Clear to auscultation bilaterally.   Abdomen: Bowel sounds present, soft, mild pain to palpation diffusely but no rigidity, guarding or rebound. Nondistended.  Extremities: No lower extremity edema noted bilaterally.   Neuro: Cranial nerves II through XII are grossly intact. Strength is symmetric in lower extremities today.  Skin: No rashes, petechiae, or bruising noted on exposed skin.    Laboratory Data:   11/22/2019 06:59   Sodium 141   Potassium 3.5   Chloride 108   Carbon Dioxide 27   Urea Nitrogen 6 (L)   Creatinine 0.75   GFR Estimate 82   GFR Estimate If Black >90   Calcium 8.7   Anion Gap 6   Albumin 3.7   Protein Total 6.4 (L)   Bilirubin Total 1.4 (H)   Alkaline Phosphatase 67   ALT 20   AST 20   Glucose 122 (H)   WBC 4.7   Hemoglobin 12.2   Hematocrit 37.1   Platelet Count 228   RBC Count 4.33   MCV 86   MCH 28.2   MCHC 32.9   RDW 12.8   Diff Method Automated Method   % Neutrophils 54.4   % Lymphocytes 34.3   % Monocytes 7.9   % Eosinophils 2.8   % Basophils 0.4   % Immature Granulocytes 0.2   Nucleated RBCs 0   Absolute Neutrophil 2.6   Absolute Lymphocytes 1.6   Absolute Monocytes 0.4   Absolute Eosinophils 0.1   Absolute Basophils 0.0   Abs Immature Granulocytes 0.0   Absolute Nucleated RBC 0.0       Assessment and Plan:    H/o DLBCL with CNS relapse s/p MT-R with CR 2014  New frontal lobe masses  A routine brain MRI on 9/19/19 revealed two new b/l frontal lobe lesions, left sided lesion is 4.2x3.2x3.9cm in size and right sided lesion is 1.9x2.7x3.3cm, concerning for recurrent lymphoma.   - Neurosurgery consulted for biopsy consideration. Preferred to pursue LP as initial step in diagnosis. Given negative CSF flow, NSG recommending brain biopsy, however, patient declined   -LP 9/23. CSF flow negative. Cryptococcus, Toxoplasma negative. Cysticercus antibody IgG negative. EBV PCR positive on 9/26, 10/4   - PET scan completed on 9/24, brain is only site of disease. 9/21  "CT c/a/p showed no evidence of lymphoma    --WBRT started on 10/2 and completed, giving 35 Gy. Brain MRI from 10/18 showed partial response. Was given a boost of WBRT for an additional 9 Gy (done 11/8)  --Started on Rituxan + dex 40 mg x 5 days on 9/26. Missed week 3 on 10/11 due to misunderstanding of schedule. Missed week 4 due to no show and being \"busy\". Will give \"week 4\" today of Rituxan (about a month delayed)  - Continue allopurinol 300 mg daily since there was a delay   --Will repeat MRI on 12/27 and then f/u with Dr. Dailey on 12/30    HA  -Her HA are improved. Still taking APAP every night to prevent HA. Told her to start using only as needed as HA are likely improved and it is not needed     UTI  -UA positive from 10/18.  Given Cipro 500 daily x 3 which she reports she took and was unsure if it was helpful   -still having dysuria. Will repeat UA today to look for persistent infection. Would not treat until we have a positive culture     **Addendum: No bacteria grew on culture. No antibiotics needed    Constipation  -only every couple days. She was given Miralax and she only takes it as needed. Should take every day and can use Senna S prn. She is unsure if she has this or if she is taking it   -using GasX for bloating. Can continue to use prn       GERD  - Continue Omeprazole and Ranitidine     Moderate malnutrition  Continues to reports fatigue and poor appetite. Her weight is stable. Hard to . Even though she feels she is not eating, she is eating enough to maintain her weight.   - Continue to try and eat small, frequent meals  - Supplements between meals with snacks or protein shakes     Kimberly Jin PA-C  [facility]  [address] [place] [address] [phone]    "

## 2019-11-22 NOTE — NURSING NOTE
"Oncology Rooming Note    November 22, 2019 7:44 AM   Simran Negro is a 69 year old female who presents for:    Chief Complaint   Patient presents with     Blood Draw     Labs drawn via PIV by RN in lab. VS taken.      Oncology Clinic Visit     Return - Acute cystitis without hematuria      Initial Vitals: /57   Pulse 56   Temp 98.7  F (37.1  C) (Oral)   Resp 16   Wt 55.2 kg (121 lb 9.6 oz)   SpO2 97%   BMI 24.56 kg/m   Estimated body mass index is 24.56 kg/m  as calculated from the following:    Height as of 10/10/19: 1.499 m (4' 11\").    Weight as of this encounter: 55.2 kg (121 lb 9.6 oz). Body surface area is 1.52 meters squared.  No Pain (0) Comment: Data Unavailable   No LMP recorded. Patient is postmenopausal.  Allergies reviewed: Yes  Medications reviewed: Yes    Medications: MEDICATION REFILLS NEEDED TODAY. Provider was notified.  Pharmacy name entered into [facility]:    [facility] - [place], [place] - [address] [facility] (USE ONLY [facility]) - [place], [place] - [address] [place] [address]    Clinical concerns: Lab Results and refills on Omeperazole and Ranitidine       Andrew Odonnell              "

## 2019-11-22 NOTE — PATIENT INSTRUCTIONS
[facility]ee Triage and after hours / weekends / holidays:  [phone]    Please call the triage or after hours line if you experience a temperature greater than or equal to 100.5, shaking chills, have uncontrolled nausea, vomiting and/or diarrhea, dizziness, shortness of breath, chest pain, bleeding, unexplained bruising, or if you have any other new/concerning symptoms, questions or concerns.      If you are having any concerning symptoms or wish to speak to a provider before your next infusion visit, please call your care coordinator or triage to notify them so we can adequately serve you.     If you need a refill on a narcotic prescription or other medication, please call before your infusion appointment.             November 2019 Sunday Monday Tuesday Wednesday Thursday Friday Saturday                            1     2       3     4    [facility] ON TREATMENT VISIT   9:30 AM   (75 min.)   Chin Simmons MD   Radiation Oncology Clinic    [facility] EXTERNAL RADIATION TREATMT   9:30 AM   (30 min.)   [facility] RAD ONC ELEKTA   Radiation Oncology Clinic 5    [facility] EXTERNAL RADIATION TREATMT   3:45 PM   (75 min.)   [facility] RAD ONC ELEKTA   Radiation Oncology Clinic 6    [facility] OUTPATIENT   3:15 PM   (60 min.)   [place] [facility] [facility]    Services Department    [facility] EXTERNAL RADIATION TREATMT   3:30 PM   (30 min.)   [facility] RAD ONC ELEKTA   Radiation Oncology Clinic 7    [facility] OUTPATIENT   3:45 PM   (45 min.)   Dee Dee Hernandez    Services Department    [facility] EXTERNAL RADIATION TREATMT   4:00 PM   (30 min.)   [facility] RAD ONC ELEKTA   Radiation Oncology Clinic 8    [facility] OUTPATIENT   3:15 PM   (45 min.)   [place] [employer] [facility]    Services Department    [facility] EXTERNAL RADIATION TREATMT   3:30 PM   (30 min.)   [facility] RAD ONC ELEKTA   Radiation Oncology Clinic    P ON TREATMENT VISIT   4:00 PM   (15 min.)   Chin Simmons MD   Radiation Oncology Clinic 9       10     11     12     13      14     15     16       17     18     19     20     21     22    [facility] DRAW   7:15 AM   (15 min.)    [facility] DRAW   [facility] Lab Draw    [facility] RETURN   7:35 AM   (90 min.)   Kimberly Jin PA-C   [facility] ONC INFUSION 360   9:00 AM   (360 min.)   [facility]ION   [facility] 23       24     25     26     27     28     29     30                 December 2019 Sunday Monday Tuesday Wednesday Thursday Friday Saturday   1     2     3     4     5     6     7       8     9     10     11     12     13     14       15     16     17     18     19     20     21       22     23     24     25     26     27    MR BRAIN WWO   2:00 PM   (45 min.)   [address] [facility] MRI 28       29     30    [facility]   6:45 AM   (15 min.)    [facility]RAW   [facility] Lab Draw    [facility] RETURN   7:00 AM   (30 min.)   Karson Dailey MD   [facility] 31                                         Lab Results:  Recent Results (from the past 12 hour(s))   CBC with platelets differential    Collection Time: 11/22/19  6:59 AM   Result Value Ref Range    WBC 4.7 4.0 - 11.0 10e9/L    RBC Count 4.33 3.8 - 5.2 10e12/L    Hemoglobin 12.2 11.7 - 15.7 g/dL    Hematocrit 37.1 35.0 - 47.0 %    MCV 86 78 - 100 fl    MCH 28.2 26.5 - 33.0 pg    MCHC 32.9 31.5 - 36.5 g/dL    RDW 12.8 10.0 - 15.0 %    Platelet Count 228 150 - 450 10e9/L    Diff Method Automated Method     % Neutrophils 54.4 %    % Lymphocytes 34.3 %    % Monocytes 7.9 %    % Eosinophils 2.8 %    % Basophils 0.4 %    % Immature Granulocytes 0.2 %    Nucleated RBCs 0 0 /100    Absolute Neutrophil 2.6 1.6 - 8.3 10e9/L    Absolute Lymphocytes 1.6 0.8 - 5.3 10e9/L    Absolute Monocytes 0.4 0.0 - 1.3 10e9/L    Absolute Eosinophils 0.1 0.0 - 0.7 10e9/L    Absolute Basophils 0.0 0.0 - 0.2 10e9/L    Abs Immature Granulocytes 0.0 0 - 0.4 10e9/L    Absolute Nucleated RBC 0.0     Comprehensive metabolic panel    Collection Time: 11/22/19  6:59 AM   Result Value Ref Range    Sodium 141 133 - 144 mmol/L    Potassium 3.5 3.4 - 5.3 mmol/L    Chloride 108 94 - 109 mmol/L    Carbon Dioxide 27 20 - 32 mmol/L    Anion Gap 6 3 - 14 mmol/L    Glucose 122 (H) 70 - 99 mg/dL    Urea Nitrogen 6 (L) 7 - 30 mg/dL    Creatinine 0.75 0.52 - 1.04 mg/dL    GFR Estimate 82 >60 mL/min/[1.73_m2]    GFR Estimate If Black >90 >60 mL/min/[1.73_m2]    Calcium 8.7 8.5 - 10.1 mg/dL    Bilirubin Total 1.4 (H) 0.2 - 1.3 mg/dL    Albumin 3.7 3.4 - 5.0 g/dL    Protein Total 6.4 (L) 6.8 - 8.8 g/dL    Alkaline Phosphatase 67 40 - 150 U/L    ALT 20 0 - 50 U/L    AST 20 0 - 45 U/L

## 2019-11-22 NOTE — PROGRESS NOTES
Infusion Nursing Note:  Simran Negro presents today for Day 1 Cycle 4 Rituxan  .    Patient seen by provider today: Yes: Kimberly Jin   present during visit today: Yes, Language: Hmong.     Note: Patient presents to infusion clinic feeling well. She denies pain and denies acute complaints or concerns noted at home.     TORB. 11/22/2019. 0838. Kimberly ESPINOZA. Lopez Brennan RN. OK for treatment. Obtain urine for UA reflex to culture. Check with pharmacy to determine if rapid infusion is still appropriate with patient not having infusion since 10/18/2019.    Spoke with pharmacist Delroy about patients history of Rituxan administration and last infusion date. OK to proceed with rapid infusion.     Intravenous Access:  Peripheral IV placed.    Treatment Conditions:  Lab Results   Component Value Date    HGB 12.2 11/22/2019     Lab Results   Component Value Date    WBC 4.7 11/22/2019      Lab Results   Component Value Date    ANEU 2.6 11/22/2019     Lab Results   Component Value Date     11/22/2019      Lab Results   Component Value Date     11/22/2019                   Lab Results   Component Value Date    POTASSIUM 3.5 11/22/2019           Lab Results   Component Value Date    MAG 1.8 09/25/2019            Lab Results   Component Value Date    CR 0.75 11/22/2019                   Lab Results   Component Value Date    EMMA 8.7 11/22/2019                Lab Results   Component Value Date    BILITOTAL 1.4 11/22/2019           Lab Results   Component Value Date    ALBUMIN 3.7 11/22/2019                    Lab Results   Component Value Date    ALT 20 11/22/2019           Lab Results   Component Value Date    AST 20 11/22/2019       Results reviewed, labs MET treatment parameters, ok to proceed with treatment    Post Infusion Assessment:  Patient tolerated infusion without incident.  Blood return noted pre and post infusion.  Site patent and intact, free from redness, edema or discomfort.  No evidence  of extravasations.  Access discontinued per protocol.       Discharge Plan:   Patient declined prescription refills.  Discharge instructions reviewed with: Patient.  Patient and/or family verbalized understanding of discharge instructions and all questions answered.  Copy of AVS reviewed with patient and/or family.  Patient will return 12/30 for next appointment. Patient voiced understanding of appointment schedule  Patient discharged in stable condition accompanied by: .  Departure Mode: Ambulatory.  Face to Face time: 0 minutes.    Lopez Brennan RN

## 2019-11-22 NOTE — LETTER
RE: Simran Negro  [address]   [facility] [address]       Oncology/Hematology Visit Note  Nov 22, 2019    Reason for Visit: follow up of relapsed CNS lymphoma    History of Present Illness: Simran Negro is a 69 year old female with relapsed CNS lymphoma. She was diagnosed with DLBCL of the cervix in 06/2007 and was treated with 6 cycles of R-CHOP.  She attained a CR, but in early 2014 developed difficulty concentrating, gait instability, and right-sided headaches with some blurry vision.  Workup revealed an intra-axial right parietal mass.  Stereotactic biopsy on 03/19/2014 which was diagnostic for diffuse large B-cell lymphoma, non-germinal center type.  Immunophenotypically this was similar to her previous diffuse large B-cell lymphoma, and this was presumed CNS relapse.  She was started on MT-R (methotrexate, temozolomide and rituximab) therapy in 03/2014.  Her course was complicated by line-related DVT, and she completed a course of enoxaparin for this.  She attained a complete clinical and radiographic response after completing the MT-R treatment regimen and EA consolidation in 8/2014.    A routine brain MRI on 9/19/19 revealed two new b/l frontal lobe lesions concerning for recurrent lymphoma. She was admitted for further workup and management. CT CAP completed, no e/o distant sites of disease. PET scan confirms brain lesions as only sites of disease. She underwent LP in attempt to diagnose the 2 brain lesions, unfortunately flow was negative. NSG was consulted to consideration of a brain biopsy, however, after extensive discussion the patient declined the procedure. Preferred to pursue modified treatment plan and whole brain radiation. She was started on C1D1 Rituxan with 5 days of dexamethasone 40mg daily on 9/26. Radiation Oncology was also consulted during admission. They are willing to offer whole brain radiation without a tissue diagnosis. Simulation was completed prior to discharge on 9/27.    She  "is s/p C2 Rituxan on 10/4 and was scheduled 10/11 for C3, but missed her appointment. She then was schduled to return for week 4 the next week but again missed her appt and did not reschedule. She presents today (11/22) for \"week 4\" of Rituxan.     Interval History:  Simran is [place] [facility] [place] She is a poor historian.     HA are now improved, now getting them about once a week. She takes medication (APAP) every night to prevent HA still. Still having some floaters. No neuropathy in her hand or legs anymore. She does feel a little weakness in her legs, attributes this to not eating.     Her biggest compliant her the stomach ache she has been having since she was hospitalized in September. She states that she get full fast and that she is not having lots of stools. She keeps repeating she doesn't know what is wrong. She keeps telling me that she is not having any stools though then when ask specifically she says about every couple days. She taking a powder (likely Miralax) occasionally. She does not think she takes pills for this. She also is having bloating and gas. She takes GasX which helps. She still complaints of painful urination. She did take the antibiotic and doesn't know if it helped.     She doesn't think she should be here because she doesn't have any cancer.      ROS: 10 point ROS neg other than the symptoms noted above in the HPI.    Current Outpatient Medications   Medication Sig Dispense Refill     Acetaminophen (TYLENOL PO) Take 325 mg by mouth       acetaminophen-codeine (TYLENOL #3) 300-30 MG tablet Take 1-2 tablets by mouth every 4 hours as needed for severe pain       allopurinol (ZYLOPRIM) 300 MG tablet Take 1 tablet (300 mg) by mouth daily 30 tablet 0     ammonium lactate (AMLACTIN) 12 % cream Apply two to three times a day as needed.       aspirin 81 MG EC tablet Take 1 tablet (81 mg) by mouth daily       calcium carbonate-vitamin D (OYSTER SHELL CALCIUM/D) 500-200 MG-UNIT tablet Take " 1 tablet by mouth 2 times daily       cetirizine (ZYRTEC) 10 MG tablet Take 1 tablet (10 mg) by mouth daily as needed for allergies       ciprofloxacin (CIPRO) 500 MG tablet Take 1 tablet (500 mg) by mouth daily 3 tablet 0     CYANOCOBALAMIN PO Take 500 mcg by mouth daily       fluticasone (FLONASE) 50 MCG/ACT nasal spray Spray 2 sprays in nostril daily as needed for rhinitis or allergies       magnesium gluconate (MAGONATE) 500 MG tablet Take 500 mg by mouth       Multiple Vitamin (MULTI-VITAMINS) TABS        omeprazole (PRILOSEC) 20 MG DR capsule Take 1 capsule (20 mg) by mouth daily       ORDER FOR DME Please dispense to take 3 Ensure/day. 90 Units 3     polyethylene glycol (MIRALAX/GLYCOLAX) packet Take 17 g by mouth daily Hold for loose stools 30 packet 0     ranitidine (ZANTAC) 150 MG tablet Take 1 tablet (150 mg) by mouth 2 times daily       senna-docusate (SENOKOT-S/PERICOLACE) 8.6-50 MG tablet Take 1 tablet by mouth 2 times daily as needed for constipation 60 tablet 0     simethicone (MYLICON) 80 MG chewable tablet Take 1 tablet (80 mg) by mouth every 6 hours as needed for cramping 30 tablet 0     zolpidem (AMBIEN) 5 MG tablet Take 1 tablet (5 mg) by mouth nightly as needed for sleep         Physical Examination:  General: The patient is a pleasant female in no acute distress.  /57   Pulse 56   Temp 98.7  F (37.1  C) (Oral)   Resp 16   Wt 55.2 kg (121 lb 9.6 oz)   SpO2 97%   BMI 24.56 kg/m     Wt Readings from Last 10 Encounters:   11/22/19 55.2 kg (121 lb 9.6 oz)   11/04/19 55.1 kg (121 lb 6.4 oz)   10/18/19 56.9 kg (125 lb 8 oz)   10/14/19 57.2 kg (126 lb)   10/10/19 56 kg (123 lb 8 oz)   10/07/19 56.5 kg (124 lb 8 oz)   10/04/19 56.7 kg (125 lb)   09/27/19 59.4 kg (130 lb 15.3 oz)   09/09/19 58.7 kg (129 lb 6.6 oz)   03/04/19 61.6 kg (135 lb 12.8 oz)     HEENT: EOMI, PERRL. Sclerae are anicteric. Oral mucosa is pink and moist with no lesions or thrush.   Lymph: Neck is supple with no  lymphadenopathy in the cervical or supraclavicular areas.   Heart: Regular rate and rhythm.   Lungs: Clear to auscultation bilaterally.   Abdomen: Bowel sounds present, soft, mild pain to palpation diffusely but no rigidity, guarding or rebound. Nondistended.  Extremities: No lower extremity edema noted bilaterally.   Neuro: Cranial nerves II through XII are grossly intact. Strength is symmetric in lower extremities today.  Skin: No rashes, petechiae, or bruising noted on exposed skin.    Laboratory Data:   11/22/2019 06:59   Sodium 141   Potassium 3.5   Chloride 108   Carbon Dioxide 27   Urea Nitrogen 6 (L)   Creatinine 0.75   GFR Estimate 82   GFR Estimate If Black >90   Calcium 8.7   Anion Gap 6   Albumin 3.7   Protein Total 6.4 (L)   Bilirubin Total 1.4 (H)   Alkaline Phosphatase 67   ALT 20   AST 20   Glucose 122 (H)   WBC 4.7   Hemoglobin 12.2   Hematocrit 37.1   Platelet Count 228   RBC Count 4.33   MCV 86   MCH 28.2   MCHC 32.9   RDW 12.8   Diff Method Automated Method   % Neutrophils 54.4   % Lymphocytes 34.3   % Monocytes 7.9   % Eosinophils 2.8   % Basophils 0.4   % Immature Granulocytes 0.2   Nucleated RBCs 0   Absolute Neutrophil 2.6   Absolute Lymphocytes 1.6   Absolute Monocytes 0.4   Absolute Eosinophils 0.1   Absolute Basophils 0.0   Abs Immature Granulocytes 0.0   Absolute Nucleated RBC 0.0       Assessment and Plan:    H/o DLBCL with CNS relapse s/p MT-R with CR 2014  New frontal lobe masses  A routine brain MRI on 9/19/19 revealed two new b/l frontal lobe lesions, left sided lesion is 4.2x3.2x3.9cm in size and right sided lesion is 1.9x2.7x3.3cm, concerning for recurrent lymphoma.   - Neurosurgery consulted for biopsy consideration. Preferred to pursue LP as initial step in diagnosis. Given negative CSF flow, NSG recommending brain biopsy, however, patient declined   -LP 9/23. CSF flow negative. Cryptococcus, Toxoplasma negative. Cysticercus antibody IgG negative. EBV PCR positive on 9/26, 10/4  "  - PET scan completed on 9/24, brain is only site of disease. 9/21 CT c/a/p showed no evidence of lymphoma    --WBRT started on 10/2 and completed, giving 35 Gy. Brain MRI from 10/18 showed partial response. Was given a boost of WBRT for an additional 9 Gy (done 11/8)  --Started on Rituxan + dex 40 mg x 5 days on 9/26. Missed week 3 on 10/11 due to misunderstanding of schedule. Missed week 4 due to no show and being \"busy\". Will give \"week 4\" today of Rituxan (about a month delayed)  - Continue allopurinol 300 mg daily since there was a delay   --Will repeat MRI on 12/27 and then f/u with Dr. Dailey on 12/30    HA  -Her HA are improved. Still taking APAP every night to prevent HA. Told her to start using only as needed as HA are likely improved and it is not needed     UTI  -UA positive from 10/18.  Given Cipro 500 daily x 3 which she reports she took and was unsure if it was helpful   -still having dysuria. Will repeat UA today to look for persistent infection. Would not treat until we have a positive culture     **Addendum: No bacteria grew on culture. No antibiotics needed    Constipation  -only every couple days. She was given Miralax and she only takes it as needed. Should take every day and can use Senna S prn. She is unsure if she has this or if she is taking it   -using GasX for bloating. Can continue to use prn       GERD  - Continue Omeprazole and Ranitidine     Moderate malnutrition  Continues to reports fatigue and poor appetite. Her weight is stable. Hard to . Even though she feels she is not eating, she is eating enough to maintain her weight.   - Continue to try and eat small, frequent meals  - Supplements between meals with snacks or protein shakes     Kimberly Jin PA-C  [facility]  [address], [place] [id number] [phone]    "

## 2019-11-23 LAB
BACTERIA SPEC CULT: NORMAL
Lab: NORMAL
SPECIMEN SOURCE: NORMAL

## 2019-12-18 ENCOUNTER — TELEPHONE (OUTPATIENT)
Dept: ONCOLOGY | Facility: CLINIC | Age: 69
End: 2019-12-18

## 2019-12-18 NOTE — TELEPHONE ENCOUNTER
Juancarlos Hernandez [facility], called from [facility] to assist Pt in translating her call.    Pt C/O emesis began this morning, she had 3 episodes the last being 8:30 am and she is feeling quite fatigued. Has been able to keep liquids down but has not eaten due to nausea. Pt Is afebrile with no HA. Pt feels dizzy and sees stars when vomiting (she has a HA then as well). Pt has an appointment with A PCP (but not the Pt's, normal PCP is unavailable). [facility] primary care clinic wanted to be in touch with us to ensure that this is not due to increased ICP.     Spoke with Kimberly Jin, If Pt does not have intractable emesis, vision issues, memory issues, ambulation issues, etc then likely not ICP (but cannot rule out without a visit). OK to see a Dr tomorrow and go from there. If Sx develop overnight Pt should seek out an ED. Pt verbalized agreement and understanding.

## 2019-12-27 ENCOUNTER — ANCILLARY PROCEDURE (OUTPATIENT)
Dept: MRI IMAGING | Facility: CLINIC | Age: 69
End: 2019-12-27
Attending: INTERNAL MEDICINE
Payer: COMMERCIAL

## 2019-12-27 DIAGNOSIS — C83.390 CNS LYMPHOMA: Primary | ICD-10-CM

## 2019-12-27 DIAGNOSIS — C83.390 CNS LYMPHOMA: ICD-10-CM

## 2019-12-27 RX ORDER — GADOBUTROL 604.72 MG/ML
7.5 INJECTION INTRAVENOUS ONCE
Status: COMPLETED | OUTPATIENT
Start: 2019-12-27 | End: 2019-12-27

## 2019-12-27 RX ADMIN — GADOBUTROL 5.5 ML: 604.72 INJECTION INTRAVENOUS at 14:13

## 2019-12-27 NOTE — DISCHARGE INSTRUCTIONS
I discussed the findings, assessment and plan with the resident and agree with the resident's findings and plan as documented in the resident's note. MRI Contrast Discharge Instructions    The IV contrast you received today will pass out of your body in your  urine. This will happen in the next 24 hours. You will not feel this process.  Your urine will not change color.    Drink at least 4 extra glasses of water or juice today (unless your doctor  has restricted your fluids). This reduces the stress on your kidneys.  You may take your regular medicines.    If you are on dialysis: It is best to have dialysis today.    If you have a reaction: Most reactions happen right away. If you have  any new symptoms after leaving the hospital (such as hives or swelling),  call your hospital at the correct number below. Or call your family doctor.  If you have breathing distress or wheezing, call 911.    Special instructions: ***    I have read and understand the above information.    Signature:______________________________________ Date:___________    Staff:__________________________________________ Date:___________     Time:__________    [place] [facility] Departments:    ___[place]: [phone]  ___[facility]: [phone]  ___[place]: [phone] ___[facility]: [phone]  ___[facility]: [phone]  ___[facility]: [phone]  ___[phone]  ___[facility]: [phone]  ___[phone]

## 2020-02-10 ENCOUNTER — HEALTH MAINTENANCE LETTER (OUTPATIENT)
Age: 70
End: 2020-02-10

## 2020-03-03 NOTE — PROGRESS NOTES
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      HISTORY OF PRESENT ILLNESS:  Ms. Negro is a 70 year old [facility] female here for follow up of CNS relapse of DLBCL.  To summarize her course, she was diagnosed with DLBCL of the cervix in 06/2007 and was treated with 6 cycles of R-CHOP.  She attained a CR, but in early 2014 developed difficulty concentrating, gait instability, and right-sided headaches with some blurry vision.  Workup revealed an intra-axial right parietal mass.  Stereotactic biopsy on 03/19/2014 which was diagnostic for diffuse large B-cell lymphoma, non-germinal center type.  Immunophenotypically this was similar to her previous diffuse large B-cell lymphoma, and this was presumed CNS relapse.  She was started on MT-R (methotrexate, temozolomide and rituximab) therapy in 03/2014 with a complete clinical and radiographic response and then completed EA consolidation in 8/2014.  She developed worsening fatigue, some confusion, and a routine brain MRI in 09/2019 revealed two frontal lobe lesions.  Workup was negative for other sites of disease.  She was admitted for workup, CSF was negative, and she and her family ultimately declined diagnostic biopsy.  She was not interested in more intensive chemotherapy which is reasonable.  She was empirically treated with systemic steroids, rituximab, and salvage radiation therapy to try for maximal benefit with minimal risk of toxicity.  It is difficult to determine how much insight she has into her situation.  Repeat MRI 12/27/2019 showed a partial response.  She is here for ongoing management.     She was seen with a [facility]  today.  She has been dealing with a cold with a cough and sore throat that has been slow to resolve.  No fevers, chills, or night sweats.  No headache, vision changes, focal weakness or sensory changes.  No dyspnea or chest pain.  Normal bowel function.  No urinary complaints.  No  bleeding, bruising, or skin rashes.  No pain.  No lumps or bumps.  Overall, managing well.     REVIEW OF SYSTEMS:  A complete review of systems was negative other than noted.    MEDICATIONS:  Current Outpatient Medications   Medication     Acetaminophen (TYLENOL PO)     acetaminophen-codeine (TYLENOL #3) 300-30 MG tablet     allopurinol (ZYLOPRIM) 300 MG tablet     ammonium lactate (AMLACTIN) 12 % cream     aspirin 81 MG EC tablet     calcium carbonate-vitamin D (OYSTER SHELL CALCIUM/D) 500-200 MG-UNIT tablet     cetirizine (ZYRTEC) 10 MG tablet     ciprofloxacin (CIPRO) 500 MG tablet     CYANOCOBALAMIN PO     fluticasone (FLONASE) 50 MCG/ACT nasal spray     magnesium gluconate (MAGONATE) 500 MG tablet     Multiple Vitamin (MULTI-VITAMINS) TABS     omeprazole (PRILOSEC) 20 MG DR capsule     ORDER FOR DME     polyethylene glycol (MIRALAX/GLYCOLAX) packet     ranitidine (ZANTAC) 150 MG tablet     senna-docusate (SENOKOT-S/PERICOLACE) 8.6-50 MG tablet     simethicone (MYLICON) 80 MG chewable tablet     zolpidem (AMBIEN) 5 MG tablet     No current facility-administered medications for this visit.      PHYSICAL EXAMINATION:  /62 (BP Location: Right arm, Patient Position: Sitting, Cuff Size: Adult Regular)   Pulse 93   Temp 99  F (37.2  C) (Oral)   Resp 16   Wt 50.6 kg (111 lb 9.6 oz)   SpO2 98%   BMI 22.54 kg/m    Wt Readings from Last 5 Encounters:   03/04/20 50.6 kg (111 lb 9.6 oz)   11/22/19 55.2 kg (121 lb 9.6 oz)   11/04/19 55.1 kg (121 lb 6.4 oz)   10/18/19 56.9 kg (125 lb 8 oz)   10/14/19 57.2 kg (126 lb)     General: Well appearing  female. No acute distress.  HEENT: Sclerae anicteric. No OP lesions, masses, or tonsilar enlargement.  Lungs: Clear bilaterally without wheezing or crackles.  Heart: Regular rate and rhythm without murmurs.  Gastrointestinal: Bowel sounds present, no tenderness to palpation, spleen tip not palpable. Exam limited by habitus.  Extremities: No lower extremity  edema.  Lymph:  No cervical, clavicular, axillary lymphadenopathy.  Neuro: Grossly non-focal.  Skin/access: No visible rash. No IV access.  Performance status: ECOG 1     LABORATORY DATA:  Recent Labs   Lab Test 03/04/20  1103 11/22/19  0659 10/18/19  0718  09/05/14  0715 09/04/14  0552 09/03/14  0734   WBC 9.0 4.7 3.7*   < > 4.6 6.5 8.0   HGB 11.6* 12.2 11.5*   < > 10.6* 10.1* 10.2*    228 142*   < > 131* 78* 58*   ANEU 6.6 2.6 2.7   < > 3.2 4.8 6.6   ABLA  --   --   --   --  0.0 0.1* 0.3*   ALYM 1.4 1.6 0.6*   < > 0.5* 0.5* 0.2*    < > = values in this interval not displayed.     Recent Labs   Lab Test 03/04/20  1103 11/22/19  0659 10/18/19  0718  10/04/19  0730  09/25/19  0621 09/24/19  0459  09/20/19  1432 09/19/19  1445  09/09/14  0825    141 144  --  134   < > 145* 145*   < > 139 138   < > 140   POTASSIUM 3.3* 3.5 3.8   < > 4.9   < > 3.5 3.5   < > 3.8 3.8   < > 3.9   CHLORIDE 108 108 109  --  103   < > 113* 113*   < > 105 105   < > 105   CO2 28 27 28  --  22   < > 26 24   < > 24 27   < > 26   BUN 13 6* 9  --  28   < > 8 7   < > 18 19   < > 10   CR 0.80 0.75 0.80  --  0.78   < > 0.81 0.83   < > 1.00 0.91   < > 0.76   EMMA 9.4 8.7 8.7  --  8.8   < > 8.3* 8.0*   < > 8.6 8.7   < > 8.6   MAG  --   --   --   --   --   --  1.8  --   --  2.1  --   --  1.9   PHOS  --   --   --   --   --   --  3.4  --   --  3.9  --   --  4.4   URIC  --   --   --   --  3.1  --   --  3.4  --  8.0*  --   --   --      --   --   --   --   --   --   --   --  161 164   < >  --     < > = values in this interval not displayed.     Recent Labs   Lab Test 03/04/20  1103 11/22/19  0659 10/18/19  0718  09/25/19  1041 09/20/19  1432  09/14/16  1208   AST 14 20 20   < >  --  19   < >  --    ALT 20 20 58*   < >  --  42   < >  --    ALKPHOS 104 67 94   < >  --  76   < >  --    BILITOTAL 0.5 1.4* 1.3   < >  --  0.7   < >  --    INR  --   --   --   --  1.05 1.00  --  1.03    < > = values in this interval not displayed.     IMAGING  DATA:  12/27/2019 MRI brain consistent with partial response     IMPRESSION AND PLAN:  Ms. Negro is a 70 year old woman with past CNS relapse of DLBCL, here for followup.      She completed steroids, rituximab, and radiation for her relapsed CNS lymphoma with a clinical and radiographic response.  There is nothing to suggest progressive lymphoma. We will plan to repeat an MRI in about 3 months to further evaluate her response to treatment.       She has been dealing with a URI that has been slow to resolve.  If she has persistent/worsening problems we could check an IgG level and consider IVIg if it is < 400 mg/dL but hopefully this won't be necessary.  She is up to date for the seasonal flu shot and pneumococcal immunizations.     She will continue to work with Dr. Flor for her general medical issues.       We will see her back in about 3 months with an MRI prior.  I reminded her to contact us if questions, concerns, or new issues arise in between visits.    Karson Dailey MD, PhD  Attending Physician, [facility]   of Medicine, [facility]  Division of Hematology, Oncology, and Transplantation  [email or web address] email  [phone] clinic  [phone] pager

## 2020-03-04 ENCOUNTER — APPOINTMENT (OUTPATIENT)
Dept: LAB | Facility: CLINIC | Age: 70
End: 2020-03-04
Attending: INTERNAL MEDICINE
Payer: COMMERCIAL

## 2020-03-04 ENCOUNTER — ONCOLOGY VISIT (OUTPATIENT)
Dept: ONCOLOGY | Facility: CLINIC | Age: 70
End: 2020-03-04
Attending: INTERNAL MEDICINE
Payer: COMMERCIAL

## 2020-03-04 VITALS
SYSTOLIC BLOOD PRESSURE: 107 MMHG | DIASTOLIC BLOOD PRESSURE: 62 MMHG | HEART RATE: 93 BPM | RESPIRATION RATE: 16 BRPM | BODY MASS INDEX: 22.54 KG/M2 | TEMPERATURE: 99 F | WEIGHT: 111.6 LBS | OXYGEN SATURATION: 98 %

## 2020-03-04 DIAGNOSIS — C83.390 CNS LYMPHOMA: Primary | ICD-10-CM

## 2020-03-04 DIAGNOSIS — C83.30 DIFFUSE LARGE B-CELL LYMPHOMA, UNSPECIFIED BODY REGION (H): ICD-10-CM

## 2020-03-04 PROBLEM — G93.9 BRAIN LESION: Status: RESOLVED | Noted: 2019-09-20 | Resolved: 2020-03-04

## 2020-03-04 PROBLEM — G93.89 BRAIN MASS: Status: RESOLVED | Noted: 2019-09-20 | Resolved: 2020-03-04

## 2020-03-04 LAB
ALBUMIN SERPL-MCNC: 3.5 G/DL (ref 3.4–5)
ALP SERPL-CCNC: 104 U/L (ref 40–150)
ALT SERPL W P-5'-P-CCNC: 20 U/L (ref 0–50)
ANION GAP SERPL CALCULATED.3IONS-SCNC: 4 MMOL/L (ref 3–14)
AST SERPL W P-5'-P-CCNC: 14 U/L (ref 0–45)
BASOPHILS # BLD AUTO: 0 10E9/L (ref 0–0.2)
BASOPHILS NFR BLD AUTO: 0.3 %
BILIRUB SERPL-MCNC: 0.5 MG/DL (ref 0.2–1.3)
BUN SERPL-MCNC: 13 MG/DL (ref 7–30)
CALCIUM SERPL-MCNC: 9.4 MG/DL (ref 8.5–10.1)
CHLORIDE SERPL-SCNC: 108 MMOL/L (ref 94–109)
CO2 SERPL-SCNC: 28 MMOL/L (ref 20–32)
CREAT SERPL-MCNC: 0.8 MG/DL (ref 0.52–1.04)
DIFFERENTIAL METHOD BLD: ABNORMAL
EOSINOPHIL # BLD AUTO: 0 10E9/L (ref 0–0.7)
EOSINOPHIL NFR BLD AUTO: 0.1 %
ERYTHROCYTE [DISTWIDTH] IN BLOOD BY AUTOMATED COUNT: 13.6 % (ref 10–15)
GFR SERPL CREATININE-BSD FRML MDRD: 76 ML/MIN/{1.73_M2}
GLUCOSE SERPL-MCNC: 110 MG/DL (ref 70–99)
HCT VFR BLD AUTO: 36.8 % (ref 35–47)
HGB BLD-MCNC: 11.6 G/DL (ref 11.7–15.7)
IMM GRANULOCYTES # BLD: 0 10E9/L (ref 0–0.4)
IMM GRANULOCYTES NFR BLD: 0.3 %
LDH SERPL L TO P-CCNC: 152 U/L (ref 81–234)
LYMPHOCYTES # BLD AUTO: 1.4 10E9/L (ref 0.8–5.3)
LYMPHOCYTES NFR BLD AUTO: 16.1 %
MCH RBC QN AUTO: 26.7 PG (ref 26.5–33)
MCHC RBC AUTO-ENTMCNC: 31.5 G/DL (ref 31.5–36.5)
MCV RBC AUTO: 85 FL (ref 78–100)
MONOCYTES # BLD AUTO: 0.8 10E9/L (ref 0–1.3)
MONOCYTES NFR BLD AUTO: 9.3 %
NEUTROPHILS # BLD AUTO: 6.6 10E9/L (ref 1.6–8.3)
NEUTROPHILS NFR BLD AUTO: 73.9 %
NRBC # BLD AUTO: 0 10*3/UL
NRBC BLD AUTO-RTO: 0 /100
PLATELET # BLD AUTO: 350 10E9/L (ref 150–450)
POTASSIUM SERPL-SCNC: 3.3 MMOL/L (ref 3.4–5.3)
PROT SERPL-MCNC: 7.3 G/DL (ref 6.8–8.8)
RBC # BLD AUTO: 4.35 10E12/L (ref 3.8–5.2)
SODIUM SERPL-SCNC: 140 MMOL/L (ref 133–144)
WBC # BLD AUTO: 9 10E9/L (ref 4–11)

## 2020-03-04 PROCEDURE — 85025 COMPLETE CBC W/AUTO DIFF WBC: CPT | Performed by: INTERNAL MEDICINE

## 2020-03-04 PROCEDURE — 36415 COLL VENOUS BLD VENIPUNCTURE: CPT

## 2020-03-04 PROCEDURE — 99213 OFFICE O/P EST LOW 20 MIN: CPT | Mod: ZP | Performed by: INTERNAL MEDICINE

## 2020-03-04 PROCEDURE — 80053 COMPREHEN METABOLIC PANEL: CPT | Performed by: INTERNAL MEDICINE

## 2020-03-04 PROCEDURE — 83615 LACTATE (LD) (LDH) ENZYME: CPT | Performed by: INTERNAL MEDICINE

## 2020-03-04 PROCEDURE — G0463 HOSPITAL OUTPT CLINIC VISIT: HCPCS | Mod: ZF

## 2020-03-04 ASSESSMENT — PAIN SCALES - GENERAL: PAINLEVEL: NO PAIN (0)

## 2020-03-04 NOTE — NURSING NOTE
Chief Complaint   Patient presents with     Blood Draw     Labs drawn via  by RN in lab. VS taken.      Traci Reyes RN,

## 2020-03-04 NOTE — NURSING NOTE
"Oncology Rooming Note    March 4, 2020 11:25 AM   Simran Negro is a 70 year old female who presents for:    Chief Complaint   Patient presents with     Blood Draw     Labs drawn via  by RN in lab. VS taken.      Oncology Clinic Visit     Return; CNS Lymphoma     Initial Vitals: /62 (BP Location: Right arm, Patient Position: Sitting, Cuff Size: Adult Regular)   Pulse 93   Temp 99  F (37.2  C) (Oral)   Resp 16   Wt 50.6 kg (111 lb 9.6 oz)   SpO2 98%   BMI 22.54 kg/m   Estimated body mass index is 22.54 kg/m  as calculated from the following:    Height as of 10/10/19: 1.499 m (4' 11\").    Weight as of this encounter: 50.6 kg (111 lb 9.6 oz). Body surface area is 1.45 meters squared.  No Pain (0) Comment: Data Unavailable   No LMP recorded. Patient is postmenopausal.  Allergies reviewed: Yes  Medications reviewed: Yes    Medications: Medication refills not needed today.  Pharmacy name entered into Sm[facility]:    [facility] - [place], [place] - [address] [facility] ([facility] ONLY [facility]) - [place], [place] - [address]    Clinical concerns:  Pt states she has a cold; stuffy nose.      Sarahi Jin CMA              "

## 2020-03-04 NOTE — LETTER
3/4/2020       RE: Simran Negro  [address] [place]   [facility] [address]     Dear Colleague,    Thank you for referring your patient, Simran Negro, to the [facility]. Please see a copy of my visit note below.    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      HISTORY OF PRESENT ILLNESS:  Ms. Negro is a 70 year old [facility] [place] here for follow up of CNS relapse of DLBCL.  To summarize her course, she was diagnosed with DLBCL of the cervix in 06/2007 and was treated with 6 cycles of R-CHOP.  She attained a CR, but in early 2014 developed difficulty concentrating, gait instability, and right-sided headaches with some blurry vision.  Workup revealed an intra-axial right parietal mass.  Stereotactic biopsy on 03/19/2014 which was diagnostic for diffuse large B-cell lymphoma, non-germinal center type.  Immunophenotypically this was similar to her previous diffuse large B-cell lymphoma, and this was presumed CNS relapse.  She was started on MT-R (methotrexate, temozolomide and rituximab) therapy in 03/2014 with a complete clinical and radiographic response and then completed EA consolidation in 8/2014.  She developed worsening fatigue, some confusion, and a routine brain MRI in 09/2019 revealed two frontal lobe lesions.  Workup was negative for other sites of disease.  She was admitted for workup, CSF was negative, and she and her family ultimately declined diagnostic biopsy.  She was not interested in more intensive chemotherapy which is reasonable.  She was empirically treated with systemic steroids, rituximab, and salvage radiation therapy to try for maximal benefit with minimal risk of toxicity.  It is difficult to determine how much insight she has into her situation.   Repeat MRI 12/27/2019 showed a partial response.  She is here for ongoing management.     She was seen with a [facility]  today.  She has been dealing with a cold with a cough and  sore throat that has been slow to resolve.  No fevers, chills, or night sweats.  No headache, vision changes, focal weakness or sensory changes.  No dyspnea or chest pain.  Normal bowel function.  No urinary complaints.  No bleeding, bruising, or skin rashes.  No pain.  No lumps or bumps.  Overall, managing well.     REVIEW OF SYSTEMS:  A complete review of systems was negative other than noted.    MEDICATIONS:  Current Outpatient Medications   Medication     Acetaminophen (TYLENOL PO)     acetaminophen-codeine (TYLENOL #3) 300-30 MG tablet     allopurinol (ZYLOPRIM) 300 MG tablet     ammonium lactate (AMLACTIN) 12 % cream     aspirin 81 MG EC tablet     calcium carbonate-vitamin D (OYSTER SHELL CALCIUM/D) 500-200 MG-UNIT tablet     cetirizine (ZYRTEC) 10 MG tablet     ciprofloxacin (CIPRO) 500 MG tablet     CYANOCOBALAMIN PO     fluticasone (FLONASE) 50 MCG/ACT nasal spray     magnesium gluconate (MAGONATE) 500 MG tablet     Multiple Vitamin (MULTI-VITAMINS) TABS     omeprazole (PRILOSEC) 20 MG DR capsule     ORDER FOR DME     polyethylene glycol (MIRALAX/GLYCOLAX) packet     ranitidine (ZANTAC) 150 MG tablet     senna-docusate (SENOKOT-S/PERICOLACE) 8.6-50 MG tablet     simethicone (MYLICON) 80 MG chewable tablet     zolpidem (AMBIEN) 5 MG tablet     No current facility-administered medications for this visit.      PHYSICAL EXAMINATION:  /62 (BP Location: Right arm, Patient Position: Sitting, Cuff Size: Adult Regular)   Pulse 93   Temp 99  F (37.2  C) (Oral)   Resp 16   Wt 50.6 kg (111 lb 9.6 oz)   SpO2 98%   BMI 22.54 kg/m     Wt Readings from Last 5 Encounters:   03/04/20 50.6 kg (111 lb 9.6 oz)   11/22/19 55.2 kg (121 lb 9.6 oz)   11/04/19 55.1 kg (121 lb 6.4 oz)   10/18/19 56.9 kg (125 lb 8 oz)   10/14/19 57.2 kg (126 lb)     General: Well appearing  female. No acute distress.  HEENT: Sclerae anicteric. No OP lesions, masses, or tonsilar enlargement.  Lungs: Clear bilaterally without wheezing  or crackles.  Heart: Regular rate and rhythm without murmurs.  Gastrointestinal: Bowel sounds present, no tenderness to palpation, spleen tip not palpable. Exam limited by habitus.  Extremities: No lower extremity edema.  Lymph:  No cervical, clavicular, axillary lymphadenopathy.  Neuro: Grossly non-focal.  Skin/access: No visible rash. No IV access.  Performance status: ECOG 1     LABORATORY DATA:  Recent Labs   Lab Test 03/04/20  1103 11/22/19  0659 10/18/19  0718  09/05/14  0715 09/04/14  0552 09/03/14  0734   WBC 9.0 4.7 3.7*   < > 4.6 6.5 8.0   HGB 11.6* 12.2 11.5*   < > 10.6* 10.1* 10.2*    228 142*   < > 131* 78* 58*   ANEU 6.6 2.6 2.7   < > 3.2 4.8 6.6   ABLA  --   --   --   --  0.0 0.1* 0.3*   ALYM 1.4 1.6 0.6*   < > 0.5* 0.5* 0.2*    < > = values in this interval not displayed.     Recent Labs   Lab Test 03/04/20  1103 11/22/19  0659 10/18/19  0718  10/04/19  0730  09/25/19  0621 09/24/19  0459  09/20/19  1432 09/19/19  1445  09/09/14  0825    141 144  --  134   < > 145* 145*   < > 139 138   < > 140   POTASSIUM 3.3* 3.5 3.8   < > 4.9   < > 3.5 3.5   < > 3.8 3.8   < > 3.9   CHLORIDE 108 108 109  --  103   < > 113* 113*   < > 105 105   < > 105   CO2 28 27 28  --  22   < > 26 24   < > 24 27   < > 26   BUN 13 6* 9  --  28   < > 8 7   < > 18 19   < > 10   CR 0.80 0.75 0.80  --  0.78   < > 0.81 0.83   < > 1.00 0.91   < > 0.76   EMMA 9.4 8.7 8.7  --  8.8   < > 8.3* 8.0*   < > 8.6 8.7   < > 8.6   MAG  --   --   --   --   --   --  1.8  --   --  2.1  --   --  1.9   PHOS  --   --   --   --   --   --  3.4  --   --  3.9  --   --  4.4   URIC  --   --   --   --  3.1  --   --  3.4  --  8.0*  --   --   --      --   --   --   --   --   --   --   --  161 164   < >  --     < > = values in this interval not displayed.     Recent Labs   Lab Test 03/04/20  1103 11/22/19  0659 10/18/19  0718  09/25/19  1041 09/20/19  1432  09/14/16  1208   AST 14 20 20   < >  --  19   < >  --    ALT 20 20 58*   < >  --  42   <  >  --    ALKPHOS 104 67 94   < >  --  76   < >  --    BILITOTAL 0.5 1.4* 1.3   < >  --  0.7   < >  --    INR  --   --   --   --  1.05 1.00  --  1.03    < > = values in this interval not displayed.     IMAGING DATA:  12/27/2019 MRI brain consistent with partial response     IMPRESSION AND PLAN:  Ms. Negro is a 70 year old woman with past CNS relapse of DLBCL, here for followup.      She completed steroids, rituximab, and radiation for her relapsed CNS lymphoma with a clinical and radiographic response.  There is nothing to suggest progressive lymphoma. We will plan to repeat an MRI in about 3 months to further evaluate her response to treatment.       She has been dealing with a URI that has been slow to resolve.  If she has persistent/worsening problems we could check an IgG level and consider IVIg if it is < 400 mg/dL but hopefully this won't be necessary.  She is up to date for the seasonal flu shot and pneumococcal immunizations.     She will continue to work with Dr. Flor for her general medical issues.       We will see her back in about 3 months with an MRI prior.  I reminded her to contact us if questions, concerns, or new issues arise in between visits.    Karson Dailey MD, PhD  Attending Physician, [facility]   of Medicine, [facility]  Division of Hematology, Oncology, and Transplantation  [email or web address]  [phone] [facility]  [phone] pager

## 2020-06-08 ENCOUNTER — HOSPITAL ENCOUNTER (OUTPATIENT)
Dept: MRI IMAGING | Facility: CLINIC | Age: 70
End: 2020-06-08
Attending: INTERNAL MEDICINE
Payer: COMMERCIAL

## 2020-06-08 VITALS
TEMPERATURE: 98.7 F | HEART RATE: 83 BPM | SYSTOLIC BLOOD PRESSURE: 110 MMHG | BODY MASS INDEX: 24.74 KG/M2 | WEIGHT: 122.5 LBS | RESPIRATION RATE: 16 BRPM | OXYGEN SATURATION: 99 % | DIASTOLIC BLOOD PRESSURE: 69 MMHG

## 2020-06-08 DIAGNOSIS — C83.390 CNS LYMPHOMA: ICD-10-CM

## 2020-06-08 LAB
ALBUMIN SERPL-MCNC: 4 G/DL (ref 3.4–5)
ALP SERPL-CCNC: 72 U/L (ref 40–150)
ALT SERPL W P-5'-P-CCNC: 25 U/L (ref 0–50)
ANION GAP SERPL CALCULATED.3IONS-SCNC: 4 MMOL/L (ref 3–14)
AST SERPL W P-5'-P-CCNC: 15 U/L (ref 0–45)
BASOPHILS # BLD AUTO: 0 10E9/L (ref 0–0.2)
BASOPHILS NFR BLD AUTO: 0.4 %
BILIRUB SERPL-MCNC: 0.8 MG/DL (ref 0.2–1.3)
BUN SERPL-MCNC: 24 MG/DL (ref 7–30)
CALCIUM SERPL-MCNC: 9.2 MG/DL (ref 8.5–10.1)
CHLORIDE SERPL-SCNC: 108 MMOL/L (ref 94–109)
CO2 SERPL-SCNC: 28 MMOL/L (ref 20–32)
CREAT SERPL-MCNC: 0.9 MG/DL (ref 0.52–1.04)
DIFFERENTIAL METHOD BLD: NORMAL
EOSINOPHIL # BLD AUTO: 0.1 10E9/L (ref 0–0.7)
EOSINOPHIL NFR BLD AUTO: 0.7 %
ERYTHROCYTE [DISTWIDTH] IN BLOOD BY AUTOMATED COUNT: 12 % (ref 10–15)
GFR SERPL CREATININE-BSD FRML MDRD: 65 ML/MIN/{1.73_M2}
GLUCOSE SERPL-MCNC: 90 MG/DL (ref 70–99)
HCT VFR BLD AUTO: 43.1 % (ref 35–47)
HGB BLD-MCNC: 13.7 G/DL (ref 11.7–15.7)
IMM GRANULOCYTES # BLD: 0 10E9/L (ref 0–0.4)
IMM GRANULOCYTES NFR BLD: 0.3 %
LDH SERPL L TO P-CCNC: 175 U/L (ref 81–234)
LYMPHOCYTES # BLD AUTO: 2.7 10E9/L (ref 0.8–5.3)
LYMPHOCYTES NFR BLD AUTO: 35.3 %
MCH RBC QN AUTO: 27.6 PG (ref 26.5–33)
MCHC RBC AUTO-ENTMCNC: 31.8 G/DL (ref 31.5–36.5)
MCV RBC AUTO: 87 FL (ref 78–100)
MONOCYTES # BLD AUTO: 0.6 10E9/L (ref 0–1.3)
MONOCYTES NFR BLD AUTO: 8.4 %
NEUTROPHILS # BLD AUTO: 4.2 10E9/L (ref 1.6–8.3)
NEUTROPHILS NFR BLD AUTO: 54.9 %
NRBC # BLD AUTO: 0 10*3/UL
NRBC BLD AUTO-RTO: 0 /100
PLATELET # BLD AUTO: 271 10E9/L (ref 150–450)
POTASSIUM SERPL-SCNC: 4.5 MMOL/L (ref 3.4–5.3)
PROT SERPL-MCNC: 7.4 G/DL (ref 6.8–8.8)
RBC # BLD AUTO: 4.97 10E12/L (ref 3.8–5.2)
SODIUM SERPL-SCNC: 140 MMOL/L (ref 133–144)
WBC # BLD AUTO: 7.6 10E9/L (ref 4–11)

## 2020-06-08 PROCEDURE — 70553 MRI BRAIN STEM W/O & W/DYE: CPT

## 2020-06-08 PROCEDURE — 36415 COLL VENOUS BLD VENIPUNCTURE: CPT

## 2020-06-08 PROCEDURE — 83615 LACTATE (LD) (LDH) ENZYME: CPT | Performed by: INTERNAL MEDICINE

## 2020-06-08 PROCEDURE — 80053 COMPREHEN METABOLIC PANEL: CPT | Performed by: INTERNAL MEDICINE

## 2020-06-08 PROCEDURE — 25500064 ZZH RX 255 OP 636: Performed by: INTERNAL MEDICINE

## 2020-06-08 PROCEDURE — A9585 GADOBUTROL INJECTION: HCPCS | Performed by: INTERNAL MEDICINE

## 2020-06-08 PROCEDURE — 85025 COMPLETE CBC W/AUTO DIFF WBC: CPT | Performed by: INTERNAL MEDICINE

## 2020-06-08 RX ORDER — GADOBUTROL 604.72 MG/ML
5 INJECTION INTRAVENOUS ONCE
Status: COMPLETED | OUTPATIENT
Start: 2020-06-08 | End: 2020-06-08

## 2020-06-08 RX ADMIN — GADOBUTROL 5 ML: 604.72 INJECTION INTRAVENOUS at 15:04

## 2020-06-08 ASSESSMENT — PAIN SCALES - GENERAL: PAINLEVEL: NO PAIN (0)

## 2020-06-08 NOTE — NURSING NOTE
Chief Complaint   Patient presents with     Blood Draw     labs drawn with vpt by rn.  vs taken     (Video  present throughout appt).  Labs drawn with vpt by rn.  Pt tolerated well.  VS taken.     Hailey Huang RN    Add:  1st fl  is aware that a cab has been called to get pt to hospital for MRI; they know where she is waiting in the lobby.  This nurse called MRI at hospital & let them know she'll be giving an english note to information desk at hosp main lobby explaining her situation (written by this nurse).       Okay to generate note as requested. She has plantar fasciitis. Foot wear should provide adequate support.

## 2020-09-22 DIAGNOSIS — C83.30 DIFFUSE LARGE B-CELL LYMPHOMA, UNSPECIFIED BODY REGION (H): ICD-10-CM

## 2020-09-22 DIAGNOSIS — C83.390 CNS LYMPHOMA: Primary | ICD-10-CM

## 2020-09-22 NOTE — PROGRESS NOTES
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      HISTORY OF PRESENT ILLNESS:  Ms. Negro is a 70 year old [facility] [place] here for follow up of CNS relapse of DLBCL.  To summarize her course, she was diagnosed with DLBCL of the cervix in 06/2007 and was treated with 6 cycles of R-CHOP.  She attained a CR, but in early 2014 developed difficulty concentrating, gait instability, and right-sided headaches with some blurry vision.  Workup revealed an intra-axial right parietal mass.  Stereotactic biopsy on 03/19/2014 which was diagnostic for diffuse large B-cell lymphoma, non-germinal center type.  Immunophenotypically this was similar to her previous diffuse large B-cell lymphoma, and this was presumed CNS relapse.  She was started on MT-R (methotrexate, temozolomide and rituximab) therapy in 03/2014 with a complete clinical and radiographic response and then completed EA consolidation in 8/2014.  She developed worsening fatigue, some confusion, and a routine brain MRI in 09/2019 revealed two frontal lobe lesions.  Workup was negative for other sites of disease.  She was admitted for workup, CSF was negative, and she and her family ultimately declined diagnostic biopsy.  She was not interested in more intensive chemotherapy which is reasonable.  She was empirically treated with systemic steroids, rituximab, and salvage radiation therapy to try for maximal benefit with minimal risk of toxicity.  It has been difficult to determine how much insight she has into her situation.  Last MRI in 06/2020 showed interval improvement consistent with ongoing response.  Visit for ongoing management.     She was seen alone today in person with an iPad [facility] [place] [facility]  today.  She continues to struggle with fatigue, gait instability, and pain after a recent mechanical fall with a skull fracture.  She has a PCA and family support and is working with her primary care clinic for her complicated  medical needs.  No fevers, chills, or night sweats.  No headache, vision changes, or new focal weakness or sensory changes.  No dyspnea or chest pain.  Normal bowel function.  No urinary complaints.  No bleeding, bruising, or skin rashes.  No pain.  No lumps or bumps.     REVIEW OF SYSTEMS:  A complete review of systems was negative other than noted.    MEDICATIONS:  Current Outpatient Medications   Medication     Acetaminophen (TYLENOL PO)     acetaminophen-codeine (TYLENOL #3) 300-30 MG tablet     allopurinol (ZYLOPRIM) 300 MG tablet     ammonium lactate (AMLACTIN) 12 % cream     aspirin 81 MG EC tablet     calcium carbonate-vitamin D (OYSTER SHELL CALCIUM/D) 500-200 MG-UNIT tablet     cetirizine (ZYRTEC) 10 MG tablet     ciprofloxacin (CIPRO) 500 MG tablet     CYANOCOBALAMIN PO     fluticasone (FLONASE) 50 MCG/ACT nasal spray     magnesium gluconate (MAGONATE) 500 MG tablet     Multiple Vitamin (MULTI-VITAMINS) TABS     omeprazole (PRILOSEC) 20 MG DR capsule     ORDER FOR DME     polyethylene glycol (MIRALAX/GLYCOLAX) packet     ranitidine (ZANTAC) 150 MG tablet     senna-docusate (SENOKOT-S/PERICOLACE) 8.6-50 MG tablet     simethicone (MYLICON) 80 MG chewable tablet     zolpidem (AMBIEN) 5 MG tablet     No current facility-administered medications for this visit.      PHYSICAL EXAMINATION:  /55   Pulse 82   Temp 98.6  F (37  C)   Resp 16   Wt 48.5 kg (107 lb)   SpO2 98%   BMI 21.61 kg/m    Wt Readings from Last 5 Encounters:   09/23/20 48.5 kg (107 lb)   09/23/20 48.7 kg (107 lb 4.8 oz)   06/08/20 55.6 kg (122 lb 8 oz)   03/04/20 50.6 kg (111 lb 9.6 oz)   11/22/19 55.2 kg (121 lb 9.6 oz)     General: Well appearing  female. No acute distress.  HEENT: Sclerae anicteric. No OP lesions, masses, or tonsilar enlargement.  Lungs: Clear bilaterally without wheezing or crackles.  Heart: Regular rate and rhythm without murmurs.  Gastrointestinal: Bowel sounds present, no tenderness to palpation, spleen  tip not palpable. Exam limited by habitus.  Extremities: No lower extremity edema.  Lymph:  No cervical, clavicular, axillary lymphadenopathy.  Neuro: Grossly non-focal.  Skin/access: No visible rash. No IV access.  Performance status: ECOG 1     LABORATORY DATA:  Recent Labs   Lab Test 09/23/20  1348 06/08/20  1253 03/04/20  1103  09/05/14  0715 09/04/14  0552 09/03/14  0734   WBC 6.0 7.6 9.0   < > 4.6 6.5 8.0   HGB 13.3 13.7 11.6*   < > 10.6* 10.1* 10.2*    271 350   < > 131* 78* 58*   ANEU 3.7 4.2 6.6   < > 3.2 4.8 6.6   ABLA  --   --   --   --  0.0 0.1* 0.3*   ALYM 1.6 2.7 1.4   < > 0.5* 0.5* 0.2*    < > = values in this interval not displayed.     Recent Labs   Lab Test 09/23/20  1348 06/08/20  1253 03/04/20  1103  10/04/19  0730  09/25/19  0621 09/24/19  0459  09/20/19  1432  09/09/14  0825   * 140 140   < > 134   < > 145* 145*   < > 139   < > 140   POTASSIUM 3.7 4.5 3.3*   < > 4.9   < > 3.5 3.5   < > 3.8   < > 3.9   CHLORIDE 95 108 108   < > 103   < > 113* 113*   < > 105   < > 105   CO2 28 28 28   < > 22   < > 26 24   < > 24   < > 26   BUN 14 24 13   < > 28   < > 8 7   < > 18   < > 10   CR 0.84 0.90 0.80   < > 0.78   < > 0.81 0.83   < > 1.00   < > 0.76   EMMA 9.2 9.2 9.4   < > 8.8   < > 8.3* 8.0*   < > 8.6   < > 8.6   MAG  --   --   --   --   --   --  1.8  --   --  2.1  --  1.9   PHOS  --   --   --   --   --   --  3.4  --   --  3.9  --  4.4   URIC  --   --   --   --  3.1  --   --  3.4  --  8.0*  --   --     175 152  --   --   --   --   --   --  161   < >  --     < > = values in this interval not displayed.     Recent Labs   Lab Test 06/08/20  1253 03/04/20  1103 11/22/19  0659  09/25/19  1041 09/20/19  1432  09/14/16  1208   AST 15 14 20   < >  --  19   < >  --    ALT 25 20 20   < >  --  42   < >  --    ALKPHOS 72 104 67   < >  --  76   < >  --    BILITOTAL 0.8 0.5 1.4*   < >  --  0.7   < >  --    INR  --   --   --   --  1.05 1.00  --  1.03    < > = values in this interval not displayed.      IMAGING DATA:  06/08/2020 MRI brain interval improvement consistent with ongoing response, no evidence of progressive/recurrent lymphoma  9/13/2020 CT head report with non-displaced left occipital bone fracture and mild posterior parietal soft tissue swelling - nothing to suggest progressive lymphoma     IMPRESSION AND PLAN:  Ms. Negro is a 70 year old woman with past CNS relapse of DLBCL, here for followup.      She completed steroids, rituximab, and radiation for her relapsed CNS lymphoma with a clinical and radiographic response.  Labs look good.  There is no obvious recurrent/progressive lymphoma on CT or based on her clinical issues.  We will plan to repeat an MRI in about another 6-9 months to continue to follow response to treatment.       She has no active infections.  She should get a seasonal flu shot.  She is up to date with pneumococcal immunizations.  I recommended the Shingrix vaccine as well that she can get at her local pharmacy or primary clinic.     She will continue to work with Dr. Flor for her general medical issues.  She continues to struggle with balance issues and support at home - it is difficult to assess her home situation and no family is present today.  She has a PCA and some family support as well as home PT/OT based on the recent discharge summary.  I encouraged her to work with her primary clinic to optimize her home situation.  If she has persistent or worsening issues we could check an MRI brain sooner than 6-9 months.     We will arrange another visit in about 3 months.  I reminded her to contact us if questions, concerns, or new issues arise in between visits.    Karson Dailey MD, PhD  Attending Physician, [facility]   of Medicine, [facility]  Division of Hematology, Oncology, and Transplantation  [phone] [facility]

## 2020-09-23 ENCOUNTER — ONCOLOGY VISIT (OUTPATIENT)
Dept: ONCOLOGY | Facility: CLINIC | Age: 70
End: 2020-09-23
Attending: INTERNAL MEDICINE
Payer: COMMERCIAL

## 2020-09-23 VITALS
WEIGHT: 107 LBS | DIASTOLIC BLOOD PRESSURE: 55 MMHG | BODY MASS INDEX: 21.61 KG/M2 | OXYGEN SATURATION: 98 % | SYSTOLIC BLOOD PRESSURE: 105 MMHG | TEMPERATURE: 98.6 F | RESPIRATION RATE: 16 BRPM | HEART RATE: 82 BPM

## 2020-09-23 VITALS
SYSTOLIC BLOOD PRESSURE: 105 MMHG | DIASTOLIC BLOOD PRESSURE: 55 MMHG | WEIGHT: 107.3 LBS | OXYGEN SATURATION: 98 % | BODY MASS INDEX: 21.67 KG/M2 | HEART RATE: 82 BPM | RESPIRATION RATE: 16 BRPM | TEMPERATURE: 98.6 F

## 2020-09-23 DIAGNOSIS — C83.390 CENTRAL NERVOUS SYSTEM LYMPHOMA: Primary | ICD-10-CM

## 2020-09-23 DIAGNOSIS — C83.30 DIFFUSE LARGE B-CELL LYMPHOMA, UNSPECIFIED BODY REGION (H): ICD-10-CM

## 2020-09-23 DIAGNOSIS — C83.390 CNS LYMPHOMA: ICD-10-CM

## 2020-09-23 LAB
ANION GAP SERPL CALCULATED.3IONS-SCNC: 7 MMOL/L (ref 3–14)
BASOPHILS # BLD AUTO: 0 10E9/L (ref 0–0.2)
BASOPHILS NFR BLD AUTO: 0.2 %
BUN SERPL-MCNC: 14 MG/DL (ref 7–30)
CALCIUM SERPL-MCNC: 9.2 MG/DL (ref 8.5–10.1)
CHLORIDE SERPL-SCNC: 95 MMOL/L (ref 94–109)
CO2 SERPL-SCNC: 28 MMOL/L (ref 20–32)
CREAT SERPL-MCNC: 0.84 MG/DL (ref 0.52–1.04)
DIFFERENTIAL METHOD BLD: NORMAL
EOSINOPHIL # BLD AUTO: 0 10E9/L (ref 0–0.7)
EOSINOPHIL NFR BLD AUTO: 0.7 %
ERYTHROCYTE [DISTWIDTH] IN BLOOD BY AUTOMATED COUNT: 11.9 % (ref 10–15)
GFR SERPL CREATININE-BSD FRML MDRD: 71 ML/MIN/{1.73_M2}
GLUCOSE SERPL-MCNC: 171 MG/DL (ref 70–99)
HCT VFR BLD AUTO: 38.5 % (ref 35–47)
HGB BLD-MCNC: 13.3 G/DL (ref 11.7–15.7)
IMM GRANULOCYTES # BLD: 0.1 10E9/L (ref 0–0.4)
IMM GRANULOCYTES NFR BLD: 0.8 %
LDH SERPL L TO P-CCNC: 185 U/L (ref 81–234)
LYMPHOCYTES # BLD AUTO: 1.6 10E9/L (ref 0.8–5.3)
LYMPHOCYTES NFR BLD AUTO: 26.7 %
MCH RBC QN AUTO: 28.5 PG (ref 26.5–33)
MCHC RBC AUTO-ENTMCNC: 34.5 G/DL (ref 31.5–36.5)
MCV RBC AUTO: 83 FL (ref 78–100)
MONOCYTES # BLD AUTO: 0.6 10E9/L (ref 0–1.3)
MONOCYTES NFR BLD AUTO: 9.6 %
NEUTROPHILS # BLD AUTO: 3.7 10E9/L (ref 1.6–8.3)
NEUTROPHILS NFR BLD AUTO: 62 %
NRBC # BLD AUTO: 0 10*3/UL
NRBC BLD AUTO-RTO: 0 /100
PLATELET # BLD AUTO: 268 10E9/L (ref 150–450)
POTASSIUM SERPL-SCNC: 3.7 MMOL/L (ref 3.4–5.3)
RBC # BLD AUTO: 4.66 10E12/L (ref 3.8–5.2)
SODIUM SERPL-SCNC: 130 MMOL/L (ref 133–144)
WBC # BLD AUTO: 6 10E9/L (ref 4–11)

## 2020-09-23 PROCEDURE — 36415 COLL VENOUS BLD VENIPUNCTURE: CPT

## 2020-09-23 PROCEDURE — 83615 LACTATE (LD) (LDH) ENZYME: CPT | Performed by: INTERNAL MEDICINE

## 2020-09-23 PROCEDURE — G0463 HOSPITAL OUTPT CLINIC VISIT: HCPCS | Mod: ZF

## 2020-09-23 PROCEDURE — 85025 COMPLETE CBC W/AUTO DIFF WBC: CPT | Performed by: INTERNAL MEDICINE

## 2020-09-23 PROCEDURE — 80048 BASIC METABOLIC PNL TOTAL CA: CPT | Performed by: INTERNAL MEDICINE

## 2020-09-23 PROCEDURE — 99214 OFFICE O/P EST MOD 30 MIN: CPT | Mod: ZP | Performed by: INTERNAL MEDICINE

## 2020-09-23 ASSESSMENT — PAIN SCALES - GENERAL
PAINLEVEL: EXTREME PAIN (8)
PAINLEVEL: NO PAIN (0)

## 2020-09-23 NOTE — NURSING NOTE
Chief Complaint   Patient presents with     Blood Draw     labs drawn with vpt by rn.  vs taken     Labs drawn with vpt by rn.  Pt tolerated well.  VS taken.  Pt checked in for next appt.    Hailey Huang RN

## 2020-09-23 NOTE — NURSING NOTE
"Oncology Rooming Note    September 23, 2020 1:59 PM   Simran Negro is a 70 year old female who presents for:    Chief Complaint   Patient presents with     Oncology Clinic Visit     CNS lymphoma (H)     Initial Vitals: /55   Pulse 82   Temp 98.6  F (37  C)   Resp 16   Wt 48.5 kg (107 lb)   SpO2 98%   BMI 21.61 kg/m   Estimated body mass index is 21.61 kg/m  as calculated from the following:    Height as of 10/10/19: 1.499 m (4' 11\").    Weight as of this encounter: 48.5 kg (107 lb). Body surface area is 1.42 meters squared.  No Pain (0) Comment: Data Unavailable   No LMP recorded. Patient is postmenopausal.  Allergies reviewed: Yes  Medications reviewed: Yes    Medications: Medication refills not needed today.  Pharmacy name entered into Float: [place]:    [facility] - [place], [place] - [address] [facility] (USE ONLY [facility]) - [place], [place] - [address] [place] [address]    Clinical concerns: Patient stated yesterday she had a fall, experiencing head and back pain Dr. Dailey was notified.      Micaela Webster MA              "

## 2020-09-23 NOTE — LETTER
9/23/2020         RE: Simran Negro  [address] [place]   [facility] [address]        Dear Colleague,    Thank you for referring your patient, Simran Negro, to the [facility]. Please see a copy of my visit note below.    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      HISTORY OF PRESENT ILLNESS:  Ms. Negro is a 70 year old [facility] female here for follow up of CNS relapse of DLBCL.  To summarize her course, she was diagnosed with DLBCL of the cervix in 06/2007 and was treated with 6 cycles of R-CHOP.  She attained a CR, but in early 2014 developed difficulty concentrating, gait instability, and right-sided headaches with some blurry vision.  Workup revealed an intra-axial right parietal mass.  Stereotactic biopsy on 03/19/2014 which was diagnostic for diffuse large B-cell lymphoma, non-germinal center type.  Immunophenotypically this was similar to her previous diffuse large B-cell lymphoma, and this was presumed CNS relapse.  She was started on MT-R (methotrexate, temozolomide and rituximab) therapy in 03/2014 with a complete clinical and radiographic response and then completed EA consolidation in 8/2014.  She developed worsening fatigue, some confusion, and a routine brain MRI in 09/2019 revealed two frontal lobe lesions.  Workup was negative for other sites of disease.  She was admitted for workup, CSF was negative, and she and her family ultimately declined diagnostic biopsy.  She was not interested in more intensive chemotherapy which is reasonable.  She was empirically treated with systemic steroids, rituximab, and salvage radiation therapy to try for maximal benefit with minimal risk of toxicity.  It has been difficult to determine how much insight she has into her situation.  Last MRI in 06/2020 showed interval improvement consistent with ongoing response.  Visit for ongoing management.     She was seen alone today in person with an iPad Gradient X language   today.  She continues to struggle with fatigue, gait instability, and pain after a recent mechanical fall with a skull fracture.  She has a PCA and family support and is working with her primary care clinic for her complicated medical needs.  No fevers, chills, or night sweats.  No headache, vision changes, or new focal weakness or sensory changes.  No dyspnea or chest pain.  Normal bowel function.  No urinary complaints.  No bleeding, bruising, or skin rashes.  No pain.  No lumps or bumps.     REVIEW OF SYSTEMS:  A complete review of systems was negative other than noted.    MEDICATIONS:  Current Outpatient Medications   Medication     Acetaminophen (TYLENOL PO)     acetaminophen-codeine (TYLENOL #3) 300-30 MG tablet     allopurinol (ZYLOPRIM) 300 MG tablet     ammonium lactate (AMLACTIN) 12 % cream     aspirin 81 MG EC tablet     calcium carbonate-vitamin D (OYSTER SHELL CALCIUM/D) 500-200 MG-UNIT tablet     cetirizine (ZYRTEC) 10 MG tablet     ciprofloxacin (CIPRO) 500 MG tablet     CYANOCOBALAMIN PO     fluticasone (FLONASE) 50 MCG/ACT nasal spray     magnesium gluconate (MAGONATE) 500 MG tablet     Multiple Vitamin (MULTI-VITAMINS) TABS     omeprazole (PRILOSEC) 20 MG DR capsule     ORDER FOR DME     polyethylene glycol (MIRALAX/GLYCOLAX) packet     ranitidine (ZANTAC) 150 MG tablet     senna-docusate (SENOKOT-S/PERICOLACE) 8.6-50 MG tablet     simethicone (MYLICON) 80 MG chewable tablet     zolpidem (AMBIEN) 5 MG tablet     No current facility-administered medications for this visit.      PHYSICAL EXAMINATION:  /55   Pulse 82   Temp 98.6  F (37  C)   Resp 16   Wt 48.5 kg (107 lb)   SpO2 98%   BMI 21.61 kg/m    Wt Readings from Last 5 Encounters:   09/23/20 48.5 kg (107 lb)   09/23/20 48.7 kg (107 lb 4.8 oz)   06/08/20 55.6 kg (122 lb 8 oz)   03/04/20 50.6 kg (111 lb 9.6 oz)   11/22/19 55.2 kg (121 lb 9.6 oz)     General: Well appearing  female. No acute distress.  HEENT: Sclerae  anicteric. No OP lesions, masses, or tonsilar enlargement.  Lungs: Clear bilaterally without wheezing or crackles.  Heart: Regular rate and rhythm without murmurs.  Gastrointestinal: Bowel sounds present, no tenderness to palpation, spleen tip not palpable. Exam limited by habitus.  Extremities: No lower extremity edema.  Lymph:  No cervical, clavicular, axillary lymphadenopathy.  Neuro: Grossly non-focal.  Skin/access: No visible rash. No IV access.  Performance status: ECOG 1     LABORATORY DATA:  Recent Labs   Lab Test 09/23/20  1348 06/08/20  1253 03/04/20  1103  09/05/14  0715 09/04/14  0552 09/03/14  0734   WBC 6.0 7.6 9.0   < > 4.6 6.5 8.0   HGB 13.3 13.7 11.6*   < > 10.6* 10.1* 10.2*    271 350   < > 131* 78* 58*   ANEU 3.7 4.2 6.6   < > 3.2 4.8 6.6   ABLA  --   --   --   --  0.0 0.1* 0.3*   ALYM 1.6 2.7 1.4   < > 0.5* 0.5* 0.2*    < > = values in this interval not displayed.     Recent Labs   Lab Test 09/23/20  1348 06/08/20  1253 03/04/20  1103  10/04/19  0730  09/25/19  0621 09/24/19  0459  09/20/19  1432  09/09/14  0825   * 140 140   < > 134   < > 145* 145*   < > 139   < > 140   POTASSIUM 3.7 4.5 3.3*   < > 4.9   < > 3.5 3.5   < > 3.8   < > 3.9   CHLORIDE 95 108 108   < > 103   < > 113* 113*   < > 105   < > 105   CO2 28 28 28   < > 22   < > 26 24   < > 24   < > 26   BUN 14 24 13   < > 28   < > 8 7   < > 18   < > 10   CR 0.84 0.90 0.80   < > 0.78   < > 0.81 0.83   < > 1.00   < > 0.76   EMMA 9.2 9.2 9.4   < > 8.8   < > 8.3* 8.0*   < > 8.6   < > 8.6   MAG  --   --   --   --   --   --  1.8  --   --  2.1  --  1.9   PHOS  --   --   --   --   --   --  3.4  --   --  3.9  --  4.4   URIC  --   --   --   --  3.1  --   --  3.4  --  8.0*  --   --     175 152  --   --   --   --   --   --  161   < >  --     < > = values in this interval not displayed.     Recent Labs   Lab Test 06/08/20  1253 03/04/20  1103 11/22/19  0659  09/25/19  1041 09/20/19  1432  09/14/16  1208   AST 15 14 20   < >  --  19   <  >  --    ALT 25 20 20   < >  --  42   < >  --    ALKPHOS 72 104 67   < >  --  76   < >  --    BILITOTAL 0.8 0.5 1.4*   < >  --  0.7   < >  --    INR  --   --   --   --  1.05 1.00  --  1.03    < > = values in this interval not displayed.     IMAGING DATA:  06/08/2020 MRI brain interval improvement consistent with ongoing response, no evidence of progressive/recurrent lymphoma  9/13/2020 CT head report with non-displaced left occipital bone fracture and mild posterior parietal soft tissue swelling - nothing to suggest progressive lymphoma     IMPRESSION AND PLAN:  Ms. Negro is a 70 year old woman with past CNS relapse of DLBCL, here for followup.      She completed steroids, rituximab, and radiation for her relapsed CNS lymphoma with a clinical and radiographic response.  Labs look good.  There is no obvious recurrent/progressive lymphoma on CT or based on her clinical issues.  We will plan to repeat an MRI in about another 6-9 months to continue to follow response to treatment.       She has no active infections.  She should get a seasonal flu shot.  She is up to date with pneumococcal immunizations.  I recommended the Shingrix vaccine as well that she can get at her local pharmacy or primary clinic.     She will continue to work with Dr. Flor for her general medical issues.  She continues to struggle with balance issues and support at home - it is difficult to assess her home situation and no family is present today.  She has a PCA and some family support as well as home PT/OT based on the recent discharge summary.  I encouraged her to work with her primary clinic to optimize her home situation.  If she has persistent or worsening issues we could check an MRI brain sooner than 6-9 months.     We will arrange another visit in about 3 months.  I reminded her to contact us if questions, concerns, or new issues arise in between visits.    Karson Dailey MD, PhD  Attending Physician, [facility]  Care   of Medicine, [facility]  Division of Hematology, Oncology, and Transplantation  [phone] clinic      Again, thank you for allowing me to participate in the care of your patient.        Sincerely,        Karson Dailey MD

## 2020-09-30 ENCOUNTER — TRANSFERRED RECORDS (OUTPATIENT)
Dept: HEALTH INFORMATION MANAGEMENT | Facility: CLINIC | Age: 70
End: 2020-09-30

## 2020-11-14 ENCOUNTER — HEALTH MAINTENANCE LETTER (OUTPATIENT)
Age: 70
End: 2020-11-14

## 2020-11-25 ENCOUNTER — TRANSFERRED RECORDS (OUTPATIENT)
Dept: HEALTH INFORMATION MANAGEMENT | Facility: CLINIC | Age: 70
End: 2020-11-25

## 2020-11-25 ENCOUNTER — MEDICAL CORRESPONDENCE (OUTPATIENT)
Dept: HEALTH INFORMATION MANAGEMENT | Facility: CLINIC | Age: 70
End: 2020-11-25

## 2020-11-30 ENCOUNTER — TRANSFERRED RECORDS (OUTPATIENT)
Dept: HEALTH INFORMATION MANAGEMENT | Facility: CLINIC | Age: 70
End: 2020-11-30

## 2021-02-01 ENCOUNTER — TRANSCRIBE ORDERS (OUTPATIENT)
Dept: OTHER | Age: 71
End: 2021-02-01

## 2021-02-01 DIAGNOSIS — C85.90 LYMPHOMA, UNSPECIFIED BODY REGION, UNSPECIFIED LYMPHOMA TYPE (H): Primary | ICD-10-CM

## 2021-02-01 DIAGNOSIS — D49.6 BRAIN TUMOR (H): ICD-10-CM

## 2021-02-09 RX ORDER — BENZONATATE 100 MG/1
100 CAPSULE ORAL 3 TIMES DAILY PRN
COMMUNITY
End: 2021-02-10

## 2021-02-09 RX ORDER — PANTOPRAZOLE SODIUM 40 MG/1
40 TABLET, DELAYED RELEASE ORAL 2 TIMES DAILY
COMMUNITY
End: 2021-02-10 | Stop reason: ALTCHOICE

## 2021-02-09 RX ORDER — OLANZAPINE 5 MG/1
2.5-5 TABLET ORAL
COMMUNITY
End: 2021-02-10

## 2021-02-09 RX ORDER — CODEINE PHOSPHATE AND GUAIFENESIN 10; 100 MG/5ML; MG/5ML
5 SOLUTION ORAL EVERY 4 HOURS PRN
COMMUNITY
End: 2021-02-10

## 2021-02-09 RX ORDER — E-LYTES/CARBOXYMETHYLCELLULOSE
2-4 AEROSOL, SPRAY WITH PUMP (ML) MUCOUS MEMBRANE PRN
COMMUNITY
End: 2021-02-10 | Stop reason: ALTCHOICE

## 2021-02-09 RX ORDER — ACETAMINOPHEN 650 MG/1
650 SUPPOSITORY RECTAL EVERY 6 HOURS PRN
COMMUNITY

## 2021-02-09 ASSESSMENT — MIFFLIN-ST. JEOR: SCORE: 857.01

## 2021-02-09 NOTE — PROGRESS NOTES
[place]  PRIMARY CARE PROVIDER AND CLINIC:  SAM ROBLERO MD, [facility] [address] [facility]   Chief Complaint   Patient presents with     Establish Care     [facility] Medical Record Number:  [id number]  Place of Service where encounter took place:  [facility] [[id number]]    Simran Negro  is a 71 year old  (1950), admitted to the above facility from  [facility]. Hospital stay 1/19/2021 through 2/9/21..  Admitted to this facility for  rehab, medical management and nursing care.    HPI:    HPI information obtained from: facility chart records, facility staff, patient report, [facility] Epic chart review and Care Everywhere Epic chart review.     Brief Summary of Hospital Course: 70 year old female intentionally ingested sulfuric acid (Santeen ) on 1/19/21, no evidence of esophageal perforation or mediastinitis. She had an EGD with GI with diffuse mucosal changes in the entire esophagus. They advised AGAINST any NG tube placement given risk for perforation, as such, patient was started on TPN. They clear patient to ADAT with Speech and Language therapy. She made slow progress with this and is continued to be a risk for aspiration. She was transferred to TCU with ongoing therapy needs. She will follow up with GI in 1-2 weeks to assess her progress and discuss further plans for nutrition (Continue TPN vs Peg placement)    Updates on Status Since Skilled nursing Admission: Patient only speaks hmong language. Tried to attempt telephone interpretor on site, however due to patient's soft voice communication was limited. Spoke to  who will assist with obtaining in house interpretor on site for ongoing needs and concerns. Patient appears pleasant and calm found lying in bed. Slightly anxious as provider noted she was touching her PICC line in her arm. History of attempts of self removal in the past. No observation of  pain noted. Therapy recommend angelina lift transfers due to progressive weakness. Nursing denies any acute concerns, except for her BP to be chronically low at times of <100. Recent fall during post assessment visit with provider. She was trying to self transfer out of wheelchair and was found on floor. No injury noted. Spoke to mari Peoples at length regarding goals of care and current status. He continues to re-enforce that this was an accidental ingestion of sulfuric acid. He continues to deny any history of psychiatric concerns or previous attempts.     BP Readings from Last 3 Encounters:   02/09/21 123/62   09/23/20 105/55   09/23/20 105/55     Wt Readings from Last 5 Encounters:   02/09/21 45.8 kg (101 lb)   09/23/20 48.5 kg (107 lb)   09/23/20 48.7 kg (107 lb 4.8 oz)   06/08/20 55.6 kg (122 lb 8 oz)   03/04/20 50.6 kg (111 lb 9.6 oz)     CODE STATUS/ADVANCE DIRECTIVES DISCUSSION:   DNR / DNI  Patient's living condition: lives with family, mari Peoples   ALLERGIES: Hydromorphone  PAST MEDICAL HISTORY:  has a past medical history of Anxiety, Cancer (H), Depressive disorder, Dry eye syndrome, Gallstones, Lupus (H), Seizures (H), and Sjogrens syndrome (H).  PAST SURGICAL HISTORY:   has a past surgical history that includes Power Port Placement; Cholecystectomy; and Optical tracking system biopsy brain (3/19/2014).  FAMILY HISTORY: family history is not on file.  SOCIAL HISTORY:   reports that she has never smoked. She has never used smokeless tobacco. She reports that she does not drink alcohol or use drugs.    Post Discharge Medication Reconciliation Status: discharge medications reconciled and changed, per note/orders    Current Outpatient Medications   Medication Sig Dispense Refill     acetaminophen (TYLENOL) 650 MG suppository Place 650 mg rectally every 6 hours as needed       Amino Ac Elect-Calc in D15W (CLINIMIX E 5/15) 5 % SOLN infusion Per Pharmacy dosing:  cyclic x 18 hours. 20 ml x 1 hour, increase to 60 ml x  "16 hours, decrease to 20 ml x 1 hour.       insulin aspart (NOVOLOG PEN) 100 UNIT/ML pen Inject Subcutaneous every 6 hours Inject per sliding scale: 120-149 = 2 units; 150-199 = 3 units; 200-249 =  6 units; 250-299 = 9 units; 300-349 = 12 units; 350 or greater = 15 units and call MD if BGL is equal to or greater than 350 after 2 hour recheck       insulin isophane human (HUMULIN N PEN) 100 UNIT/ML injection Inject 7 Units Subcutaneous daily HOLD if Blood Glucose<80 3 mL 11     lipids (INTRALIPID) 20 % infusion Inject 250 mLs into the vein daily       Mouthwashes (BIOTENE DRY MOUTH GENTLE) LIQD Take 5 mLs by mouth 3 times daily AND every 1hours  mL 11     OLANZapine (ZYPREXA) 5 MG tablet Take 0.5-1 tablets (2.5-5 mg) by mouth nightly as needed (insomnia, depression) 30 tablet 4     pantoprazole (PROTONIX) 40 MG injection Inject 40 mg into the vein 2 times daily 60 each 11     ROS:  Unobtainable secondary to cognitive impairment.  and language of ong. Patient is very soft spoken due to damage in throat that telephone interpretor services is very hard and difficult, therefore  will need to allow in person on site interpretor needs for continued needs and cares.     Vitals:  /62   Pulse 75   Temp 97.6  F (36.4  C)   Resp 16   Ht 1.448 m (4' 9\")   Wt 45.8 kg (101 lb)   SpO2 100%   BMI 21.86 kg/m    Exam:  GENERAL APPEARANCE:  Alert, in no distress, cooperative  ENT:  Mouth and posterior oropharynx normal, moist mucous membranes, [place]  EYES:  EOM, conjunctivae, lids, pupils and irises normal  NECK:  No adenopathy,masses or thyromegaly  RESP:  respiratory effort and palpation of chest normal, lungs clear to auscultation , no respiratory distress  CV:  Palpation and auscultation of heart done , regular rate and rhythm, no murmur, rub, or gallop, no edema, +2 pedal pulses  ABDOMEN:  normal bowel sounds, soft, nontender, no hepatosplenomegaly or other masses, no guarding or rebound  M/S:   Erendira lift " with wheelchair bound needs  SKIN:  Inspection of skin and subcutaneous tissue baseline, Palpation of skin and subcutaneous tissue baseline  NEURO:   Cranial nerves 2-12 are normal tested and grossly at patient's baseline, no purposeful movement in upper and lower extremities  PSYCH:  Difficult to assess due to language barrier. Does appear anxious slightly as she tends to want to grab at PICC line to arm. Hx of self removal in hospital    Lab/Diagnostic data:  Labs done in SNF are in [place] EPIC. Please refer to them using [employer]/Care Everywhere.    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 S/T below diagnosis  Plan:   -Continue Physical therapy, Occupational therapy and Speech and Language therapy as directed  -SW to remain involve for safe discharge planning needs  -Recommend LTC needs at this time due to current status    (J96.00) Acute respiratory failure, unspecified whether with hypoxia or hypercapnia (H)  (J69.0) Aspiration pneumonitis (H)  (T14.91XA) Attempted suicide (H)  (T54.91XD) Accidental poisoning by corrosives and caustics, subsequent encounter  (F43.10) PTSD  Comment: Acute on chronic. NOT AT GOAL Developed a fever with some shortness of breath. She completed course of antibiotics of Zosyn with good relief for pneumonia concerns. Suicidal ideation where patient ingested sulfuric acid (santeen ) on 1/19/21. Mental health documents when speaking to son Richelle, he indicates that patient does not have any formal psychiatric history. He does note a history of what he identifies as depression in the past when going through chemo but never formally diagnosed. He does report she has been increasingly more forgetful but she has never been tested for a neurocognitive disorder. When asked if he thinks this was a suicide attempt, he strongly disagrees. He believes  "patient was confused and mistook chemical for something ingestible like soda or water because it was left out on the kitchen counter after son used it to unclog the sink. CT chest on 1/20 shows no evidence of mediastinitis. Laryngoscopy in ED shoed mucosal edema. EGD 1/20- diffuse mucosal changes throughout the entire esophagus. GI had signed off, they recommend outpatient follow up.   Plan:   -Continue Occupational therapy, Occupational therapy and Speech and Language therapy as directed  -Will change PPI to IV form BID due to NPO status at this time.   -Monitor respiratory status  -Will order in house psych eval and treatment for ongoing needs  -Continue PRN zyprexa at HS as directed for now.   -Weekly labs on mondays for: CMP, CBC with diff, magnesium, triglycerides, and phosphorus.     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 NOT AT GOAL. She was needing to have 1:1 sitters in the hospital which was removed by hospital psych. Seen by psych in hspital but due to NPO status patient is unable to start remeron therefore options very limited. Family declined inpatient psychiatric admission and does not feel this was a true suicidal attempt. Per behavior health assessment on 2/2/21: Patient does not remember the events that lead her to be hospitalized. When asked specifically if she drank something to kill herself, she replied \" I would never do that.\" Patient endorses being sad sometimes and worries about things at home. She stated she worries at home because she isn't able to do much as she used to be able to. She reports that she has help all the time between her 2 sons and a PCA. She denies any " "safety issues at home. When asked about what helps her manage her worries at home, she states her sons keep her very busy.\" Behavioral health assessed patient as LOW RISK. No current suicidal ideation or behaviors that suggest otherwise. Mental health documents when speaking to son Richelle, he indicates that patient does not have any formal psychiatric history. He does note a history of what he identifies as depression in the past when going through chemo but never formally diagnosed. He does report she has been increasingly more forgetful but she has never been tested for a neurocognitive disorder. When asked if he thinks this was a suicide attempt, he strongly disagrees. He believes patient was confused and mistook chemical for something ingestible like soda or water because it was left out on the kitchen counter after son used it to unclog the sink.   Plan:   -Will order in house psych eval and treatment  -Will order in house psych to assess neuro-cognition status for ongoing needs  -Will continue zyprexa ODT PRN as directed at night to help with moods and sleep.   -Monitor mood and behaviors  -Tylenol PRN rectal only.   -Monitor for changes in sleeping patterns  -Weekly labs on mondays for: CMP, CBC with diff, magnesium, triglycerides, and phosphorus.     (R13.10) Swallowing impairment  (J04.0) Laryngitis  (K21.9) Gastroesophageal reflux disease without esophagitis  Comment: Acute on chronic S/T ingestion of caustic substance above. Laryngoscopy in ED showed mucosal edema. GI Advised AGAINST NG or peg tube at this time due to esophagus damage due to acid ingestion and high risk of perforation. GI will see patient in clinic in 1-2 weeks for follow up/dicuss further options with possible peg placement if no progress with PO intake/SLP). GI recommends clear liquids but patient is choking on liquids at this time. Speech recommends continue NPO status. Patient requires significant suctioning after exhibiting wet cough " prior to and immediately following initiation of pharyngeal swallow on all PO attempts of NTL via 1/2 tsp bolus by spoon.   Plan:   -Continue Physical therapy and Occupational therapy and Speech and Language therapy as directed  -ADAT if able  -Discontinue tessalon and guaifenesin for now due to swallowing impairment needs  -TPN and lipids to be dosed per [facility] as directed.   -Lipid infusion rate: 20.8ml/hr for 12 hours daily.   -TPN Clinimix E 5% Amino Acid, 15% Dextrose ml/hr (1000 ml daily), cyclic x 18 hours. 20 ml x 1 hour, increase to 60 ml x 16 hours, decrease to 20 ml x 1 hour.   -PICC non-valve catheter cares along with flush with NS 10mL NS before and after IV medications administration followed by heparin 10units/ml- give 5mL. -Maintenenace flush each lumen every 12 hours as well.   -Provide oral care with swab each shift for oral mucosa needs  -Speech and Language therapy allowing/recommending ice chips with licensed staff TID and PRN  -Will change artifical saliva to swish and spit 5mL TID and every hour PRN for mucosal needs.   -Follow up with [facility] in 1-2 weeks as directed. Pending appt  -Keep HOB elevated 30 degrees at all times due to aspiration risk.   -Weekly labs on mondays for: CMP, CBC with diff, magnesium, triglycerides, and phosphorus.     (E11.9) Type 2 diabetes mellitus without complication, unspecified whether long term insulin use (H)  Comment: Acute on chronic. S/T TPN needs. Appears patient has been on s/s insulin as directed along with NPH BID, however evening dose of NPH has been held for the last several days.   Plan:   -Monitor Blood Glucose QID as directed  -Continue ISS as directed  -Will change BPH to 7units daily for now. HOLD if Blood Glucose<80.   -Monitor for worsening s/sx of concerns  -Weekly labs on mondays for: CMP, CBC with diff, magnesium, triglycerides, and phosphorus.     (I95.89) Hypotension due to hypovolemia  Comment: Acute on chronic S/T above  diagnosis. Previously was on IVF continuous up until admission. No orders to start upon admission to SNF.   Plan:  -Will restart NS 0.9% at 125ml/hr continuous order  -Monitor BP and HR as directed  -Monitor for worsening s/sx of concerns  -Weekly labs on mondays for: CMP, CBC with diff, magnesium, triglycerides, and phosphorus.     (C85.89) CNS Lymphoma  Comment: Chronic. The patient is being followed by oncology at [facility].  is aware and will contact [facility] for further plans. Followed by DR Raza. Recommends brain MRI with contrast and will need to be scheduled outpatient.   Plan:  -Follow up with Dr Raza as directed. Will need Brain MRI with contrast prior to appt needs  -Monitor for worsening s/sx of concerns  -Weekly labs on mondays for: CMP, CBC with diff, magnesium, triglycerides, and phosphorus.     Orders written by provider at facility    Total time spent with patient visit at the skilled nursing facility was 161 min including patient visit and review of past records. Greater than 50% of total time spent with counseling and coordinating care with nursing staff due to the complexities of their diagnoses, review of HPI, development of POC, assisting HUC on appointment schedules, and patient education and review of POC with patient. No hospital records available via care everywhere which caused difficulty with reviewing of past records in current chart. Provider needed to call [facility] staff along with SW at [facility] and had a 20 minute conversation with staff for hand off report, along with obtaining copies of current records which includes hospital discharge summary. Total time spent also includes additional 30 minute phone discussion with patient's family on: current medications/orders changes, recent blood work results, continued discharge plan/needs, subsequent treatment plan while in TCU and thereafter, current pain control plan along with importance of compliance with current  recommendations above and specialty care needs.      Madhuri Cantu DNP, APRN

## 2021-02-10 ENCOUNTER — DOCUMENTATION ONLY (OUTPATIENT)
Dept: CARE COORDINATION | Facility: CLINIC | Age: 71
End: 2021-02-10

## 2021-02-10 ENCOUNTER — NURSING HOME VISIT (OUTPATIENT)
Dept: GERIATRICS | Facility: CLINIC | Age: 71
End: 2021-02-10
Payer: COMMERCIAL

## 2021-02-10 VITALS
RESPIRATION RATE: 16 BRPM | BODY MASS INDEX: 21.79 KG/M2 | TEMPERATURE: 97.6 F | SYSTOLIC BLOOD PRESSURE: 123 MMHG | OXYGEN SATURATION: 100 % | WEIGHT: 101 LBS | DIASTOLIC BLOOD PRESSURE: 62 MMHG | HEART RATE: 75 BPM | HEIGHT: 57 IN

## 2021-02-10 DIAGNOSIS — F43.10 POST TRAUMATIC STRESS DISORDER: ICD-10-CM

## 2021-02-10 DIAGNOSIS — M62.81 GENERALIZED MUSCLE WEAKNESS: ICD-10-CM

## 2021-02-10 DIAGNOSIS — F43.10 PTSD (POST-TRAUMATIC STRESS DISORDER): ICD-10-CM

## 2021-02-10 DIAGNOSIS — F39 UNSPECIFIED MOOD (AFFECTIVE) DISORDER (H): ICD-10-CM

## 2021-02-10 DIAGNOSIS — J96.00 ACUTE RESPIRATORY FAILURE, UNSPECIFIED WHETHER WITH HYPOXIA OR HYPERCAPNIA (H): ICD-10-CM

## 2021-02-10 DIAGNOSIS — C83.390 CNS LYMPHOMA: Primary | ICD-10-CM

## 2021-02-10 DIAGNOSIS — J04.0 LARYNGITIS: ICD-10-CM

## 2021-02-10 DIAGNOSIS — R53.81 PHYSICAL DECONDITIONING: ICD-10-CM

## 2021-02-10 DIAGNOSIS — E86.1 HYPOTENSION DUE TO HYPOVOLEMIA: ICD-10-CM

## 2021-02-10 DIAGNOSIS — K21.9 GASTROESOPHAGEAL REFLUX DISEASE WITHOUT ESOPHAGITIS: ICD-10-CM

## 2021-02-10 DIAGNOSIS — R13.10 SWALLOWING IMPAIRMENT: ICD-10-CM

## 2021-02-10 DIAGNOSIS — T54.91XD: ICD-10-CM

## 2021-02-10 DIAGNOSIS — R52 PAIN: ICD-10-CM

## 2021-02-10 DIAGNOSIS — T14.91XA ATTEMPTED SUICIDE (H): ICD-10-CM

## 2021-02-10 DIAGNOSIS — G04.00: ICD-10-CM

## 2021-02-10 DIAGNOSIS — F32.9 MAJOR DEPRESSIVE DISORDER WITH CURRENT ACTIVE EPISODE, UNSPECIFIED DEPRESSION EPISODE SEVERITY, UNSPECIFIED WHETHER RECURRENT: ICD-10-CM

## 2021-02-10 DIAGNOSIS — E11.9 TYPE 2 DIABETES MELLITUS WITHOUT COMPLICATION, UNSPECIFIED WHETHER LONG TERM INSULIN USE (H): ICD-10-CM

## 2021-02-10 DIAGNOSIS — J69.0 ASPIRATION PNEUMONITIS (H): ICD-10-CM

## 2021-02-10 PROBLEM — R56.9 CONVULSIONS, UNSPECIFIED CONVULSION TYPE (H): Status: ACTIVE | Noted: 2021-02-10

## 2021-02-10 PROBLEM — G47.00 INSOMNIA: Status: ACTIVE | Noted: 2021-02-10

## 2021-02-10 PROBLEM — I73.9 PVD (PERIPHERAL VASCULAR DISEASE) (H): Status: ACTIVE | Noted: 2021-02-10

## 2021-02-10 PROCEDURE — 99356 PR PROLONGED SERV,INPATIENT,1ST HR: CPT | Performed by: NURSE PRACTITIONER

## 2021-02-10 PROCEDURE — 99310 SBSQ NF CARE HIGH MDM 45: CPT | Performed by: NURSE PRACTITIONER

## 2021-02-10 RX ORDER — OLANZAPINE 5 MG/1
2.5-5 TABLET ORAL
Qty: 30 TABLET | Refills: 4 | Status: SHIPPED | OUTPATIENT
Start: 2021-02-10 | End: 2021-02-15

## 2021-02-10 RX ORDER — LEUCINE, PHENYLALANINE, LYSINE, METHIONINE, ISOLEUCINE, VALINE, HISTIDINE, THREONINE, TRYPTOPHAN, ALANINE, GLYCINE, ARGININE, PROLINE, SERINE, TYROSINE, SODIUM ACETATE, DIBASIC POTASSIUM PHOSPHATE, MAGNESIUM CHLORIDE, SODIUM CHLORIDE, CALCIUM CHLORIDE, DEXTROSE 365; 280; 290; 200; 300; 290; 240; 210; 90; 1035; 515; 575; 340; 250; 20; 340; 261; 51; 59; 33; 15 MG/100ML; MG/100ML; MG/100ML; MG/100ML; MG/100ML; MG/100ML; MG/100ML; MG/100ML; MG/100ML; MG/100ML; MG/100ML; MG/100ML; MG/100ML; MG/100ML; MG/100ML; MG/100ML; MG/100ML; MG/100ML; MG/100ML; MG/100ML; G/100ML
INJECTION INTRAVENOUS
Start: 2021-02-10

## 2021-02-10 RX ORDER — SODIUM CHLORIDE 9 MG/ML
125 INJECTION, SOLUTION INTRAVENOUS CONTINUOUS
Qty: 3000 ML | Refills: 11 | Status: SHIPPED | OUTPATIENT
Start: 2021-02-10 | End: 2021-02-16

## 2021-02-10 RX ORDER — PANTOPRAZOLE SODIUM 40 MG/10ML
40 INJECTION, POWDER, LYOPHILIZED, FOR SOLUTION INTRAVENOUS 2 TIMES DAILY
Qty: 60 EACH | Refills: 11 | Status: SHIPPED | OUTPATIENT
Start: 2021-02-10

## 2021-02-10 NOTE — LETTER
2/10/2021        RE: Simran Negro  [address]   [facility] [address]        [facility]  PRIMARY CARE PROVIDER AND CLINIC:  SAM ROBLERO MD, [facility] [address] / [facility]   Chief Complaint   Patient presents with     Establish Care     [place] Medical Record Number:  [id number]  Place of Service where encounter took place:  [facility] [[id number]]    Simran Negro  is a 71 year old  (1950), admitted to the above facility from  [facility]. Hospital stay 1/19/2021 through 2/9/21..  Admitted to this facility for  rehab, medical management and nursing care.    HPI:    HPI information obtained from: facility chart records, facility staff, patient report, [facility] chart review and Care Everywhere [facility] chart review.     Brief Summary of Hospital Course: 70 year old female intentionally ingested sulfuric acid (Santeen ) on 1/19/21, no evidence of esophageal perforation or mediastinitis. She had an EGD with GI with diffuse mucosal changes in the entire esophagus. They advised AGAINST any NG tube placement given risk for perforation, as such, patient was started on TPN. They clear patient to ADAT with Speech and Language therapy. She made slow progress with this and is continued to be a risk for aspiration. She was transferred to TCU with ongoing therapy needs. She will follow up with GI in 1-2 weeks to assess her progress and discuss further plans for nutrition (Continue TPN vs Peg placement)    Updates on Status Since Skilled nursing Admission: Patient only speaks hmong language. Tried to attempt telephone interpretor on site, however due to patient's soft voice communication was limited. Spoke to  who will assist with obtaining in house interpretor on site for ongoing needs and concerns. Patient appears pleasant and calm found lying in bed. Slightly anxious as provider noted she was touching her PICC  line in her arm. History of attempts of self removal in the past. No observation of pain noted. Therapy recommend angelina lift transfers due to progressive weakness. Nursing denies any acute concerns, except for her BP to be chronically low at times of <100. Recent fall during post assessment visit with provider. She was trying to self transfer out of wheelchair and was found on floor. No injury noted. Spoke to son Richelle at length regarding goals of care and current status. He continues to re-enforce that this was an accidental ingestion of sulfuric acid. He continues to deny any history of psychiatric concerns or previous attempts.     BP Readings from Last 3 Encounters:   02/09/21 123/62   09/23/20 105/55   09/23/20 105/55     Wt Readings from Last 5 Encounters:   02/09/21 45.8 kg (101 lb)   09/23/20 48.5 kg (107 lb)   09/23/20 48.7 kg (107 lb 4.8 oz)   06/08/20 55.6 kg (122 lb 8 oz)   03/04/20 50.6 kg (111 lb 9.6 oz)     CODE STATUS/ADVANCE DIRECTIVES DISCUSSION:   DNR / DNI  Patient's living condition: lives with family, son Richelle   ALLERGIES: Hydromorphone  PAST MEDICAL HISTORY:  has a past medical history of Anxiety, Cancer (H), Depressive disorder, Dry eye syndrome, Gallstones, Lupus (H), Seizures (H), and Sjogrens syndrome (H).  PAST SURGICAL HISTORY:   has a past surgical history that includes Power Port Placement; Cholecystectomy; and Optical tracking system biopsy brain (3/19/2014).  FAMILY HISTORY: family history is not on file.  SOCIAL HISTORY:   reports that she has never smoked. She has never used smokeless tobacco. She reports that she does not drink alcohol or use drugs.    Post Discharge Medication Reconciliation Status: discharge medications reconciled and changed, per note/orders    Current Outpatient Medications   Medication Sig Dispense Refill     acetaminophen (TYLENOL) 650 MG suppository Place 650 mg rectally every 6 hours as needed       Amino Ac Elect-Calc in D15W (CLINIMIX E 5/15) 5 % SOLN  "infusion Per Pharmacy dosing:  cyclic x 18 hours. 20 ml x 1 hour, increase to 60 ml x 16 hours, decrease to 20 ml x 1 hour.       insulin aspart (NOVOLOG PEN) 100 UNIT/ML pen Inject Subcutaneous every 6 hours Inject per sliding scale: 120-149 = 2 units; 150-199 = 3 units; 200-249 =  6 units; 250-299 = 9 units; 300-349 = 12 units; 350 or greater = 15 units and call MD if BGL is equal to or greater than 350 after 2 hour recheck       insulin isophane human (HUMULIN N PEN) 100 UNIT/ML injection Inject 7 Units Subcutaneous daily HOLD if Blood Glucose<80 3 mL 11     lipids (INTRALIPID) 20 % infusion Inject 250 mLs into the vein daily       Mouthwashes (BIOTENE DRY MOUTH GENTLE) LIQD Take 5 mLs by mouth 3 times daily AND every 1hours  mL 11     OLANZapine (ZYPREXA) 5 MG tablet Take 0.5-1 tablets (2.5-5 mg) by mouth nightly as needed (insomnia, depression) 30 tablet 4     pantoprazole (PROTONIX) 40 MG injection Inject 40 mg into the vein 2 times daily 60 each 11     ROS:  Unobtainable secondary to cognitive impairment.  and language of Hmong. Patient is very soft spoken due to damage in throat that telephone interpretor services is very hard and difficult, therefore  will need to allow in person on site interpretor needs for continued needs and cares.     Vitals:  /62   Pulse 75   Temp 97.6  F (36.4  C)   Resp 16   Ht 1.448 m (4' 9\")   Wt 45.8 kg (101 lb)   SpO2 100%   BMI 21.86 kg/m    Exam:  GENERAL APPEARANCE:  Alert, in no distress, cooperative  ENT:  Mouth and posterior oropharynx normal, moist mucous membranes, [place]  EYES:  EOM, conjunctivae, lids, pupils and irises normal  NECK:  No adenopathy,masses or thyromegaly  RESP:  respiratory effort and palpation of chest normal, lungs clear to auscultation , no respiratory distress  CV:  Palpation and auscultation of heart done , regular rate and rhythm, no murmur, rub, or gallop, no edema, +2 pedal pulses  ABDOMEN:  normal bowel sounds, soft, " nontender, no hepatosplenomegaly or other masses, no guarding or rebound  M/S:   Erendira lift with wheelchair bound needs  SKIN:  Inspection of skin and subcutaneous tissue baseline, Palpation of skin and subcutaneous tissue baseline  NEURO:   Cranial nerves 2-12 are normal tested and grossly at patient's baseline, no purposeful movement in upper and lower extremities  PSYCH:  Difficult to assess due to language barrier. Does appear anxious slightly as she tends to want to grab at PICC line to arm. Hx of self removal in hospital    Lab/Diagnostic data:  Labs done in SNF are in [place] EPIC. Please refer to them using [employer]/Care Everywhere.    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 S/T below diagnosis  Plan:   -Continue Physical therapy, Occupational therapy and Speech and Language therapy as directed  -SW to remain involve for safe discharge planning needs  -Recommend LTC needs at this time due to current status    (J96.00) Acute respiratory failure, unspecified whether with hypoxia or hypercapnia (H)  (J69.0) Aspiration pneumonitis (H)  (T14.91XA) Attempted suicide (H)  (T54.91XD) Accidental poisoning by corrosives and caustics, subsequent encounter  (F43.10) PTSD  Comment: Acute on chronic. NOT AT GOAL Developed a fever with some shortness of breath. She completed course of antibiotics of Zosyn with good relief for pneumonia concerns. Suicidal ideation where patient ingested sulfuric acid (santeen ) on 1/19/21. Mental health documents when speaking to son Richelle, he indicates that patient does not have any formal psychiatric history. He does note a history of what he identifies as depression in the past when going through chemo but never formally diagnosed. He does report she has been increasingly more forgetful but she has never been tested for a neurocognitive disorder.  "When asked if he thinks this was a suicide attempt, he strongly disagrees. He believes patient was confused and mistook chemical for something ingestible like soda or water because it was left out on the kitchen counter after son used it to unclog the sink. CT chest on 1/20 shows no evidence of mediastinitis. Laryngoscopy in ED shoed mucosal edema. EGD 1/20- diffuse mucosal changes throughout the entire esophagus. GI had signed off, they recommend outpatient follow up.   Plan:   -Continue Occupational therapy, Occupational therapy and Speech and Language therapy as directed  -Will change PPI to IV form BID due to NPO status at this time.   -Monitor respiratory status  -Will order in house psych eval and treatment for ongoing needs  -Continue PRN zyprexa at HS as directed for now.   -Weekly labs on mondays for: CMP, CBC with diff, magnesium, triglycerides, and phosphorus.     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 NOT AT GOAL. She was needing to have 1:1 sitters in the hospital which was removed by hospital psych. [facility] but due to NPO status patient is unable to start remeron therefore options very limited. Family declined inpatient psychiatric admission and does not feel this was a true suicidal attempt. Per behavior health assessment on 2/2/21: Patient does not remember the events that lead her to be hospitalized. When asked specifically if she drank something to kill herself, she replied \" I would never do that.\" Patient endorses being sad sometimes and worries about things at home. She stated she worries at home because she isn't able to do much as she used to be able to. She " "reports that she has help all the time between her 2 sons and a PCA. She denies any safety issues at home. When asked about what helps her manage her worries at home, she states her sons keep her very busy.\" Behavioral health assessed patient as LOW RISK. No current suicidal ideation or behaviors that suggest otherwise. Mental health documents when speaking to son Richelle, he indicates that patient does not have any formal psychiatric history. He does note a history of what he identifies as depression in the past when going through chemo but never formally diagnosed. He does report she has been increasingly more forgetful but she has never been tested for a neurocognitive disorder. When asked if he thinks this was a suicide attempt, he strongly disagrees. He believes patient was confused and mistook chemical for something ingestible like soda or water because it was left out on the kitchen counter after son used it to unclog the sink.   Plan:   -Will order in house psych eval and treatment  -Will order in house psych to assess neuro-cognition status for ongoing needs  -Will continue zyprexa ODT PRN as directed at night to help with moods and sleep.   -Monitor mood and behaviors  -Tylenol PRN rectal only.   -Monitor for changes in sleeping patterns  -Weekly labs on mondays for: CMP, CBC with diff, magnesium, triglycerides, and phosphorus.     (R13.10) Swallowing impairment  (J04.0) Laryngitis  (K21.9) Gastroesophageal reflux disease without esophagitis  Comment: Acute on chronic S/T ingestion of caustic substance above. Laryngoscopy in ED showed mucosal edema. GI Advised AGAINST NG or peg tube at this time due to esophagus damage due to acid ingestion and high risk of perforation. GI will see patient in clinic in 1-2 weeks for follow up/dicuss further options with possible peg placement if no progress with PO intake/SLP). GI recommends clear liquids but patient is choking on liquids at this time. Speech recommends " continue NPO status. Patient requires significant suctioning after exhibiting wet cough prior to and immediately following initiation of pharyngeal swallow on all PO attempts of NTL via 1/2 tsp bolus by spoon.   Plan:   -Continue Physical therapy and Occupational therapy and Speech and Language therapy as directed  -ADAT if able  -Discontinue tessalon and guaifenesin for now due to swallowing impairment needs  -TPN and lipids to be dosed per [email or web address] pharmacy as directed.   -Lipid infusion rate: 20.8ml/hr for 12 hours daily.   -TPN Clinimix E 5% Amino Acid, 15% Dextrose ml/hr (1000 ml daily), cyclic x 18 hours. 20 ml x 1 hour, increase to 60 ml x 16 hours, decrease to 20 ml x 1 hour.   -PICC non-valve catheter cares along with flush with NS 10mL NS before and after IV medications administration followed by heparin 10units/ml- give 5mL. -Maintenenace flush each lumen every 12 hours as well.   -Provide oral care with swab each shift for oral mucosa needs  -Speech and Language therapy allowing/recommending ice chips with licensed staff TID and PRN  -Will change artifical saliva to swish and spit 5mL TID and every hour PRN for mucosal needs.   -Follow up with [facility] in 1-2 weeks as directed. Pending appt  -Keep HOB elevated 30 degrees at all times due to aspiration risk.   -Weekly labs on mondays for: CMP, CBC with diff, magnesium, triglycerides, and phosphorus.     (E11.9) Type 2 diabetes mellitus without complication, unspecified whether long term insulin use (H)  Comment: Acute on chronic. S/T TPN needs. Appears patient has been on s/s insulin as directed along with NPH BID, however evening dose of NPH has been held for the last several days.   Plan:   -Monitor Blood Glucose QID as directed  -Continue ISS as directed  -Will change BPH to 7units daily for now. HOLD if Blood Glucose<80.   -Monitor for worsening s/sx of concerns  -Weekly labs on mondays for: CMP, CBC with diff, magnesium, triglycerides, and phosphorus.      (C85.89) CNS Lymphoma  Comment: Chronic. The patient is being followed by oncology at [facility].  is aware and will contact [facility] for further plans. Followed by DR Raza. Recommends brain MRI with contrast and will need to be scheduled outpatient.   Plan:  -Follow up with Dr Raza as directed. Will need Brain MRI with contrast prior to appt needs  -Monitor for worsening s/sx of concerns  -Weekly labs on mondays for: CMP, CBC with diff, magnesium, triglycerides, and phosphorus.     Orders written by provider at facility    Total time spent with patient visit at the [facility] nursing [facility] was 161 min including patient visit and review of past records. Greater than 50% of total time spent with counseling and coordinating care with nursing staff due to the complexities of their diagnoses, review of HPI, development of POC, assisting HUC on appointment schedules, and patient education and review of POC with patient. No hospital records available via care everywhere which caused difficulty with reviewing of past records in current chart. Provider needed to call [facility] [employer] staff along with SW at [facility] and had a 20 minute conversation with staff for hand off report, along with obtaining copies of current records which includes hospital discharge summary. Total time spent also includes additional 30 minute phone discussion with patient's family on: current medications/orders changes, recent blood work results, continued discharge plan/needs, subsequent treatment plan while in TCU and thereafter, current pain control plan along with importance of compliance with current recommendations above and specialty care needs.      Madhuri Cantu DNP, APRN                    Sincerely,        Madhuri Cantu, APRN CNP

## 2021-02-15 ENCOUNTER — RECORDS - HEALTHEAST (OUTPATIENT)
Dept: LAB | Facility: CLINIC | Age: 71
End: 2021-02-15

## 2021-02-15 ENCOUNTER — NURSING HOME VISIT (OUTPATIENT)
Dept: GERIATRICS | Facility: CLINIC | Age: 71
End: 2021-02-15
Payer: COMMERCIAL

## 2021-02-15 ENCOUNTER — TRANSFERRED RECORDS (OUTPATIENT)
Dept: HEALTH INFORMATION MANAGEMENT | Facility: CLINIC | Age: 71
End: 2021-02-15

## 2021-02-15 VITALS
SYSTOLIC BLOOD PRESSURE: 110 MMHG | BODY MASS INDEX: 21.77 KG/M2 | DIASTOLIC BLOOD PRESSURE: 64 MMHG | OXYGEN SATURATION: 96 % | RESPIRATION RATE: 12 BRPM | WEIGHT: 110.9 LBS | TEMPERATURE: 98 F | HEIGHT: 60 IN | HEART RATE: 51 BPM

## 2021-02-15 DIAGNOSIS — R13.10 SWALLOWING IMPAIRMENT: ICD-10-CM

## 2021-02-15 DIAGNOSIS — J96.00 ACUTE RESPIRATORY FAILURE, UNSPECIFIED WHETHER WITH HYPOXIA OR HYPERCAPNIA (H): ICD-10-CM

## 2021-02-15 DIAGNOSIS — E11.9 TYPE 2 DIABETES MELLITUS WITHOUT COMPLICATION, UNSPECIFIED WHETHER LONG TERM INSULIN USE (H): ICD-10-CM

## 2021-02-15 DIAGNOSIS — C83.390 CNS LYMPHOMA: ICD-10-CM

## 2021-02-15 DIAGNOSIS — F43.10 POST TRAUMATIC STRESS DISORDER: ICD-10-CM

## 2021-02-15 DIAGNOSIS — G04.00: ICD-10-CM

## 2021-02-15 DIAGNOSIS — F43.10 PTSD (POST-TRAUMATIC STRESS DISORDER): ICD-10-CM

## 2021-02-15 DIAGNOSIS — R52 PAIN: ICD-10-CM

## 2021-02-15 DIAGNOSIS — K21.9 GASTROESOPHAGEAL REFLUX DISEASE WITHOUT ESOPHAGITIS: ICD-10-CM

## 2021-02-15 DIAGNOSIS — T54.91XD: ICD-10-CM

## 2021-02-15 DIAGNOSIS — R53.81 PHYSICAL DECONDITIONING: Primary | ICD-10-CM

## 2021-02-15 DIAGNOSIS — F39 UNSPECIFIED MOOD (AFFECTIVE) DISORDER (H): ICD-10-CM

## 2021-02-15 DIAGNOSIS — J04.0 LARYNGITIS: ICD-10-CM

## 2021-02-15 DIAGNOSIS — T14.91XA ATTEMPTED SUICIDE (H): ICD-10-CM

## 2021-02-15 DIAGNOSIS — M62.81 GENERALIZED MUSCLE WEAKNESS: ICD-10-CM

## 2021-02-15 DIAGNOSIS — E86.1 HYPOTENSION DUE TO HYPOVOLEMIA: ICD-10-CM

## 2021-02-15 DIAGNOSIS — J69.0 ASPIRATION PNEUMONITIS (H): ICD-10-CM

## 2021-02-15 DIAGNOSIS — F32.9 MAJOR DEPRESSIVE DISORDER WITH CURRENT ACTIVE EPISODE, UNSPECIFIED DEPRESSION EPISODE SEVERITY, UNSPECIFIED WHETHER RECURRENT: ICD-10-CM

## 2021-02-15 LAB
ALBUMIN SERPL-MCNC: 2.3 G/DL (ref 3.5–5)
ALBUMIN SERPL-MCNC: 2.3 G/DL (ref 3.5–5)
ALP SERPL-CCNC: 102 U/L (ref 45–120)
ALP SERPL-CCNC: 102 U/L (ref 45–120)
ALT SERPL W P-5'-P-CCNC: 15 U/L (ref 0–45)
ALT SERPL-CCNC: 15 U/L (ref 0–45)
ANION GAP SERPL CALCULATED.3IONS-SCNC: 11 MMOL/L (ref 5–18)
ANION GAP SERPL CALCULATED.3IONS-SCNC: 11 MMOL/L (ref 5–18)
AST SERPL W P-5'-P-CCNC: 21 U/L (ref 0–40)
AST SERPL-CCNC: 21 U/L (ref 0–40)
BASOPHILS # BLD AUTO: 0 THOU/UL (ref 0–0.2)
BASOPHILS NFR BLD AUTO: 0 % (ref 0–2)
BILIRUB SERPL-MCNC: 0.7 MG/DL (ref 0–1)
BILIRUB SERPL-MCNC: 0.7 MG/DL (ref 0–1)
BUN SERPL-MCNC: 10 MG/DL (ref 8–22)
BUN SERPL-MCNC: 10 MG/DL (ref 8–22)
CALCIUM SERPL-MCNC: 7.6 MG/DL (ref 8.5–10.5)
CALCIUM SERPL-MCNC: 7.6 MG/DL (ref 8.5–10.5)
CHLORIDE BLD-SCNC: 115 MMOL/L (ref 98–107)
CHLORIDE SERPLBLD-SCNC: 115 MMOL/L (ref 98–107)
CO2 SERPL-SCNC: 20 MMOL/L (ref 22–31)
CO2 SERPL-SCNC: 20 MMOL/L (ref 22–31)
CREAT SERPL-MCNC: 0.58 MG/DL (ref 0.6–1.1)
CREAT SERPL-MCNC: 0.58 MG/DL (ref 0.6–1.1)
DIFFERENTIAL: ABNORMAL
EOSINOPHIL # BLD AUTO: 0.1 THOU/UL (ref 0–0.4)
EOSINOPHIL NFR BLD AUTO: 2 % (ref 0–6)
ERYTHROCYTE [DISTWIDTH] IN BLOOD BY AUTOMATED COUNT: 12.9 % (ref 11–14.5)
ERYTHROCYTE [DISTWIDTH] IN BLOOD BY AUTOMATED COUNT: 3.28 % (ref 3.8–5.4)
FASTING STATUS PATIENT QL REPORTED: NORMAL
GFR SERPL CREATININE-BSD FRML MDRD: >60 ML/MIN/1.73M2
GFR SERPL CREATININE-BSD FRML MDRD: >60 ML/MIN/1.73M2
GLUCOSE BLD-MCNC: 50 MG/DL (ref 70–125)
GLUCOSE SERPL-MCNC: 50 MG/DL (ref 70–125)
HCT VFR BLD AUTO: 27.9 % (ref 35–47)
HCT VFR BLD AUTO: 27.9 % (ref 35–47)
HEMOGLOBIN: 8.3 G/DL (ref 12–16)
HGB BLD-MCNC: 8.3 G/DL (ref 12–16)
IMM GRANULOCYTES # BLD: 0 THOU/UL
IMM GRANULOCYTES NFR BLD: 0 %
LYMPHOCYTES # BLD AUTO: 1.5 THOU/UL (ref 0.8–4.4)
LYMPHOCYTES NFR BLD AUTO: 30 % (ref 20–40)
MAGNESIUM SERPL-MCNC: 1.7 MG/DL (ref 1.8–2.6)
MCH RBC QN AUTO: 25.3 PG (ref 27–34)
MCH RBC QN AUTO: 25.3 PG (ref 27–34)
MCHC RBC AUTO-ENTMCNC: 29.7 G/DL (ref 32–36)
MCHC RBC AUTO-ENTMCNC: 29.7 G/DL (ref 32–36)
MCV RBC AUTO: 85 FL (ref 80–100)
MCV RBC AUTO: 85 FL (ref 80–100)
MONOCYTES # BLD AUTO: 0.4 THOU/UL (ref 0–0.9)
MONOCYTES NFR BLD AUTO: 8 % (ref 2–10)
NEUTROPHILS # BLD AUTO: 2.9 THOU/UL (ref 2–7.7)
NEUTROPHILS NFR BLD AUTO: 60 % (ref 50–70)
PHOSPHATE SERPL-MCNC: 2.8 MG/DL (ref 2.5–4.5)
PLATELET # BLD AUTO: 293 THOU/UL (ref 140–440)
PLATELET # BLD AUTO: 293 THOU/UL (ref 140–440)
PMV BLD AUTO: 10.3 FL (ref 8.5–12.5)
POTASSIUM BLD-SCNC: 3.3 MMOL/L (ref 3.5–5)
POTASSIUM SERPL-SCNC: 3.3 MMOL/L (ref 3.5–5)
PROT SERPL-MCNC: 4.9 G/DL (ref 6–8)
PROT SERPL-MCNC: 4.9 G/DL (ref 6–8)
RBC # BLD AUTO: 3.28 MILL/UL (ref 3.8–5.4)
RBC # BLD AUTO: 3.28 MILL/UL (ref 3.8–5.4)
SODIUM SERPL-SCNC: 146 MMOL/L (ref 136–145)
SODIUM SERPL-SCNC: 146 MMOL/L (ref 136–145)
TRIGL SERPL-MCNC: 74 MG/DL
TRIGL SERPL-MCNC: 74 MG/DL
WBC # BLD AUTO: 4.9 THOU/UL (ref 4–11)
WBC: 4.9 THOU/UL (ref 4–11)

## 2021-02-15 PROCEDURE — 99310 SBSQ NF CARE HIGH MDM 45: CPT | Performed by: NURSE PRACTITIONER

## 2021-02-15 RX ORDER — OLANZAPINE 5 MG/1
2.5 TABLET ORAL AT BEDTIME
Qty: 30 TABLET | Refills: 4 | Status: SHIPPED | OUTPATIENT
Start: 2021-02-15

## 2021-02-15 ASSESSMENT — MIFFLIN-ST. JEOR: SCORE: 949.54

## 2021-02-15 NOTE — LETTER
2/15/2021        RE: Simran Negro  [address]   [facility] [address]        [facility]  [facility] Record Number:  [id number]  Place of Service where encounter took place:  [facility] [[id number]]  Chief Complaint   Patient presents with     RECHECK       HPI:    Simran Negro  is a 71 year old (1950), who is being seen today for an episodic care visit.  HPI information obtained from: facility chart records, facility staff, patient report, [facility] chart review and Care Everywhere [facility] chart review. Today's concern is:     Physical deconditioning  Generalized muscle weakness  Acute respiratory failure, unspecified whether with hypoxia or hypercapnia (H)  Aspiration pneumonitis (H)  Attempted suicide (H)  Accidental poisoning by corrosives and caustics, subsequent encounter  Unspecified mood (affective) disorder (H)  Major depressive disorder with current active episode, unspecified depression episode severity, unspecified whether recurrent  Post traumatic stress disorder  Pain  Acute disseminated encephalitis and encephalomyelitis  Swallowing impairment  Laryngitis  Gastroesophageal reflux disease without esophagitis  Type 2 diabetes mellitus without complication, unspecified whether long term insulin use (H)  CNS lymphoma (H)  Hypotension due to hypovolemia  PTSD (post-traumatic stress disorder)     In person interpretor used for communications needs today. Patient speaks only hmong language. Minimal english needs. Met with patient who denies any chest pain, palpitations, shortness of breath, MATHIS, lightheadedness, dizziness, or cough. She denies any sore throats. Denies any abdominal discomfort. Denies N&V. Denies B&B concerns. Denies dysuria or frequency. Denies loose or constipation. She remains NPO. Sleeping well.  When discussing with intrepretor patient has a hard time remaining on topic of discussion. She becomes fixated on wanting to go home and  she insists that family is willing to help her. Called BALA Peoples to inform of new POA contact of his brother eugene who re-enforces that Eugene should be first contact as patient does live with him primarily. Per family, if patient is able to eat she would be able to go back home. Notified family today that I suspect this might be limited due to the damage. I suspect patient may need life long nursing support as she is not ambulating and will need life long TPN needs due to risks. Did speak to patient who stated she is open to having hospice at home if needed. Did update family of this as well. SW to remain involved for safe discharge planning needs.       BP Readings from Last 3 Encounters:   02/15/21 110/64   02/09/21 123/62   09/23/20 105/55     Wt Readings from Last 5 Encounters:   02/15/21 50.3 kg (110 lb 14.4 oz)   02/09/21 45.8 kg (101 lb)   09/23/20 48.5 kg (107 lb)   09/23/20 48.7 kg (107 lb 4.8 oz)   06/08/20 55.6 kg (122 lb 8 oz)     Past Medical and Surgical History reviewed in Epic today.    MEDICATIONS:    Current Outpatient Medications   Medication Sig Dispense Refill     OLANZapine (ZYPREXA) 5 MG tablet Take 0.5 tablets (2.5 mg) by mouth At Bedtime AND 2.5mg daily PRN 30 tablet 4     acetaminophen (TYLENOL) 650 MG suppository Place 650 mg rectally every 6 hours as needed       Amino Ac Elect-Calc in D15W (CLINIMIX E 5/15) 5 % SOLN infusion Per Pharmacy dosing:  cyclic x 18 hours. 20 ml x 1 hour, increase to 60 ml x 16 hours, decrease to 20 ml x 1 hour.       insulin aspart (NOVOLOG PEN) 100 UNIT/ML pen Inject Subcutaneous every 6 hours Inject per sliding scale: 120-149 = 2 units; 150-199 = 3 units; 200-249 =  6 units; 250-299 = 9 units; 300-349 = 12 units; 350 or greater = 15 units and call MD if BGL is equal to or greater than 350 after 2 hour recheck       insulin isophane human (HUMULIN N PEN) 100 UNIT/ML injection Inject 7 Units Subcutaneous daily HOLD if Blood Glucose<80 3 mL 11     lipids  (INTRALIPID) 20 % infusion Inject 250 mLs into the vein daily       Mouthwashes (BIOTENE DRY MOUTH GENTLE) LIQD Take 5 mLs by mouth 3 times daily AND every 1hours  mL 11     pantoprazole (PROTONIX) 40 MG injection Inject 40 mg into the vein 2 times daily 60 each 11     sodium chloride 0.9% infusion Inject 125 mL/hr into the vein continuous 3000 mL 11     REVIEW OF SYSTEMS:  Unobtainable secondary to cognitive impairment. Due to limited english. Interpretor used however patient does not always answer questions appropriately.     Objective:  /64   Pulse 51   Temp 98  F (36.7  C)   Resp 12   Ht 1.524 m (5')   Wt 50.3 kg (110 lb 14.4 oz)   SpO2 96%   BMI 21.66 kg/m    Exam:  GENERAL APPEARANCE:  Alert, in no distress, thin, anxious, cooperative  ENT:  Mouth and posterior oropharynx normal, moist mucous membranes, Lime  EYES:  EOM, conjunctivae, lids, pupils and irises normal  NECK:  No adenopathy,masses or thyromegaly  RESP:  respiratory effort and palpation of chest normal, lungs clear to auscultation , no respiratory distress  CV:  Palpation and auscultation of heart done , regular rate and rhythm, no murmur, rub, or gallop, no edema, +2 pedal pulses  ABDOMEN:  normal bowel sounds, soft, nontender, no hepatosplenomegaly or other masses, no guarding or rebound  M/S:   Erendira lift transfers. Wheelchair bound  SKIN:  Inspection of skin and subcutaneous tissue baseline, Palpation of skin and subcutaneous tissue baseline  NEURO:   Cranial nerves 2-12 are normal tested and grossly at patient's baseline, no purposeful movement in upper and lower extremities  PSYCH:  oriented to self, insight and judgement impaired, memory impaired , affect and mood normal, anxious    Labs:     Most Recent 3 CBC's:  Recent Labs   Lab Test 09/23/20  1348 06/08/20  1253 03/04/20  1103   WBC 6.0 7.6 9.0   HGB 13.3 13.7 11.6*   MCV 83 87 85    271 350     Most Recent 3 BMP's:  Recent Labs   Lab Test 09/23/20  1348  06/08/20  1253 03/04/20  1103   * 140 140   POTASSIUM 3.7 4.5 3.3*   CHLORIDE 95 108 108   CO2 28 28 28   BUN 14 24 13   CR 0.84 0.90 0.80   ANIONGAP 7 4 4   EMMA 9.2 9.2 9.4   * 90 110*     Most Recent 2 LFT's:  Recent Labs   Lab Test 06/08/20  1253 03/04/20  1103   AST 15 14   ALT 25 20   ALKPHOS 72 104   BILITOTAL 0.8 0.5     Most Recent Hemoglobin A1c:  Recent Labs   Lab Test 08/27/14  0627   A1C 5.6     Most Recent Anemia Panel:  Recent Labs   Lab Test 09/23/20  1348   WBC 6.0   HGB 13.3   HCT 38.5   MCV 83        Recent labs today 2/15/21:  Sodium 146  Potassium 3.3  CO2 20  Chloride 115  Glucose 50  BUN 10  Creatinine 0.58  GFR >60  Bilirubin 0.7  Calcium 7.6  Protein 4.9  Albumin 2.3  Alkaline phosphate 102  AST 21  ALT 15  Magnesium 1.7  Triglycerides 74  Phosphorus 2.8  WBC 4.9  RBC 3.28  Hgb 8.3  hematocrit 27.9  Platelet 293      ASSESSMENT/PLAN:  (C85.89) CNS lymphoma (H)  (primary encounter diagnosis)  (R53.81) Physical deconditioning  (M62.81) Generalized muscle weakness  Comment: Acute on chronic. S/T below diagnosis  Plan:   -Continue Physical therapy, Occupational therapy and Speech and Language therapy as directed  -SW to remain involve for safe discharge planning needs  -Recommend LTC needs at this time due to current status  -Would recommend home with hospice care and goals if warranted     (J96.00) Acute respiratory failure, unspecified whether with hypoxia or hypercapnia (H)  (J69.0) Aspiration pneumonitis (H)  (T14.91XA) Attempted suicide (H)  (T54.91XD) Accidental poisoning by corrosives and caustics, subsequent encounter  (F43.10) PTSD  Comment: Acute on chronic. NOT AT GOAL Developed a fever with some shortness of breath. She completed course of antibiotics of Zosyn with good relief for pneumonia concerns. Suicidal ideation where patient ingested sulfuric acid (santeen ) on 1/19/21. Mental health documents when speaking to son Richelle, he indicates that patient  does not have any formal psychiatric history. He does note a history of what he identifies as depression in the past when going through chemo but never formally diagnosed. He does report she has been increasingly more forgetful but she has never been tested for a neurocognitive disorder. When asked if he thinks this was a suicide attempt, he strongly disagrees. He believes patient was confused and mistook chemical for something ingestible like soda or water because it was left out on the kitchen counter after son used it to unclog the sink. CT chest on 1/20 shows no evidence of mediastinitis. Laryngoscopy in ED shoed mucosal edema. EGD 1/20- diffuse mucosal changes throughout the entire esophagus. GI had signed off, they recommend outpatient follow up.   Plan:   -Continue Occupational therapy, Occupational therapy and Speech and Language therapy as directed  -Continue PPI IV form BID due to NPO status at this time.   -Monitor respiratory status  -Continue in house psych eval and treatment for ongoing needs  -Would recommend home with hospice care and goals if warranted  -Will change Zyprexa to 2.5mg at HS scheduled along with daily PRN dose as well  -Weekly labs on mondays for: CMP, CBC with diff, magnesium, triglycerides, and phosphorus.      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 NOT AT GOAL. Remains confused and restless often. She was needing to have 1:1 sitters in the hospital which was removed by hospital psych. [facility] but due to NPO status patient is unable to start remeron therefore options very limited. Family declined inpatient psychiatric admission and does not feel this  "was a true suicidal attempt. Per behavior health assessment on 2/2/21: Patient does not remember the events that lead her to be hospitalized. When asked specifically if she drank something to kill herself, she replied \" I would never do that.\" Patient endorses being sad sometimes and worries about things at home. She stated she worries at home because she isn't able to do much as she used to be able to. She reports that she has help all the time between her 2 sons and a PCA. She denies any safety issues at home. When asked about what helps her manage her worries at home, she states her sons keep her very busy.\" Behavioral health assessed patient as LOW RISK. No current suicidal ideation or behaviors that suggest otherwise. Mental health documents when speaking to son Richelle, he indicates that patient does not have any formal psychiatric history. He does note a history of what he identifies as depression in the past when going through chemo but never formally diagnosed. He does report she has been increasingly more forgetful but she has never been tested for a neurocognitive disorder. When asked if he thinks this was a suicide attempt, he strongly disagrees. He believes patient was confused and mistook chemical for something ingestible like soda or water because it was left out on the kitchen counter after son used it to unclog the sink.   Plan:   -Continue house psych eval and treatment  -Continue in house psych to assess neuro-cognition status for ongoing needs  -Would recommend home with hospice care and goals if warranted  -Change Zyprexa to 2.5mg at HS and daily PRN   -Monitor mood and behaviors  -Tylenol PRN rectal only.   -Monitor for changes in sleeping patterns  -Weekly labs on mondays for: CMP, CBC with diff, magnesium, triglycerides, and phosphorus.      (R13.10) Swallowing impairment  (J04.0) Laryngitis  (K21.9) Gastroesophageal reflux disease without esophagitis  Comment: Acute on chronic S/T ingestion of " caustic substance above. Laryngoscopy in ED showed mucosal edema. GI Advised AGAINST NG or peg tube at this time due to esophagus damage due to acid ingestion and high risk of perforation. GI will see patient in clinic in 1-2 weeks for follow up/dicuss further options with possible peg placement if no progress with PO intake/SLP). GI recommends clear liquids but patient is choking on liquids at this time. Speech recommends continue NPO status. Patient requires significant suctioning after exhibiting wet cough prior to and immediately following initiation of pharyngeal swallow on all PO attempts of NTL via 1/2 tsp bolus by spoon.   Plan:   -Continue Physical therapy and Occupational therapy and Speech and Language therapy as directed  -ADAT if able  -TPN and lipids to be dosed per [facility] pharmacy as directed.   -Would recommend home with hospice care and goals if warranted  -Lipid infusion rate: 20.8ml/hr for 12 hours daily.   -TPN Clinimix E 5% Amino Acid, 15% Dextrose ml/hr (1000 ml daily), cyclic x 18 hours. 20 ml x 1 hour, increase to 60 ml x 16 hours, decrease to 20 ml x 1 hour.   -PICC non-valve catheter cares along with flush with NS 10mL NS before and after IV medications administration followed by heparin 10units/ml- give 5mL. -Maintenenace flush each lumen every 12 hours as well.   -Provide oral care with swab each shift for oral mucosa needs  -Speech and Language therapy allowing/recommending ice chips with licensed staff TID and PRN  -Continue artifical saliva to swish and spit 5mL TID and every hour PRN for mucosal needs.   -Follow up with [facility] in 1-2 weeks as directed. Pending appt  -Keep HOB elevated 30 degrees at all times due to aspiration risk.   -Weekly labs on mondays for: CMP, CBC with diff, magnesium, triglycerides, and phosphorus.      (E11.9) Type 2 diabetes mellitus without complication, unspecified whether long term insulin use (H)  Comment: Acute on chronic. S/T TPN needs. Appears patient  has been on s/s insulin as directed along with NPH BID, however evening dose of NPH has been held for the last several days. Blood Glucose values .   Plan:   -Monitor Blood Glucose QID as directed  -Continue ISS as directed  -NPH to 7units daily for now. HOLD if Blood Glucose<80.   -Monitor for worsening s/sx of concerns  -Weekly labs on mondays for: CMP, CBC with diff, magnesium, triglycerides, and phosphorus.      (I95.89) Hypotension due to hypovolemia  Comment: Acute on chronic S/T above diagnosis. Previously was on IVF continuous up until admission. No orders to start upon admission to SNF. Blood pressures improved with IVF  Plan:  -Continue NS 0.9% at 125ml/hr continuous order for now with good resolution. Will attempt discontinuation at next visit if able.   -Monitor BP and HR as directed  -Monitor for worsening s/sx of concerns  -Weekly labs on mondays for: CMP, CBC with diff, magnesium, triglycerides, and phosphorus.      (C85.89) CNS Lymphoma  Comment: Chronic. The patient is being followed by oncology at [facility].  is aware and will contact [facility] for further plans. Followed by DR Raza. Recommends brain MRI with contrast and will need to be scheduled outpatient.   Plan:  -Follow up with Dr Raza as directed. Will need Brain MRI with contrast prior to appt needs  -Would recommend home with hospice care and goals if warranted  -Monitor for worsening s/sx of concerns  -Weekly labs on mondays for: CMP, CBC with diff, magnesium, triglycerides, and phosphorus.      Orders written by provider at facility     Total time spent with patient visit at the skilled nursing facility was 60 min including patient visit and review of past records. Greater than 50% of total time spent with counseling and coordinating care with nursing staff due to the complexities of their diagnoses, review of HPI, development of POC, assisting HUC on appointment schedules, and patient education and review of POC with  patient. Total time spent also includes additional 15 minute phone discussion with patient's family on: current medications/orders changes, recent blood work results, continued discharge plan/needs, subsequent treatment plan while in TCU and thereafter, current pain control plan along with importance of compliance with current recommendations above and specialty care needs.       Electronically signed:  Madhuri Cantu DNP, APRN           Sincerely,        ARIEL Taylor CNP

## 2021-02-15 NOTE — PROGRESS NOTES
[facility]  [facility] Record Number:  [id number]  Place of Service where encounter took place:  [facility] ([facility]) [[id number]]  Chief Complaint   Patient presents with     RECHECK       HPI:    Simran Negro  is a 71 year old (1950), who is being seen today for an episodic care visit.  HPI information obtained from: facility chart records, facility staff, patient report, [facility] chart review and Care Everywhere Epic chart review. Today's concern is:     Physical deconditioning  Generalized muscle weakness  Acute respiratory failure, unspecified whether with hypoxia or hypercapnia (H)  Aspiration pneumonitis (H)  Attempted suicide (H)  Accidental poisoning by corrosives and caustics, subsequent encounter  Unspecified mood (affective) disorder (H)  Major depressive disorder with current active episode, unspecified depression episode severity, unspecified whether recurrent  Post traumatic stress disorder  Pain  Acute disseminated encephalitis and encephalomyelitis  Swallowing impairment  Laryngitis  Gastroesophageal reflux disease without esophagitis  Type 2 diabetes mellitus without complication, unspecified whether long term insulin use (H)  CNS lymphoma (H)  Hypotension due to hypovolemia  PTSD (post-traumatic stress disorder)     In person interpretor used for communications needs today. Patient speaks only hmong language. Minimal english needs. Met with patient who denies any chest pain, palpitations, shortness of breath, MATHIS, lightheadedness, dizziness, or cough. She denies any sore throats. Denies any abdominal discomfort. Denies N&V. Denies B&B concerns. Denies dysuria or frequency. Denies loose or constipation. She remains NPO. Sleeping well.  When discussing with intrepretor patient has a hard time remaining on topic of discussion. She becomes fixated on wanting to go home and she insists that family is willing to help her. Called BALA Peoples to inform of new POA  contact of his brother eugene who re-enforces that Eugene should be first contact as patient does live with him primarily. Per family, if patient is able to eat she would be able to go back home. Notified family today that I suspect this might be limited due to the damage. I suspect patient may need life long nursing support as she is not ambulating and will need life long TPN needs due to risks. Did speak to patient who stated she is open to having hospice at home if needed. Did update family of this as well. SW to remain involved for safe discharge planning needs.       BP Readings from Last 3 Encounters:   02/15/21 110/64   02/09/21 123/62   09/23/20 105/55     Wt Readings from Last 5 Encounters:   02/15/21 50.3 kg (110 lb 14.4 oz)   02/09/21 45.8 kg (101 lb)   09/23/20 48.5 kg (107 lb)   09/23/20 48.7 kg (107 lb 4.8 oz)   06/08/20 55.6 kg (122 lb 8 oz)     Past Medical and Surgical History reviewed in Epic today.    MEDICATIONS:    Current Outpatient Medications   Medication Sig Dispense Refill     OLANZapine (ZYPREXA) 5 MG tablet Take 0.5 tablets (2.5 mg) by mouth At Bedtime AND 2.5mg daily PRN 30 tablet 4     acetaminophen (TYLENOL) 650 MG suppository Place 650 mg rectally every 6 hours as needed       Amino Ac Elect-Calc in D15W (CLINIMIX E 5/15) 5 % SOLN infusion Per Pharmacy dosing:  cyclic x 18 hours. 20 ml x 1 hour, increase to 60 ml x 16 hours, decrease to 20 ml x 1 hour.       insulin aspart (NOVOLOG PEN) 100 UNIT/ML pen Inject Subcutaneous every 6 hours Inject per sliding scale: 120-149 = 2 units; 150-199 = 3 units; 200-249 =  6 units; 250-299 = 9 units; 300-349 = 12 units; 350 or greater = 15 units and call MD if BGL is equal to or greater than 350 after 2 hour recheck       insulin isophane human (HUMULIN N PEN) 100 UNIT/ML injection Inject 7 Units Subcutaneous daily HOLD if Blood Glucose<80 3 mL 11     lipids (INTRALIPID) 20 % infusion Inject 250 mLs into the vein daily       Mouthwashes (BIOTENE DRY  MOUTH GENTLE) LIQD Take 5 mLs by mouth 3 times daily AND every 1hours  mL 11     pantoprazole (PROTONIX) 40 MG injection Inject 40 mg into the vein 2 times daily 60 each 11     sodium chloride 0.9% infusion Inject 125 mL/hr into the vein continuous 3000 mL 11     REVIEW OF SYSTEMS:  Unobtainable secondary to cognitive impairment. Due to limited english. Interpretor used however patient does not always answer questions appropriately.     Objective:  /64   Pulse 51   Temp 98  F (36.7  C)   Resp 12   Ht 1.524 m (5')   Wt 50.3 kg (110 lb 14.4 oz)   SpO2 96%   BMI 21.66 kg/m    Exam:  GENERAL APPEARANCE:  Alert, in no distress, thin, anxious, cooperative  ENT:  Mouth and posterior oropharynx normal, moist mucous membranes, [place]  EYES:  EOM, conjunctivae, lids, pupils and irises normal  NECK:  No adenopathy,masses or thyromegaly  RESP:  respiratory effort and palpation of chest normal, lungs clear to auscultation , no respiratory distress  CV:  Palpation and auscultation of heart done , regular rate and rhythm, no murmur, rub, or gallop, no edema, +2 pedal pulses  ABDOMEN:  normal bowel sounds, soft, nontender, no hepatosplenomegaly or other masses, no guarding or rebound  M/S:   Erendira lift transfers. Wheelchair bound  SKIN:  Inspection of skin and subcutaneous tissue baseline, Palpation of skin and subcutaneous tissue baseline  NEURO:   Cranial nerves 2-12 are normal tested and grossly at patient's baseline, no purposeful movement in upper and lower extremities  PSYCH:  oriented to self, insight and judgement impaired, memory impaired , affect and mood normal, anxious    Labs:     Most Recent 3 CBC's:  Recent Labs   Lab Test 09/23/20  1348 06/08/20  1253 03/04/20  1103   WBC 6.0 7.6 9.0   HGB 13.3 13.7 11.6*   MCV 83 87 85    271 350     Most Recent 3 BMP's:  Recent Labs   Lab Test 09/23/20  1348 06/08/20  1253 03/04/20  1103   * 140 140   POTASSIUM 3.7 4.5 3.3*   CHLORIDE 95 108 108   CO2  28 28 28   BUN 14 24 13   CR 0.84 0.90 0.80   ANIONGAP 7 4 4   EMMA 9.2 9.2 9.4   * 90 110*     Most Recent 2 LFT's:  Recent Labs   Lab Test 06/08/20  1253 03/04/20  1103   AST 15 14   ALT 25 20   ALKPHOS 72 104   BILITOTAL 0.8 0.5     Most Recent Hemoglobin A1c:  Recent Labs   Lab Test 08/27/14  0627   A1C 5.6     Most Recent Anemia Panel:  Recent Labs   Lab Test 09/23/20  1348   WBC 6.0   HGB 13.3   HCT 38.5   MCV 83        Recent labs today 2/15/21:  Sodium 146  Potassium 3.3  CO2 20  Chloride 115  Glucose 50  BUN 10  Creatinine 0.58  GFR >60  Bilirubin 0.7  Calcium 7.6  Protein 4.9  Albumin 2.3  Alkaline phosphate 102  AST 21  ALT 15  Magnesium 1.7  Triglycerides 74  Phosphorus 2.8  WBC 4.9  RBC 3.28  Hgb 8.3  hematocrit 27.9  Platelet 293      ASSESSMENT/PLAN:  (C85.89) CNS lymphoma (H)  (primary encounter diagnosis)  (R53.81) Physical deconditioning  (M62.81) Generalized muscle weakness  Comment: Acute on chronic. S/T below diagnosis  Plan:   -Continue Physical therapy, Occupational therapy and Speech and Language therapy as directed  -SW to remain involve for safe discharge planning needs  -Recommend LTC needs at this time due to current status  -Would recommend home with hospice care and goals if warranted     (J96.00) Acute respiratory failure, unspecified whether with hypoxia or hypercapnia (H)  (J69.0) Aspiration pneumonitis (H)  (T14.91XA) Attempted suicide (H)  (T54.91XD) Accidental poisoning by corrosives and caustics, subsequent encounter  (F43.10) PTSD  Comment: Acute on chronic. NOT AT GOAL Developed a fever with some shortness of breath. She completed course of antibiotics of Zosyn with good relief for pneumonia concerns. Suicidal ideation where patient ingested sulfuric acid (santeen ) on 1/19/21. Mental health documents when speaking to son Richelle, he indicates that patient does not have any formal psychiatric history. He does note a history of what he identifies as  depression in the past when going through chemo but never formally diagnosed. He does report she has been increasingly more forgetful but she has never been tested for a neurocognitive disorder. When asked if he thinks this was a suicide attempt, he strongly disagrees. He believes patient was confused and mistook chemical for something ingestible like soda or water because it was left out on the kitchen counter after son used it to unclog the sink. CT chest on 1/20 shows no evidence of mediastinitis. Laryngoscopy in ED shoed mucosal edema. EGD 1/20- diffuse mucosal changes throughout the entire esophagus. GI had signed off, they recommend outpatient follow up.   Plan:   -Continue Occupational therapy, Occupational therapy and Speech and Language therapy as directed  -Continue PPI IV form BID due to NPO status at this time.   -Monitor respiratory status  -Continue in house psych eval and treatment for ongoing needs  -Would recommend home with hospice care and goals if warranted  -Will change Zyprexa to 2.5mg at HS scheduled along with daily PRN dose as well  -Weekly labs on mondays for: CMP, CBC with diff, magnesium, triglycerides, and phosphorus.      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 NOT AT GOAL. Remains confused and restless often. She was needing to have 1:1 sitters in the hospital which was removed by hospital psych. [facility] but due to NPO status patient is unable to start remeron therefore options very limited. Family declined inpatient psychiatric admission and does not feel this was a true suicidal attempt. Per behavior health assessment on 2/2/21: Patient does not  "remember the events that lead her to be hospitalized. When asked specifically if she drank something to kill herself, she replied \" I would never do that.\" Patient endorses being sad sometimes and worries about things at home. She stated she worries at home because she isn't able to do much as she used to be able to. She reports that she has help all the time between her 2 sons and a PCA. She denies any safety issues at home. When asked about what helps her manage her worries at home, she states her sons keep her very busy.\" Behavioral health assessed patient as LOW RISK. No current suicidal ideation or behaviors that suggest otherwise. Mental health documents when speaking to son Richelle, he indicates that patient does not have any formal psychiatric history. He does note a history of what he identifies as depression in the past when going through chemo but never formally diagnosed. He does report she has been increasingly more forgetful but she has never been tested for a neurocognitive disorder. When asked if he thinks this was a suicide attempt, he strongly disagrees. He believes patient was confused and mistook chemical for something ingestible like soda or water because it was left out on the kitchen counter after son used it to unclog the sink.   Plan:   -Continue house psych eval and treatment  -Continue in house psych to assess neuro-cognition status for ongoing needs  -Would recommend home with hospice care and goals if warranted  -Change Zyprexa to 2.5mg at HS and daily PRN   -Monitor mood and behaviors  -Tylenol PRN rectal only.   -Monitor for changes in sleeping patterns  -Weekly labs on mondays for: CMP, CBC with diff, magnesium, triglycerides, and phosphorus.      (R13.10) Swallowing impairment  (J04.0) Laryngitis  (K21.9) Gastroesophageal reflux disease without esophagitis  Comment: Acute on chronic S/T ingestion of caustic substance above. Laryngoscopy in ED showed mucosal edema. GI Advised AGAINST NG " or peg tube at this time due to esophagus damage due to acid ingestion and high risk of perforation. GI will see patient in clinic in 1-2 weeks for follow up/dicuss further options with possible peg placement if no progress with PO intake/SLP). GI recommends clear liquids but patient is choking on liquids at this time. Speech recommends continue NPO status. Patient requires significant suctioning after exhibiting wet cough prior to and immediately following initiation of pharyngeal swallow on all PO attempts of NTL via 1/2 tsp bolus by spoon.   Plan:   -Continue Physical therapy and Occupational therapy and Speech and Language therapy as directed  -ADAT if able  -TPN and lipids to be dosed per [facility] as directed.   -Would recommend home with hospice care and goals if warranted  -Lipid infusion rate: 20.8ml/hr for 12 hours daily.   -TPN Clinimix E 5% Amino Acid, 15% Dextrose ml/hr (1000 ml daily), cyclic x 18 hours. 20 ml x 1 hour, increase to 60 ml x 16 hours, decrease to 20 ml x 1 hour.   -PICC non-valve catheter cares along with flush with NS 10mL NS before and after IV medications administration followed by heparin 10units/ml- give 5mL. -Maintenenace flush each lumen every 12 hours as well.   -Provide oral care with swab each shift for oral mucosa needs  -Speech and Language therapy allowing/recommending ice chips with licensed staff TID and PRN  -Continue artifical saliva to swish and spit 5mL TID and every hour PRN for mucosal needs.   -Follow up with [facility] in 1-2 weeks as directed. Pending appt  -Keep HOB elevated 30 degrees at all times due to aspiration risk.   -Weekly labs on mondays for: CMP, CBC with diff, magnesium, triglycerides, and phosphorus.      (E11.9) Type 2 diabetes mellitus without complication, unspecified whether long term insulin use (H)  Comment: Acute on chronic. S/T TPN needs. Appears patient has been on s/s insulin as directed along with NPH BID, however evening dose of NPH has  been held for the last several days. Blood Glucose values .   Plan:   -Monitor Blood Glucose QID as directed  -Continue ISS as directed  -NPH to 7units daily for now. HOLD if Blood Glucose<80.   -Monitor for worsening s/sx of concerns  -Weekly labs on mondays for: CMP, CBC with diff, magnesium, triglycerides, and phosphorus.      (I95.89) Hypotension due to hypovolemia  Comment: Acute on chronic S/T above diagnosis. Previously was on IVF continuous up until admission. No orders to start upon admission to SNF. Blood pressures improved with IVF  Plan:  -Continue NS 0.9% at 125ml/hr continuous order for now with good resolution. Will attempt discontinuation at next visit if able.   -Monitor BP and HR as directed  -Monitor for worsening s/sx of concerns  -Weekly labs on mondays for: CMP, CBC with diff, magnesium, triglycerides, and phosphorus.      (C85.89) CNS Lymphoma  Comment: Chronic. The patient is being followed by oncology at [facility].  is aware and will contact [facility] for further plans. Followed by DR Raza. Recommends brain MRI with contrast and will need to be scheduled outpatient.   Plan:  -Follow up with Dr Raza as directed. Will need Brain MRI with contrast prior to appt needs  -Would recommend home with hospice care and goals if warranted  -Monitor for worsening s/sx of concerns  -Weekly labs on mondays for: CMP, CBC with diff, magnesium, triglycerides, and phosphorus.      Orders written by provider at facility     Total time spent with patient visit at the skilled nursing facility was 60 min including patient visit and review of past records. Greater than 50% of total time spent with counseling and coordinating care with nursing staff due to the complexities of their diagnoses, review of HPI, development of POC, assisting HUC on appointment schedules, and patient education and review of POC with patient. Total time spent also includes additional 15 minute phone discussion with  patient's family on: current medications/orders changes, recent blood work results, continued discharge plan/needs, subsequent treatment plan while in TCU and thereafter, current pain control plan along with importance of compliance with current recommendations above and specialty care needs.       Electronically signed:  Madhuri Cantu DNP, APRN

## 2021-02-16 DIAGNOSIS — E86.1 HYPOTENSION DUE TO HYPOVOLEMIA: ICD-10-CM

## 2021-02-16 RX ORDER — SODIUM CHLORIDE 9 MG/ML
100 INJECTION, SOLUTION INTRAVENOUS CONTINUOUS
Qty: 3000 ML | Refills: 11
Start: 2021-02-16 | End: 2021-02-24

## 2021-02-16 NOTE — TELEPHONE ENCOUNTER
Comment: Received fax from [facility] specialty pharmacy for recommendations on further orders for TPN and etc. They recommend to reduce IVF continuously. Current dose if 125ml/hr due to hypotension needs.   Plan:  -Reduce IVF to 100ml/hr for now  -Pharmacy to continue to dose TPN and lipids accordingly to labs as directed  -Weekly labs on mondays.

## 2021-02-19 NOTE — PROGRESS NOTES
[facility]  INITIAL VISIT NOTE  February 22, 2021    PRIMARY CARE PROVIDER AND CLINIC:  [facility] [address] [facility] / [facility]     CHIEF COMPLAINT:  Hospital follow-up/Initial visit    HPI:    Simran Negro is a 71 year old  (1950) female who was seen at [facility] at [facility] on February 22, 2021 for an initial visit.     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 coli UTI, decubitus ulcer, and depression.    Summary of hospital course:  Patient was hospitalized at [facility] from January 19 through February 9, 2021 for ingestion of cleaning fluid (sulfuric acid) and acute hypoxic respiratory failure.  There was no evidence for esophageal perforation or mediastinitis on CT chest.  Laryngoscopy in ED showed mucosal edema.  EGD on January 20 revealed diffuse esophagitis and erythematous mucosa in the gastric body and antrum.  She was placed on proton pump inhibitor. and treated with IV Zosyn for aspiration pneumonia.  Given the risk of perforation with NG tube, she was started on TPN.  She was evaluated by psychiatry service while inpatient for suicidal ideation.    Patient is admitted to this facility for medical management, nursing care, and rehab.     Of note, history obtained from patient, facility RN, and extensive review of the chart necessitated by complex hospitalization.    Today's visit:  Patient was seen in her room while lying in bed.  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  She appears weak and frail but in no acute distress.  There is no report of fever, chills, chest pain, palpitation, nausea, vomiting, abdominal pain, urinary symptoms.  She is receiving her nutrition through TPN.    CODE STATUS:   DNR / DNI    PAST  MEDICAL HISTORY:   Ingestion of cleaning fluis In January 2021 resulting in diffuse caustic gastroesophagitis, aspiration pneumonitis, and acute respiratory failure   Diffuse large B-cell lymphoma with CNS relapse (initially diagnosed with DLBCL of the cervix in June 2007; treated with 6 cycles of R-CHOP and achieved complete response.  In March 2014, brain MRI revealed right parietal lobe enhancing lesions and biopsy confirmed diffuse large B cell lymphoma. She was treated with methotrexate, temozolomide and rituximab (MT-R) in March 2014 with subsequent complete clinical and radiographic response in August 2014.  Repeat MRI in September 2019 demonstrated 2 new intracranial masses in the left frontal and right frontal periventricular white matter. WBRT was started in October 2019 followed by Rituxan and dexamethasone.  Brain MRI in October 2020 showed no pathologic enhancement. Most recent MRI on February 3, 2021 also showed no acute abnormality. She follows with Dr. 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 coli UTI in August 2014  GERD  Major depression  PTSD  SLE  Sjogren's syndrome  Decubitus ulcer    Past Medical History:   Diagnosis Date     Anxiety      Cancer (H)     brain tumor/lymphoma     Depressive disorder      Dry eye syndrome      Gallstones      Lupus (H)     Patient denies having lupus     Seizures (H)      Sjogrens syndrome (H)        PAST SURGICAL HISTORY:   Past Surgical History:   Procedure Laterality Date     CHOLECYSTECTOMY       OPTICAL TRACKING SYSTEM BIOPSY BRAIN  3/19/2014    Procedure: OPTICAL TRACKING SYSTEM BIOPSY BRAIN;  Stealth Assisted Right Parietal Tumor Biopsy ;  Surgeon: Aristeo Tran MD;  Location: [facility] OR     Power Port Placement         FAMILY HISTORY:   Family History   Problem Relation Age of Onset     Cancer No family hx of      Diabetes No family hx of         SOCIAL HISTORY:  Patient is Hmong speaking lives with her son in a house.    Social History     Tobacco Use     Smoking status: Never Smoker     Smokeless tobacco: Never Used   Substance Use Topics     Alcohol use: No       MEDICATIONS:  Current Outpatient Medications   Medication Sig Dispense Refill     acetaminophen (TYLENOL) 650 MG suppository Place 650 mg rectally every 6 hours as needed       Amino Ac Elect-Calc in D15W (CLINIMIX E 5/15) 5 % SOLN infusion Per Pharmacy dosing:  cyclic x 18 hours. 20 ml x 1 hour, increase to 60 ml x 16 hours, decrease to 20 ml x 1 hour.       insulin aspart (NOVOLOG PEN) 100 UNIT/ML pen Inject Subcutaneous every 6 hours Inject per sliding scale: 120-149 = 2 units; 150-199 = 3 units; 200-249 =  6 units; 250-299 = 9 units; 300-349 = 12 units; 350 or greater = 15 units and call MD if BGL is equal to or greater than 350 after 2 hour recheck       insulin isophane human (HUMULIN N PEN) 100 UNIT/ML injection Inject 7 Units Subcutaneous daily HOLD if Blood Glucose<80 3 mL 11     lipids (INTRALIPID) 20 % infusion Inject 250 mLs into the vein daily       Mouthwashes (BIOTENE DRY MOUTH GENTLE) LIQD Take 5 mLs by mouth 3 times daily AND every 1hours  mL 11     OLANZapine (ZYPREXA) 5 MG tablet Take 0.5 tablets (2.5 mg) by mouth At Bedtime AND 2.5mg daily PRN 30 tablet 4     pantoprazole (PROTONIX) 40 MG injection Inject 40 mg into the vein 2 times daily 60 each 11     sodium chloride 0.9% infusion Inject 100 mL/hr into the vein continuous 3000 mL 11       ALLERGIES:  Allergies   Allergen Reactions     Hydromorphone Nausea and Vomiting       ROS:  10 point ROS was attempted but was limited, given patient's underlying cognitive impairment. It was reviewed as much as possible as outlined in HPI.    PHYSICAL EXAM:  Vital signs were reviewed in the chart.  Vital Signs: Blood pressure 103/65, heart rate 82, respiratory rate 20, temperature 98  F, oxygen saturation 100%, weight 101.6  LBS  General: Weak and frail appearing  HEENT: Normocephalic; oropharynx clear; conjunctival pallor  Cardiovascular: Normal S1, S2, RRR  Respiratory: Lungs clear to auscultation bilaterally  GI: Abdomen soft, non-tender, non-distended, +BS  Extremities: No significant LE edema  Neuro: Limited exam due to underlying encephalopathy  Psych: Somnolent  Skin: No acute rash    LABORATORY/IMAGING DATA:    Most Recent 3 CBC's:  Recent Labs   Lab Test 02/15/21 09/23/20  1348 06/08/20  1253   WBC 4.9 6.0 7.6   HGB 8.3* 13.3 13.7   MCV 85 83 87    268 271     Most Recent 3 BMP's:  Recent Labs   Lab Test 02/15/21 09/23/20  1348 06/08/20  1253   * 130* 140   POTASSIUM 3.3* 3.7 4.5   CHLORIDE 115* 95 108   CO2 20* 28 28   BUN 10 14 24   CR 0.58* 0.84 0.90   ANIONGAP 11 7 4   EMMA 7.6* 9.2 9.2   GLC 50* 171* 90     Most Recent 2 LFT's:  Recent Labs   Lab Test 02/15/21 06/08/20  1253   AST 21 15   ALT 15 25   ALKPHOS 102 72   BILITOTAL 0.7 0.8     Most Recent Cholesterol Panel:  Recent Labs   Lab Test 02/15/21   TRIG 74     Most Recent 6 Bacteria Isolates From Any Culture (See EPIC Reports for Culture Details):  Recent Labs   Lab Test 11/22/19  0851 10/18/19  0757 10/07/19  1006 09/23/19  1545 09/01/14  0647 09/01/14  0642   CULT <10,000 colonies/mL  mixed urogenital jorge alberto   >100,000 colonies/mL  Proteus vulgaris  * >100,000 colonies/mL  Proteus vulgaris  *  <10,000 colonies/mL  mixed urogenital jorge alberto  Susceptibility testing not routinely done   Culture negative after 4 weeks  No growth No growth No growth     Most Recent TSH and T4:No lab results found.  Most Recent Hemoglobin A1c:  Recent Labs   Lab Test 08/27/14  0627   A1C 5.6     Most Recent 6 glucoses:  Recent Labs   Lab Test 02/15/21 09/23/20  1348 06/08/20  1253 03/04/20  1103 11/22/19  0659 10/18/19  0718   GLC 50* 171* 90 110* 122* 130*       ASSESSMENT/PLAN:  Ingestion on cleaning fluid,  Caustic gastroesophagitis and laryngitis,  Acute hypoxic respiratory  failure,  Aspiration pneumonitis (RLL),  GERD.  Ingestion of cleaning fluid due to confusion and not due to suicide attempt per family report.  Patient completed the course of IV Zosyn in the hospital.  Patient is currently n.p.o.  Plan:  Continue pantoprazole 40 mg IV twice daily  Continue TPN (plan for possible G-tube placement on Friday, February 26)  Continue SLP evaluation and therapy to advance diet as tolerated  Continue to closely monitor renal function, electrolytes, and liver panel while on TPN    Encephalopathy.  Suspected due to underlying brain radiation for CNS lymphoma.  Reportedly the patient has become more confused recently.  Plan:  Implement the standard precaution measures for prevention of delirium  Monitor mental status  Cognitive evaluation by OT    Major depression,  PTSD.  Plan:  Continue olanzapine 2.5 mg p.o. at bedtime as well as PRN  In-house psych evaluation and treatment    DM2.  Blood glucose levels are currently in the range of .  There is no recent hemoglobin A1c available.  Plan:  Continue Novolin N 7 units in the morning  Continue sliding-scale NovoLog  Monitor blood glucose  Check Hgb A1c on Feb 23    Acute anemia.  Due to acute illness as well as possibly due to gastroesophagitis.  Hemoglobin was 12.4 on January 19 which gradually trended down to 9.2 on January 26. Last hemoglobin was 8.3 on February 15.  Plan  Monitor hemoglobin periodically    Diffuse large B-cell lymphoma with CNS relapse,  History of seizure.  Initially diagnosed with DLBCL of the cervix in June 2007; treated with 6 cycles of R-CHOP and achieved complete response.  In March 2014, brain MRI revealed right parietal lobe enhancing lesions and biopsy confirmed diffuse large B cell lymphoma. She was treated with methotrexate, temozolomide and rituximab (MT-R) in March 2014 with subsequent complete clinical and radiographic response in August 2014.  Repeat MRI in September 2019 demonstrated 2 new  intracranial masses in the left frontal and right frontal periventricular white matter. WBRT was started in October 2019 followed by Rituxan and dexamethasone.  Brain MRI in October 2020 showed no pathologic enhancement. Most recent MRI on February 3, 2021 also showed no acute abnormality. She follows with Dr. Dailey.  Plan:   Continue to follow-up with oncology as outpatient     Physical deconditioning.  Plan:  Continue PT/OT evaluation and therapy      Orders written by provider at facility:  Check hemoglobin A1c on Feb 23        Total unit/floor time of 60 minutes consisted of the following: examination of patient, reviewing the record including pertinent labs and imaging. More than 50% of this time was spent in coordination of care with the patient, nursing staff, and other healthcare providers. This time was spent on discussing the care plan including management of esophagitis, nutrition, cognitive impairment, discharge/safe placement, and specialty follow up.        Electronically signed by:  Gelacio Castro MD

## 2021-02-20 NOTE — PROGRESS NOTES
[facility]  [place] [facility] Record Number:  [id number]  Place of Service where encounter took place:  [place]  AT [facility] ([facility]) [[id number]]  Chief Complaint   Patient presents with     Nursing Home Acute       HPI:    Simran Negro  is a 71 year old (1950), who is being seen today for an episodic care visit.  HPI information obtained from: facility chart records, facility staff, patient report, [facility] chart review and Care Everywhere [facility] chart review. Today's concern is:     CNS lymphoma (H)  Ingestion of corrosive chemical, intentional self-harm, subsequent encounter  Aspiration pneumonitis (H)  Acute respiratory failure with hypoxia (H)  Gastroesophagitis  Type 2 diabetes mellitus without complication, unspecified whether long term insulin use (H)  Laryngitis  Encephalopathy  Major depressive disorder with current active episode, unspecified depression episode severity, unspecified whether recurrent  Post traumatic stress disorder  Acute anemia  Physical deconditioning  Hypotension due to hypovolemia  Generalized muscle weakness  Acute respiratory failure, unspecified whether with hypoxia or hypercapnia (H)  Attempted suicide (H)  Unspecified mood (affective) disorder (H)  Pain  Acute disseminated encephalitis and encephalomyelitis  Swallowing impairment  Gastroesophageal reflux disease without esophagitis  PTSD (post-traumatic stress disorder)  Thrush     Patient does not speak any english. Unable to communicate due to language barriers, aphasia and cognition today. Patient sitting up in wheelchair. On/off drowsiness. Per nursing report patient drowsiness fluctuates. Some days she is alert and awake while others she sleeps most of the day. Remains NPO diet along with TPN needs as directed. Pharmacy dosing TPN needs. Pending GI appt tomorrow to see if patient is able to receive a peg tube for nutritional support. Family unwilling to bring patient home with TPN needs, but they may be open  with tube feedings. IVF stopped on Monday 2/22/21 due to improvement of SBP. Levels have remains stable since discontinuation for now.     BP Readings from Last 3 Encounters:   02/23/21 109/57   02/22/21 115/72   02/15/21 110/64     Wt Readings from Last 5 Encounters:   02/23/21 46.8 kg (103 lb 1.6 oz)   02/22/21 46.1 kg (101 lb 9.6 oz)   02/15/21 50.3 kg (110 lb 14.4 oz)   02/09/21 45.8 kg (101 lb)   09/23/20 48.5 kg (107 lb)     Past Medical and Surgical History reviewed in Epic today.    MEDICATIONS:    Current Outpatient Medications   Medication Sig Dispense Refill     nystatin (MYCOSTATIN) 747182 UNIT/ML suspension Take 5 mLs (500,000 Units) by mouth 4 times daily for 14 days Apply to oral cavity with toothette QID for 2 weeks. 280 mL 0     acetaminophen (TYLENOL) 650 MG suppository Place 650 mg rectally every 6 hours as needed       Amino Ac Elect-Calc in D15W (CLINIMIX E 5/15) 5 % SOLN infusion Per Pharmacy dosing:  cyclic x 18 hours. 20 ml x 1 hour, increase to 60 ml x 16 hours, decrease to 20 ml x 1 hour.       insulin aspart (NOVOLOG PEN) 100 UNIT/ML pen Inject Subcutaneous every 6 hours Inject per sliding scale: 120-149 = 2 units; 150-199 = 3 units; 200-249 =  6 units; 250-299 = 9 units; 300-349 = 12 units; 350 or greater = 15 units and call MD if BGL is equal to or greater than 350 after 2 hour recheck       lipids (INTRALIPID) 20 % infusion Inject 250 mLs into the vein daily       Mouthwashes (BIOTENE DRY MOUTH GENTLE) LIQD Take 5 mLs by mouth 3 times daily AND every 1hours  mL 11     OLANZapine (ZYPREXA) 5 MG tablet Take 0.5 tablets (2.5 mg) by mouth At Bedtime AND 2.5mg daily PRN 30 tablet 4     pantoprazole (PROTONIX) 40 MG injection Inject 40 mg into the vein 2 times daily 60 each 11     REVIEW OF SYSTEMS:  Unobtainable secondary to cognitive impairment.  and Unobtainable secondary to aphasia.     Objective:  /57   Pulse 73   Temp 97.9  F (36.6  C)   Resp 17   Ht 1.524 m (5')   Wt  46.8 kg (103 lb 1.6 oz)   SpO2 91%   BMI 20.14 kg/m    Exam:  GENERAL APPEARANCE:  Alert, somnolent, cooperative  ENT:  Mouth and posterior oropharynx normal, moist mucous membranes, [place]  EYES:  EOM, conjunctivae, lids, pupils and irises normal  NECK:  No adenopathy,masses or thyromegaly  RESP:  respiratory effort and palpation of chest normal, lungs clear to auscultation , no respiratory distress  CV:  Palpation and auscultation of heart done , regular rate and rhythm, no murmur, rub, or gallop, no edema, +2 pedal pulses  ABDOMEN:  normal bowel sounds, soft, nontender, no hepatosplenomegaly or other masses, no guarding or rebound  M/S:   angelina lift transfers. wheelchair bound  SKIN:  Inspection of skin and subcutaneous tissue baseline, Palpation of skin and subcutaneous tissue baseline  NEURO:   Cranial nerves 2-12 are normal tested and grossly at patient's baseline, no purposeful movement in upper and lower extremities  PSYCH:  oriented to self, insight and judgement impaired, memory impaired , affect and mood normal    Labs:     Most Recent 3 CBC's:  Recent Labs   Lab Test 02/22/21 02/15/21 09/23/20  1348   WBC 5.8 4.9 6.0   HGB 7.8* 8.3* 13.3   MCV 86 85 83    293 268     Most Recent 3 BMP's:  Recent Labs   Lab Test 02/22/21 02/15/21 09/23/20  1348   * 146* 130*   POTASSIUM 2.4* 3.3* 3.7   CHLORIDE 114* 115* 95   CO2 24 20* 28   BUN 7* 10 14   CR 0.52* 0.58* 0.84   ANIONGAP 8 11 7   EMMA 7.0* 7.6* 9.2   GLC 69* 50* 171*     Most Recent 2 LFT's:  Recent Labs   Lab Test 02/22/21 02/15/21   AST 13 21   ALT <9 15   ALKPHOS 77 102   BILITOTAL 0.9 0.7     Most Recent 3 INR's:  Recent Labs   Lab Test 09/25/19  1041 09/20/19  1432 09/14/16  1208   INR 1.05 1.00 1.03     Most Recent 6 Bacteria Isolates From Any Culture (See EPIC Reports for Culture Details):  Recent Labs   Lab Test 11/22/19  0851 10/18/19  0757 10/07/19  1006 09/23/19  1545 09/01/14  0647 09/01/14  0642   CULT <10,000 colonies/mL  mixed  urogenital jorge alberto   >100,000 colonies/mL  Proteus vulgaris  * >100,000 colonies/mL  Proteus vulgaris  *  <10,000 colonies/mL  mixed urogenital jorge alberto  Susceptibility testing not routinely done   Culture negative after 4 weeks  No growth No growth No growth     Most Recent Hemoglobin A1c:  Recent Labs   Lab Test 08/27/14  0627   A1C 5.6     Most Recent Urinalysis:  Recent Labs   Lab Test 11/22/19  0851   COLOR Yellow   APPEARANCE Slightly Cloudy   URINEGLC Negative   URINEBILI Negative   URINEKETONE Negative   SG 1.012   UBLD Negative   URINEPH 7.0   PROTEIN Negative   NITRITE Negative   LEUKEST Large*   RBCU 1   WBCU 13*     Most Recent Anemia Panel:  Recent Labs   Lab Test 02/22/21   WBC 5.8   HGB 7.8*   HCT 25.4*   MCV 86          ASSESSMENT/PLAN:  (C85.89) CNS lymphoma (H)  (primary encounter diagnosis)  (R53.81) Physical deconditioning  (M62.81) Generalized muscle weakness  Comment: Acute on chronic. S/T below diagnosis  Plan:   -Continue Physical therapy, Occupational therapy and Speech and Language therapy as directed  -SW to remain involve for safe discharge planning needs  -Recommend LTC needs at this time due to current status  -Would recommend home with hospice care and goals if warranted     (J96.00) Acute respiratory failure, unspecified whether with hypoxia or hypercapnia (H)  (J69.0) Aspiration pneumonitis (H)  (T14.91XA) Attempted suicide (H)  (T54.91XD) Accidental poisoning by corrosives and caustics, subsequent encounter  (F43.10) PTSD  Comment: Acute on chronic. NOT AT GOAL Developed a fever with some shortness of breath. She completed course of antibiotics of Zosyn with good relief for pneumonia concerns. Suicidal ideation where patient ingested sulfuric acid (santeen ) on 1/19/21. Mental health documents when speaking to son Richelle, he indicates that patient does not have any formal psychiatric history. He does note a history of what he identifies as depression in the past when  going through chemo but never formally diagnosed. He does report she has been increasingly more forgetful but she has never been tested for a neurocognitive disorder. When asked if he thinks this was a suicide attempt, he strongly disagrees. He believes patient was confused and mistook chemical for something ingestible like soda or water because it was left out on the kitchen counter after son used it to unclog the sink. CT chest on 1/20 shows no evidence of mediastinitis. Laryngoscopy in ED shoed mucosal edema. EGD 1/20- diffuse mucosal changes throughout the entire esophagus. GI had signed off, they recommend outpatient follow up.   Plan:   -Continue Occupational therapy, Occupational therapy and Speech and Language therapy as directed  -Continue PPI IV form BID due to NPO status at this time.   -Monitor respiratory status  -Continue in house psych eval and treatment for ongoing needs  -Would recommend home with hospice care and goals if warranted  -Zyprexa to 2.5mg at HS scheduled along with daily PRN dose as well  -Weekly labs on mondays for: CMP, CBC with diff, magnesium, triglycerides, and phosphorus.      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 NOT AT GOAL. Remains confused and restless often. She was needing to have 1:1 sitters in the hospital which was removed by hospital psych. [facility] but due to NPO status patient is unable to start remeron therefore options very limited. Family declined inpatient psychiatric admission and does not feel this was a true suicidal attempt. Per behavior health assessment on 2/2/21: Patient does not remember the events that lead her to be  "hospitalized. When asked specifically if she drank something to kill herself, she replied \" I would never do that.\" Patient endorses being sad sometimes and worries about things at home. She stated she worries at home because she isn't able to do much as she used to be able to. She reports that she has help all the time between her 2 sons and a PCA. She denies any safety issues at home. When asked about what helps her manage her worries at home, she states her sons keep her very busy.\" Behavioral health assessed patient as LOW RISK. No current suicidal ideation or behaviors that suggest otherwise. Mental health documents when speaking to son Richelle, he indicates that patient does not have any formal psychiatric history. He does note a history of what he identifies as depression in the past when going through chemo but never formally diagnosed. He does report she has been increasingly more forgetful but she has never been tested for a neurocognitive disorder. When asked if he thinks this was a suicide attempt, he strongly disagrees. He believes patient was confused and mistook chemical for something ingestible like soda or water because it was left out on the kitchen counter after son used it to unclog the sink.   Plan:   -Continue house psych eval and treatment  -Continue in house psych to assess neuro-cognition status for ongoing needs  -Would recommend home with hospice care and goals if warranted  -Zyprexa to 2.5mg at HS and daily PRN   -Monitor mood and behaviors  -Tylenol PRN rectal only.   -Monitor for changes in sleeping patterns  -Weekly labs on mondays for: CMP, CBC with diff, magnesium, triglycerides, and phosphorus.      (R13.10) Swallowing impairment  (J04.0) Laryngitis  (K21.9) Gastroesophageal reflux disease without esophagitis  Comment: Acute on chronic S/T ingestion of caustic substance above. Laryngoscopy in ED showed mucosal edema. GI Advised AGAINST NG or peg tube at this time due to esophagus " damage due to acid ingestion and high risk of perforation. GI will see patient in clinic in 1-2 weeks for follow up/dicuss further options with possible peg placement if no progress with PO intake/SLP). GI recommends clear liquids but patient is choking on liquids at this time. Speech recommends continue NPO status. Patient requires significant suctioning after exhibiting wet cough prior to and immediately following initiation of pharyngeal swallow on all PO attempts of NTL via 1/2 tsp bolus by spoon.   Plan:   -Continue Physical therapy and Occupational therapy and Speech and Language therapy as directed  -ADAT if able. Currently remains NPO  -TPN and lipids to be dosed per [facility] pharmacy as directed.   -Would recommend home with hospice care and goals if warranted  -Lipid infusion rate: 20.8ml/hr for 12 hours daily.   -TPN Clinimix E 5% Amino Acid, 15% Dextrose ml/hr (1000 ml daily), cyclic x 18 hours. 20 ml x 1 hour, increase to 60 ml x 16 hours, decrease to 20 ml x 1 hour.   -PICC non-valve catheter cares along with flush with NS 10mL NS before and after IV medications administration followed by heparin 10units/ml- give 5mL. -Maintenenace flush each lumen every 12 hours as well.   -Provide oral care with swab each shift for oral mucosa needs  -Speech and Language therapy allowing/recommending ice chips with licensed staff TID and PRN  -Continue artifical saliva to swish and spit 5mL TID and every hour PRN for mucosal needs.   -Follow up with [facility] in 1-2 weeks as directed. Pending appt scheduled for 2/25/21  -Keep HOB elevated 30 degrees at all times due to aspiration risk.   -Weekly labs on mondays for: CMP, CBC with diff, magnesium, triglycerides, and phosphorus.      (E11.9) Type 2 diabetes mellitus without complication, unspecified whether long term insulin use (H)  Comment: Acute on chronic. S/T TPN needs. Appears patient has been on s/s insulin as directed along with NPH BID, however evening dose of NPH  has been held for the last several days. Blood Glucose values .   Plan:   -Monitor Blood Glucose QID as directed  -Continue ISS as directed for now. Goals to discontinue in near future.   -Discontinue NPH at this time,   -Pending hgb A1c level today.   -Monitor for worsening s/sx of concerns  -Weekly labs on mondays for: CMP, CBC with diff, magnesium, triglycerides, and phosphorus.      (I95.89) Hypotension due to hypovolemia  Comment: Acute on chronic S/T above diagnosis. Previously was on IVF continuous up until admission. No orders to start upon admission to SNF. Blood pressures improved with IVF. Stopped IVF on 2/22/21 due to improvement of BP levels along with lung congestion noted.   Plan:  -Monitor BP and HR as directed  -Stopped IVF due to SBP improvement.   -Monitor for worsening s/sx of concerns  -Weekly labs on mondays for: CMP, CBC with diff, magnesium, triglycerides, and phosphorus.      (C85.89) CNS Lymphoma  Comment: Chronic. The patient is being followed by oncology at [facility].  is aware and will contact [facility] for further plans. Followed by DR Raza. Recommends brain MRI with contrast and will need to be scheduled outpatient.   Plan:  -Follow up with Dr Raza as directed. Will need Brain MRI with contrast prior to appt needs  -Would recommend home with hospice care and goals if warranted  -Monitor for worsening s/sx of concerns  -Weekly labs on mondays for: CMP, CBC with diff, magnesium, triglycerides, and phosphorus.     (B37.0) Thrush  Comment: Acute on chronic. Suspect due to limited oral intake with suspicion that staff not providing oral cares as directed  Plan:  -Staff to provide good oral cares every shift as directed  -Start nystatin QID for 2 weeks. Staff to apply with toothette to oral cavity and tongue as directed.   -Ok for Ice chips with staff as directed  -Monitor for worsening s/sx of concerns.      Orders written by provider at facility     Electronically  signed:  Madhuri Catnu DNP, APRN

## 2021-02-22 ENCOUNTER — RECORDS - HEALTHEAST (OUTPATIENT)
Dept: LAB | Facility: CLINIC | Age: 71
End: 2021-02-22

## 2021-02-22 ENCOUNTER — TRANSFERRED RECORDS (OUTPATIENT)
Dept: HEALTH INFORMATION MANAGEMENT | Facility: CLINIC | Age: 71
End: 2021-02-22

## 2021-02-22 ENCOUNTER — NURSING HOME VISIT (OUTPATIENT)
Dept: GERIATRICS | Facility: CLINIC | Age: 71
End: 2021-02-22
Payer: COMMERCIAL

## 2021-02-22 VITALS
DIASTOLIC BLOOD PRESSURE: 72 MMHG | RESPIRATION RATE: 14 BRPM | OXYGEN SATURATION: 99 % | BODY MASS INDEX: 19.94 KG/M2 | SYSTOLIC BLOOD PRESSURE: 115 MMHG | HEIGHT: 60 IN | HEART RATE: 79 BPM | WEIGHT: 101.6 LBS | TEMPERATURE: 96.9 F

## 2021-02-22 DIAGNOSIS — F43.10 POST TRAUMATIC STRESS DISORDER: ICD-10-CM

## 2021-02-22 DIAGNOSIS — T54.92XD: Primary | ICD-10-CM

## 2021-02-22 DIAGNOSIS — K20.90 GASTROESOPHAGITIS: ICD-10-CM

## 2021-02-22 DIAGNOSIS — J96.01 ACUTE RESPIRATORY FAILURE WITH HYPOXIA (H): ICD-10-CM

## 2021-02-22 DIAGNOSIS — D64.9 ACUTE ANEMIA: ICD-10-CM

## 2021-02-22 DIAGNOSIS — E11.9 TYPE 2 DIABETES MELLITUS WITHOUT COMPLICATION, UNSPECIFIED WHETHER LONG TERM INSULIN USE (H): ICD-10-CM

## 2021-02-22 DIAGNOSIS — G93.40 ENCEPHALOPATHY: ICD-10-CM

## 2021-02-22 DIAGNOSIS — J69.0 ASPIRATION PNEUMONITIS (H): ICD-10-CM

## 2021-02-22 DIAGNOSIS — K29.70 GASTROESOPHAGITIS: ICD-10-CM

## 2021-02-22 DIAGNOSIS — R53.81 PHYSICAL DECONDITIONING: ICD-10-CM

## 2021-02-22 DIAGNOSIS — C83.390 CNS LYMPHOMA: ICD-10-CM

## 2021-02-22 DIAGNOSIS — F32.9 MAJOR DEPRESSIVE DISORDER WITH CURRENT ACTIVE EPISODE, UNSPECIFIED DEPRESSION EPISODE SEVERITY, UNSPECIFIED WHETHER RECURRENT: ICD-10-CM

## 2021-02-22 DIAGNOSIS — J04.0 LARYNGITIS: ICD-10-CM

## 2021-02-22 LAB
ALBUMIN SERPL-MCNC: 2.2 G/DL (ref 3.5–5)
ALP SERPL-CCNC: 77 U/L (ref 45–120)
ALT SERPL W P-5'-P-CCNC: <9 U/L (ref 0–45)
ALT SERPL W P-5'-P-CCNC: <9 U/L (ref 0–45)
ALT SERPL-CCNC: <9 U/L (ref 0–45)
ANION GAP SERPL CALCULATED.3IONS-SCNC: 8 MMOL/L (ref 5–18)
ANION GAP SERPL CALCULATED.3IONS-SCNC: 8 MMOL/L (ref 5–18)
APTT PPP: 33 SECONDS (ref 24–37)
AST SERPL W P-5'-P-CCNC: 13 U/L (ref 0–40)
AST SERPL W P-5'-P-CCNC: 13 U/L (ref 0–40)
AST SERPL-CCNC: 13 U/L (ref 0–40)
BASOPHILS # BLD AUTO: 0 THOU/UL (ref 0–0.2)
BASOPHILS NFR BLD AUTO: 0 % (ref 0–2)
BILIRUB DIRECT SERPL-MCNC: 0.3 MG/DL
BILIRUB SERPL-MCNC: 0.9 MG/DL (ref 0–1)
BILIRUBIN DIRECT: 0.3 MG/DL
BUN SERPL-MCNC: 7 MG/DL (ref 8–22)
BUN SERPL-MCNC: 7 MG/DL (ref 8–22)
CALCIUM SERPL-MCNC: 7 MG/DL (ref 8.5–10.5)
CALCIUM SERPL-MCNC: 7 MG/DL (ref 8.5–10.5)
CHLORIDE BLD-SCNC: 114 MMOL/L (ref 98–107)
CHLORIDE SERPLBLD-SCNC: 114 MMOL/L (ref 98–107)
CO2 SERPL-SCNC: 24 MMOL/L (ref 22–31)
CO2 SERPL-SCNC: 24 MMOL/L (ref 22–31)
CREAT SERPL-MCNC: 0.52 MG/DL (ref 0.6–1.1)
CREAT SERPL-MCNC: 0.52 MG/DL (ref 0.6–1.1)
DIFFERENTIAL: ABNORMAL
EOSINOPHIL # BLD AUTO: 0.1 THOU/UL (ref 0–0.4)
EOSINOPHIL NFR BLD AUTO: 2 % (ref 0–6)
ERYTHROCYTE [DISTWIDTH] IN BLOOD BY AUTOMATED COUNT: 14.3 % (ref 11–14.5)
ERYTHROCYTE [DISTWIDTH] IN BLOOD BY AUTOMATED COUNT: 14.3 % (ref 11–14.5)
FASTING STATUS PATIENT QL REPORTED: NORMAL
GFR SERPL CREATININE-BSD FRML MDRD: >60 ML/MIN/1.73M2
GFR SERPL CREATININE-BSD FRML MDRD: >60 ML/MIN/1.73M2
GLUCOSE BLD-MCNC: 69 MG/DL (ref 70–125)
GLUCOSE SERPL-MCNC: 69 MG/DL (ref 70–125)
HCT VFR BLD AUTO: 25.4 % (ref 35–47)
HCT VFR BLD AUTO: 25.4 % (ref 35–47)
HEMOGLOBIN: 7.8 G/DL (ref 12–16)
HGB BLD-MCNC: 7.8 G/DL (ref 12–16)
IMM GRANULOCYTES # BLD: 0 THOU/UL
IMM GRANULOCYTES NFR BLD: 0 %
INR PPP: 1.2 (ref 0.9–1.1)
IRON SATN MFR SERPL: 12 % (ref 20–50)
IRON SERPL-MCNC: 18 UG/DL (ref 42–175)
LYMPHOCYTES # BLD AUTO: 1.4 THOU/UL (ref 0.8–4.4)
LYMPHOCYTES NFR BLD AUTO: 24 % (ref 20–40)
MAGNESIUM SERPL-MCNC: 1.6 MG/DL (ref 1.8–2.6)
MCH RBC QN AUTO: 26.4 PG (ref 27–34)
MCH RBC QN AUTO: 26.4 PG (ref 27–34)
MCHC RBC AUTO-ENTMCNC: 30.7 G/DL (ref 32–36)
MCHC RBC AUTO-ENTMCNC: 30.7 G/DL (ref 32–36)
MCV RBC AUTO: 86 FL (ref 80–100)
MCV RBC AUTO: 86 FL (ref 80–100)
MONOCYTES # BLD AUTO: 0.4 THOU/UL (ref 0–0.9)
MONOCYTES NFR BLD AUTO: 7 % (ref 2–10)
NEUTROPHILS # BLD AUTO: 3.9 THOU/UL (ref 2–7.7)
NEUTROPHILS NFR BLD AUTO: 66 % (ref 50–70)
PHOSPHATE SERPL-MCNC: 2.9 MG/DL (ref 2.5–4.5)
PLATELET # BLD AUTO: 225 THOU/UL (ref 140–440)
PLATELET # BLD AUTO: 225 THOU/UL (ref 140–440)
PMV BLD AUTO: 10.7 FL (ref 8.5–12.5)
POTASSIUM BLD-SCNC: 2.4 MMOL/L (ref 3.5–5)
POTASSIUM SERPL-SCNC: 2.4 MMOL/L (ref 3.5–5)
PROT SERPL-MCNC: 4.2 G/DL (ref 6–8)
RBC # BLD AUTO: 2.95 MILL/UL (ref 3.8–5.4)
RBC # BLD AUTO: 2.95 MILL/UL (ref 3.8–5.4)
SODIUM SERPL-SCNC: 146 MMOL/L (ref 136–145)
SODIUM SERPL-SCNC: 146 MMOL/L (ref 136–145)
TIBC SERPL-MCNC: 147 UG/DL (ref 313–563)
TRANSFERRIN SERPL-MCNC: 117 MG/DL (ref 212–360)
TRIGL SERPL-MCNC: 86 MG/DL
TRIGL SERPL-MCNC: 86 MG/DL
WBC # BLD AUTO: 5.8 THOU/UL (ref 4–11)
WBC: 5.8 THOU/UL (ref 4–11)

## 2021-02-22 PROCEDURE — 99306 1ST NF CARE HIGH MDM 50: CPT | Performed by: INTERNAL MEDICINE

## 2021-02-22 ASSESSMENT — MIFFLIN-ST. JEOR: SCORE: 907.35

## 2021-02-22 NOTE — LETTER
2/22/2021        RE: Simran Negro  [address]   [facility] [address]        [facility]  INITIAL VISIT NOTE  February 22, 2021    PRIMARY CARE PROVIDER AND CLINIC:  Ari Flor [facility] [address] / [facility]     CHIEF COMPLAINT:  Hospital follow-up/Initial visit    HPI:    Simran Negro is a 71 year old  (1950) female who was seen at [facility] at [facility] on February 22, 2021 for an initial visit.     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 coli UTI, decubitus ulcer, and depression.    Summary of hospital course:  Patient was hospitalized at [facility] from January 19 through February 9, 2021 for ingestion of cleaning fluid (sulfuric acid) and acute hypoxic respiratory failure.  There was no evidence for esophageal perforation or mediastinitis on CT chest.  Laryngoscopy in ED showed mucosal edema.  EGD on January 20 revealed diffuse esophagitis and erythematous mucosa in the gastric body and antrum.  She was placed on proton pump inhibitor. and treated with IV Zosyn for aspiration pneumonia.  Given the risk of perforation with NG tube, she was started on TPN.  She was evaluated by psychiatry service while inpatient for suicidal ideation.    Patient is admitted to this facility for medical management, nursing care, and rehab.     Of note, history obtained from patient, facility RN, and extensive review of the chart necessitated by complex hospitalization.    Today's visit:  Patient was seen in her room while lying in bed.  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  She appears weak and frail but in no acute distress.  There is no report of fever, chills, chest pain, palpitation, nausea, vomiting, abdominal pain, urinary  symptoms.  She is receiving her nutrition through TPN.    CODE STATUS:   DNR / DNI    PAST MEDICAL HISTORY:   Ingestion of cleaning fluis In January 2021 resulting in diffuse caustic gastroesophagitis, aspiration pneumonitis, and acute respiratory failure   Diffuse large B-cell lymphoma with CNS relapse (initially diagnosed with DLBCL of the cervix in June 2007; treated with 6 cycles of R-CHOP and achieved complete response.  In March 2014, brain MRI revealed right parietal lobe enhancing lesions and biopsy confirmed diffuse large B cell lymphoma. She was treated with methotrexate, temozolomide and rituximab (MT-R) in March 2014 with subsequent complete clinical and radiographic response in August 2014.  Repeat MRI in September 2019 demonstrated 2 new intracranial masses in the left frontal and right frontal periventricular white matter. WBRT was started in October 2019 followed by Rituxan and dexamethasone.  Brain MRI in October 2020 showed no pathologic enhancement. Most recent MRI on February 3, 2021 also showed no acute abnormality. She follows with Dr. 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 coli UTI in August 2014  GERD  Major depression  PTSD  SLE  Sjogren's syndrome  Decubitus ulcer    Past Medical History:   Diagnosis Date     Anxiety      Cancer (H)     brain tumor/lymphoma     Depressive disorder      Dry eye syndrome      Gallstones      Lupus (H)     Patient denies having lupus     Seizures (H)      Sjogrens syndrome (H)        PAST SURGICAL HISTORY:   Past Surgical History:   Procedure Laterality Date     CHOLECYSTECTOMY       OPTICAL TRACKING SYSTEM BIOPSY BRAIN  3/19/2014    Procedure: OPTICAL TRACKING SYSTEM BIOPSY BRAIN;  Stealth Assisted Right Parietal Tumor Biopsy ;  Surgeon: Aristeo Tran MD;  Location: [facility] OR     Power Port Placement         FAMILY HISTORY:   Family History    Problem Relation Age of Onset     Cancer No family hx of      Diabetes No family hx of        SOCIAL HISTORY:  Patient is Hmong speaking lives with her son in a house.    Social History     Tobacco Use     Smoking status: Never Smoker     Smokeless tobacco: Never Used   Substance Use Topics     Alcohol use: No       MEDICATIONS:  Current Outpatient Medications   Medication Sig Dispense Refill     acetaminophen (TYLENOL) 650 MG suppository Place 650 mg rectally every 6 hours as needed       Amino Ac Elect-Calc in D15W (CLINIMIX E 5/15) 5 % SOLN infusion Per Pharmacy dosing:  cyclic x 18 hours. 20 ml x 1 hour, increase to 60 ml x 16 hours, decrease to 20 ml x 1 hour.       insulin aspart (NOVOLOG PEN) 100 UNIT/ML pen Inject Subcutaneous every 6 hours Inject per sliding scale: 120-149 = 2 units; 150-199 = 3 units; 200-249 =  6 units; 250-299 = 9 units; 300-349 = 12 units; 350 or greater = 15 units and call MD if BGL is equal to or greater than 350 after 2 hour recheck       insulin isophane human (HUMULIN N PEN) 100 UNIT/ML injection Inject 7 Units Subcutaneous daily HOLD if Blood Glucose<80 3 mL 11     lipids (INTRALIPID) 20 % infusion Inject 250 mLs into the vein daily       Mouthwashes (BIOTENE DRY MOUTH GENTLE) LIQD Take 5 mLs by mouth 3 times daily AND every 1hours  mL 11     OLANZapine (ZYPREXA) 5 MG tablet Take 0.5 tablets (2.5 mg) by mouth At Bedtime AND 2.5mg daily PRN 30 tablet 4     pantoprazole (PROTONIX) 40 MG injection Inject 40 mg into the vein 2 times daily 60 each 11     sodium chloride 0.9% infusion Inject 100 mL/hr into the vein continuous 3000 mL 11       ALLERGIES:  Allergies   Allergen Reactions     Hydromorphone Nausea and Vomiting       ROS:  10 point ROS was attempted but was limited, given patient's underlying cognitive impairment. It was reviewed as much as possible as outlined in HPI.    PHYSICAL EXAM:  Vital signs were reviewed in the chart.  Vital Signs: Blood pressure 103/65,  heart rate 82, respiratory rate 20, temperature 98  F, oxygen saturation 100%, weight 101.6 LBS  General: Weak and frail appearing  HEENT: Normocephalic; oropharynx clear; conjunctival pallor  Cardiovascular: Normal S1, S2, RRR  Respiratory: Lungs clear to auscultation bilaterally  GI: Abdomen soft, non-tender, non-distended, +BS  Extremities: No significant LE edema  Neuro: Limited exam due to underlying encephalopathy  Psych: Somnolent  Skin: No acute rash    LABORATORY/IMAGING DATA:    Most Recent 3 CBC's:  Recent Labs   Lab Test 02/15/21 09/23/20  1348 06/08/20  1253   WBC 4.9 6.0 7.6   HGB 8.3* 13.3 13.7   MCV 85 83 87    268 271     Most Recent 3 BMP's:  Recent Labs   Lab Test 02/15/21 09/23/20  1348 06/08/20  1253   * 130* 140   POTASSIUM 3.3* 3.7 4.5   CHLORIDE 115* 95 108   CO2 20* 28 28   BUN 10 14 24   CR 0.58* 0.84 0.90   ANIONGAP 11 7 4   EMMA 7.6* 9.2 9.2   GLC 50* 171* 90     Most Recent 2 LFT's:  Recent Labs   Lab Test 02/15/21 06/08/20  1253   AST 21 15   ALT 15 25   ALKPHOS 102 72   BILITOTAL 0.7 0.8     Most Recent Cholesterol Panel:  Recent Labs   Lab Test 02/15/21   TRIG 74     Most Recent 6 Bacteria Isolates From Any Culture (See EPIC Reports for Culture Details):  Recent Labs   Lab Test 11/22/19  0851 10/18/19  0757 10/07/19  1006 09/23/19  1545 09/01/14  0647 09/01/14  0642   CULT <10,000 colonies/mL  mixed urogenital jorge alberto   >100,000 colonies/mL  Proteus vulgaris  * >100,000 colonies/mL  Proteus vulgaris  *  <10,000 colonies/mL  mixed urogenital jorge alberto  Susceptibility testing not routinely done   Culture negative after 4 weeks  No growth No growth No growth     Most Recent TSH and T4:No lab results found.  Most Recent Hemoglobin A1c:  Recent Labs   Lab Test 08/27/14  0627   A1C 5.6     Most Recent 6 glucoses:  Recent Labs   Lab Test 02/15/21 09/23/20  1348 06/08/20  1253 03/04/20  1103 11/22/19  0659 10/18/19  0718   GLC 50* 171* 90 110* 122* 130*        ASSESSMENT/PLAN:  Ingestion on cleaning fluid,  Caustic gastroesophagitis and laryngitis,  Acute hypoxic respiratory failure,  Aspiration pneumonitis (RLL),  GERD.  Ingestion of cleaning fluid due to confusion and not due to suicide attempt per family report.  Patient completed the course of IV Zosyn in the hospital.  Patient is currently n.p.o.  Plan:  Continue pantoprazole 40 mg IV twice daily  Continue TPN (plan for possible G-tube placement on Friday, February 26)  Continue SLP evaluation and therapy to advance diet as tolerated  Continue to closely monitor renal function, electrolytes, and liver panel while on TPN    Encephalopathy.  Suspected due to underlying brain radiation for CNS lymphoma.  Reportedly the patient has become more confused recently.  Plan:  Implement the standard precaution measures for prevention of delirium  Monitor mental status  Cognitive evaluation by OT    Major depression,  PTSD.  Plan:  Continue olanzapine 2.5 mg p.o. at bedtime as well as PRN  In-house psych evaluation and treatment    DM2.  Blood glucose levels are currently in the range of .  There is no recent hemoglobin A1c available.  Plan:  Continue Novolin N 7 units in the morning  Continue sliding-scale NovoLog  Monitor blood glucose  Check Hgb A1c on Feb 23    Acute anemia.  Due to acute illness as well as possibly due to gastroesophagitis.  Hemoglobin was 12.4 on January 19 which gradually trended down to 9.2 on January 26. Last hemoglobin was 8.3 on February 15.  Plan  Monitor hemoglobin periodically    Diffuse large B-cell lymphoma with CNS relapse,  History of seizure.  Initially diagnosed with DLBCL of the cervix in June 2007; treated with 6 cycles of R-CHOP and achieved complete response.  In March 2014, brain MRI revealed right parietal lobe enhancing lesions and biopsy confirmed diffuse large B cell lymphoma. She was treated with methotrexate, temozolomide and rituximab (MT-R) in March 2014 with  subsequent complete clinical and radiographic response in August 2014.  Repeat MRI in September 2019 demonstrated 2 new intracranial masses in the left frontal and right frontal periventricular white matter. WBRT was started in October 2019 followed by Rituxan and dexamethasone.  Brain MRI in October 2020 showed no pathologic enhancement. Most recent MRI on February 3, 2021 also showed no acute abnormality. She follows with Dr. Dailey.  Plan:   Continue to follow-up with oncology as outpatient     Physical deconditioning.  Plan:  Continue PT/OT evaluation and therapy      Orders written by provider at facility:  Check hemoglobin A1c on Feb 23        Total unit/floor time of 60 minutes consisted of the following: examination of patient, reviewing the record including pertinent labs and imaging. More than 50% of this time was spent in coordination of care with the patient, nursing staff, and other healthcare providers. This time was spent on discussing the care plan including management of esophagitis, nutrition, cognitive impairment, discharge/safe placement, and specialty follow up.        Electronically signed by:  Gelacio Castro MD                          Sincerely,        Gelacio Castro MD

## 2021-02-23 ASSESSMENT — MIFFLIN-ST. JEOR: SCORE: 914.16

## 2021-02-24 ENCOUNTER — NURSING HOME VISIT (OUTPATIENT)
Dept: GERIATRICS | Facility: CLINIC | Age: 71
End: 2021-02-24
Payer: COMMERCIAL

## 2021-02-24 ENCOUNTER — RECORDS - HEALTHEAST (OUTPATIENT)
Dept: LAB | Facility: CLINIC | Age: 71
End: 2021-02-24

## 2021-02-24 ENCOUNTER — TRANSFERRED RECORDS (OUTPATIENT)
Dept: HEALTH INFORMATION MANAGEMENT | Facility: CLINIC | Age: 71
End: 2021-02-24

## 2021-02-24 VITALS
TEMPERATURE: 97.9 F | HEIGHT: 60 IN | BODY MASS INDEX: 20.24 KG/M2 | HEART RATE: 73 BPM | SYSTOLIC BLOOD PRESSURE: 109 MMHG | WEIGHT: 103.1 LBS | OXYGEN SATURATION: 91 % | DIASTOLIC BLOOD PRESSURE: 57 MMHG | RESPIRATION RATE: 17 BRPM

## 2021-02-24 DIAGNOSIS — E86.1 HYPOTENSION DUE TO HYPOVOLEMIA: ICD-10-CM

## 2021-02-24 DIAGNOSIS — T14.91XA ATTEMPTED SUICIDE (H): ICD-10-CM

## 2021-02-24 DIAGNOSIS — J69.0 ASPIRATION PNEUMONITIS (H): ICD-10-CM

## 2021-02-24 DIAGNOSIS — F43.10 POST TRAUMATIC STRESS DISORDER: ICD-10-CM

## 2021-02-24 DIAGNOSIS — D64.9 ACUTE ANEMIA: ICD-10-CM

## 2021-02-24 DIAGNOSIS — K29.70 GASTROESOPHAGITIS: ICD-10-CM

## 2021-02-24 DIAGNOSIS — T54.92XD: ICD-10-CM

## 2021-02-24 DIAGNOSIS — K21.9 GASTROESOPHAGEAL REFLUX DISEASE WITHOUT ESOPHAGITIS: ICD-10-CM

## 2021-02-24 DIAGNOSIS — E11.9 TYPE 2 DIABETES MELLITUS WITHOUT COMPLICATION, UNSPECIFIED WHETHER LONG TERM INSULIN USE (H): ICD-10-CM

## 2021-02-24 DIAGNOSIS — K20.90 GASTROESOPHAGITIS: ICD-10-CM

## 2021-02-24 DIAGNOSIS — F43.10 PTSD (POST-TRAUMATIC STRESS DISORDER): ICD-10-CM

## 2021-02-24 DIAGNOSIS — R53.81 PHYSICAL DECONDITIONING: ICD-10-CM

## 2021-02-24 DIAGNOSIS — G93.40 ENCEPHALOPATHY: ICD-10-CM

## 2021-02-24 DIAGNOSIS — R52 PAIN: ICD-10-CM

## 2021-02-24 DIAGNOSIS — B37.0 THRUSH: ICD-10-CM

## 2021-02-24 DIAGNOSIS — C83.390 CNS LYMPHOMA: Primary | ICD-10-CM

## 2021-02-24 DIAGNOSIS — M62.81 GENERALIZED MUSCLE WEAKNESS: ICD-10-CM

## 2021-02-24 DIAGNOSIS — F32.9 MAJOR DEPRESSIVE DISORDER WITH CURRENT ACTIVE EPISODE, UNSPECIFIED DEPRESSION EPISODE SEVERITY, UNSPECIFIED WHETHER RECURRENT: ICD-10-CM

## 2021-02-24 DIAGNOSIS — J96.01 ACUTE RESPIRATORY FAILURE WITH HYPOXIA (H): ICD-10-CM

## 2021-02-24 DIAGNOSIS — G04.00: ICD-10-CM

## 2021-02-24 DIAGNOSIS — J04.0 LARYNGITIS: ICD-10-CM

## 2021-02-24 DIAGNOSIS — F39 UNSPECIFIED MOOD (AFFECTIVE) DISORDER (H): ICD-10-CM

## 2021-02-24 DIAGNOSIS — J96.00 ACUTE RESPIRATORY FAILURE, UNSPECIFIED WHETHER WITH HYPOXIA OR HYPERCAPNIA (H): ICD-10-CM

## 2021-02-24 DIAGNOSIS — R13.10 SWALLOWING IMPAIRMENT: ICD-10-CM

## 2021-02-24 PROBLEM — Z55.0 ILLITERACY: Status: ACTIVE | Noted: 2021-01-31

## 2021-02-24 PROBLEM — S06.9X9A FRACTURE OF OCCIPITAL BONE OF SKULL WITH LOSS OF CONSCIOUSNESS (H): Status: ACTIVE | Noted: 2020-09-13
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
## 2021-02-24 PROBLEM — S06.37AA: Status: ACTIVE | Noted: 2020-09-13

## 2021-02-24 PROBLEM — S02.119A FRACTURE OF OCCIPITAL BONE OF SKULL WITH LOSS OF CONSCIOUSNESS (H): Status: ACTIVE | Noted: 2020-09-13

## 2021-02-24 PROBLEM — Z60.3 LANGUAGE BARRIER AFFECTING HEALTH CARE: Status: ACTIVE | Noted: 2021-01-31

## 2021-02-24 PROBLEM — F33.9 EPISODE OF RECURRENT MAJOR DEPRESSIVE DISORDER (H): Status: ACTIVE | Noted: 2021-01-31

## 2021-02-24 PROBLEM — Z75.8 LANGUAGE BARRIER AFFECTING HEALTH CARE: Status: ACTIVE | Noted: 2021-01-31

## 2021-02-24 PROBLEM — T54.91XA INGESTION OF CORROSIVE CHEMICAL: Status: ACTIVE | Noted: 2021-01-19

## 2021-02-24 LAB
HBA1C MFR BLD: 5.2 %
HBA1C MFR BLD: 5.2 % (ref 0–5.7)
POTASSIUM BLD-SCNC: 3 MMOL/L (ref 3.5–5)
POTASSIUM SERPL-SCNC: 3 MMOL/L (ref 3.5–5)

## 2021-02-24 PROCEDURE — 99309 SBSQ NF CARE MODERATE MDM 30: CPT | Performed by: NURSE PRACTITIONER

## 2021-02-24 RX ORDER — NYSTATIN 100000/ML
500000 SUSPENSION, ORAL (FINAL DOSE FORM) ORAL 4 TIMES DAILY
Qty: 280 ML | Refills: 0 | Status: SHIPPED | OUTPATIENT
Start: 2021-02-24 | End: 2021-03-10

## 2021-02-24 NOTE — LETTER
2/24/2021        RE: Simran Negro  [address]   [facility] [address]        [facility]  [facility] Record Number:  [id number]  Place of Service where encounter took place:  [facility] [[id number]]  Chief Complaint   Patient presents with     Nursing Home Acute       HPI:    Simran Negro  is a 71 year old (1950), who is being seen today for an episodic care visit.  HPI information obtained from: facility chart records, facility staff, patient report, [facility] chart review and Care Everywhere [facility] chart review. Today's concern is:     CNS lymphoma (H)  Ingestion of corrosive chemical, intentional self-harm, subsequent encounter  Aspiration pneumonitis (H)  Acute respiratory failure with hypoxia (H)  Gastroesophagitis  Type 2 diabetes mellitus without complication, unspecified whether long term insulin use (H)  Laryngitis  Encephalopathy  Major depressive disorder with current active episode, unspecified depression episode severity, unspecified whether recurrent  Post traumatic stress disorder  Acute anemia  Physical deconditioning  Hypotension due to hypovolemia  Generalized muscle weakness  Acute respiratory failure, unspecified whether with hypoxia or hypercapnia (H)  Attempted suicide (H)  Unspecified mood (affective) disorder (H)  Pain  Acute disseminated encephalitis and encephalomyelitis  Swallowing impairment  Gastroesophageal reflux disease without esophagitis  PTSD (post-traumatic stress disorder)     Patient does not speak any english. Unable to communicate due to language barriers, aphasia and cognition today. Patient sitting up in wheelchair. On/off drowsiness. Per nursing report patient drowsiness fluctuates. Some days she is alert and awake while others she sleeps most of the day. Remains NPO diet along with TPN needs as directed. Pharmacy dosing TPN needs. Pending GI appt tomorrow to see if patient is able to receive a peg tube for nutritional support.  Family unwilling to bring patient home with TPN needs, but they may be open with tube feedings. IVF stopped on Monday 2/22/21 due to improvement of SBP. Levels have remains stable since discontinuation for now.     BP Readings from Last 3 Encounters:   02/23/21 109/57   02/22/21 115/72   02/15/21 110/64     Wt Readings from Last 5 Encounters:   02/23/21 46.8 kg (103 lb 1.6 oz)   02/22/21 46.1 kg (101 lb 9.6 oz)   02/15/21 50.3 kg (110 lb 14.4 oz)   02/09/21 45.8 kg (101 lb)   09/23/20 48.5 kg (107 lb)     Past Medical and Surgical History reviewed in Epic today.    MEDICATIONS:    Current Outpatient Medications   Medication Sig Dispense Refill     acetaminophen (TYLENOL) 650 MG suppository Place 650 mg rectally every 6 hours as needed       Amino Ac Elect-Calc in D15W (CLINIMIX E 5/15) 5 % SOLN infusion Per Pharmacy dosing:  cyclic x 18 hours. 20 ml x 1 hour, increase to 60 ml x 16 hours, decrease to 20 ml x 1 hour.       insulin aspart (NOVOLOG PEN) 100 UNIT/ML pen Inject Subcutaneous every 6 hours Inject per sliding scale: 120-149 = 2 units; 150-199 = 3 units; 200-249 =  6 units; 250-299 = 9 units; 300-349 = 12 units; 350 or greater = 15 units and call MD if BGL is equal to or greater than 350 after 2 hour recheck       insulin isophane human (HUMULIN N PEN) 100 UNIT/ML injection Inject 7 Units Subcutaneous daily HOLD if Blood Glucose<80 3 mL 11     lipids (INTRALIPID) 20 % infusion Inject 250 mLs into the vein daily       Mouthwashes (BIOTENE DRY MOUTH GENTLE) LIQD Take 5 mLs by mouth 3 times daily AND every 1hours  mL 11     OLANZapine (ZYPREXA) 5 MG tablet Take 0.5 tablets (2.5 mg) by mouth At Bedtime AND 2.5mg daily PRN 30 tablet 4     pantoprazole (PROTONIX) 40 MG injection Inject 40 mg into the vein 2 times daily 60 each 11     sodium chloride 0.9% infusion Inject 100 mL/hr into the vein continuous 3000 mL 11     REVIEW OF SYSTEMS:  Unobtainable secondary to cognitive impairment.  and Unobtainable  secondary to aphasia.     Objective:  /57   Pulse 73   Temp 97.9  F (36.6  C)   Resp 17   Ht 1.524 m (5')   Wt 46.8 kg (103 lb 1.6 oz)   SpO2 91%   BMI 20.14 kg/m    Exam:  GENERAL APPEARANCE:  Alert, somnolent, cooperative  ENT:  Mouth and posterior oropharynx normal, moist mucous membranes, [place]  EYES:  EOM, conjunctivae, lids, pupils and irises normal  NECK:  No adenopathy,masses or thyromegaly  RESP:  respiratory effort and palpation of chest normal, lungs clear to auscultation , no respiratory distress  CV:  Palpation and auscultation of heart done , regular rate and rhythm, no murmur, rub, or gallop, no edema, +2 pedal pulses  ABDOMEN:  normal bowel sounds, soft, nontender, no hepatosplenomegaly or other masses, no guarding or rebound  M/S:   angelina lift transfers. wheelchair bound  SKIN:  Inspection of skin and subcutaneous tissue baseline, Palpation of skin and subcutaneous tissue baseline  NEURO:   Cranial nerves 2-12 are normal tested and grossly at patient's baseline, no purposeful movement in upper and lower extremities  PSYCH:  oriented to self, insight and judgement impaired, memory impaired , affect and mood normal    Labs:     Most Recent 3 CBC's:  Recent Labs   Lab Test 02/22/21 02/15/21 09/23/20  1348   WBC 5.8 4.9 6.0   HGB 7.8* 8.3* 13.3   MCV 86 85 83    293 268     Most Recent 3 BMP's:  Recent Labs   Lab Test 02/22/21 02/15/21 09/23/20  1348   * 146* 130*   POTASSIUM 2.4* 3.3* 3.7   CHLORIDE 114* 115* 95   CO2 24 20* 28   BUN 7* 10 14   CR 0.52* 0.58* 0.84   ANIONGAP 8 11 7   EMMA 7.0* 7.6* 9.2   GLC 69* 50* 171*     Most Recent 2 LFT's:  Recent Labs   Lab Test 02/22/21 02/15/21   AST 13 21   ALT <9 15   ALKPHOS 77 102   BILITOTAL 0.9 0.7     Most Recent 3 INR's:  Recent Labs   Lab Test 09/25/19  1041 09/20/19  1432 09/14/16  1208   INR 1.05 1.00 1.03     Most Recent 6 Bacteria Isolates From Any Culture (See EPIC Reports for Culture Details):  Recent Labs   Lab Test  11/22/19  0851 10/18/19  0757 10/07/19  1006 09/23/19  1545 09/01/14  0647 09/01/14  0642   CULT <10,000 colonies/mL  mixed urogenital jorge alberto   >100,000 colonies/mL  Proteus vulgaris  * >100,000 colonies/mL  Proteus vulgaris  *  <10,000 colonies/mL  mixed urogenital jorge alberto  Susceptibility testing not routinely done   Culture negative after 4 weeks  No growth No growth No growth     Most Recent Hemoglobin A1c:  Recent Labs   Lab Test 08/27/14  0627   A1C 5.6     Most Recent Urinalysis:  Recent Labs   Lab Test 11/22/19  0851   COLOR Yellow   APPEARANCE Slightly Cloudy   URINEGLC Negative   URINEBILI Negative   URINEKETONE Negative   SG 1.012   UBLD Negative   URINEPH 7.0   PROTEIN Negative   NITRITE Negative   LEUKEST Large*   RBCU 1   WBCU 13*     Most Recent Anemia Panel:  Recent Labs   Lab Test 02/22/21   WBC 5.8   HGB 7.8*   HCT 25.4*   MCV 86          ASSESSMENT/PLAN:  (C85.89) CNS lymphoma (H)  (primary encounter diagnosis)  (R53.81) Physical deconditioning  (M62.81) Generalized muscle weakness  Comment: Acute on chronic. S/T below diagnosis  Plan:   -Continue Physical therapy, Occupational therapy and Speech and Language therapy as directed  -SW to remain involve for safe discharge planning needs  -Recommend LTC needs at this time due to current status  -Would recommend home with hospice care and goals if warranted     (J96.00) Acute respiratory failure, unspecified whether with hypoxia or hypercapnia (H)  (J69.0) Aspiration pneumonitis (H)  (T14.91XA) Attempted suicide (H)  (T54.91XD) Accidental poisoning by corrosives and caustics, subsequent encounter  (F43.10) PTSD  Comment: Acute on chronic. NOT AT GOAL Developed a fever with some shortness of breath. She completed course of antibiotics of Zosyn with good relief for pneumonia concerns. Suicidal ideation where patient ingested sulfuric acid (santeen ) on 1/19/21. Mental health documents when speaking to son Richelle, he indicates that  patient does not have any formal psychiatric history. He does note a history of what he identifies as depression in the past when going through chemo but never formally diagnosed. He does report she has been increasingly more forgetful but she has never been tested for a neurocognitive disorder. When asked if he thinks this was a suicide attempt, he strongly disagrees. He believes patient was confused and mistook chemical for something ingestible like soda or water because it was left out on the kitchen counter after son used it to unclog the sink. CT chest on 1/20 shows no evidence of mediastinitis. Laryngoscopy in ED shoed mucosal edema. EGD 1/20- diffuse mucosal changes throughout the entire esophagus. GI had signed off, they recommend outpatient follow up.   Plan:   -Continue Occupational therapy, Occupational therapy and Speech and Language therapy as directed  -Continue PPI IV form BID due to NPO status at this time.   -Monitor respiratory status  -Continue in house psych eval and treatment for ongoing needs  -Would recommend home with hospice care and goals if warranted  -Zyprexa to 2.5mg at HS scheduled along with daily PRN dose as well  -Weekly labs on mondays for: CMP, CBC with diff, magnesium, triglycerides, and phosphorus.      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 NOT AT GOAL. Remains confused and restless often. She was needing to have 1:1 sitters in the hospital which was removed by hospital psych. [facility] but due to NPO status patient is unable to start remeron therefore options very limited. Family declined inpatient psychiatric admission and does not feel this was  "a true suicidal attempt. Per behavior health assessment on 2/2/21: Patient does not remember the events that lead her to be hospitalized. When asked specifically if she drank something to kill herself, she replied \" I would never do that.\" Patient endorses being sad sometimes and worries about things at home. She stated she worries at home because she isn't able to do much as she used to be able to. She reports that she has help all the time between her 2 sons and a PCA. She denies any safety issues at home. When asked about what helps her manage her worries at home, she states her sons keep her very busy.\" Behavioral health assessed patient as LOW RISK. No current suicidal ideation or behaviors that suggest otherwise. Mental health documents when speaking to son Richelle, he indicates that patient does not have any formal psychiatric history. He does note a history of what he identifies as depression in the past when going through chemo but never formally diagnosed. He does report she has been increasingly more forgetful but she has never been tested for a neurocognitive disorder. When asked if he thinks this was a suicide attempt, he strongly disagrees. He believes patient was confused and mistook chemical for something ingestible like soda or water because it was left out on the kitchen counter after son used it to unclog the sink.   Plan:   -Continue house psych eval and treatment  -Continue in house psych to assess neuro-cognition status for ongoing needs  -Would recommend home with hospice care and goals if warranted  -Zyprexa to 2.5mg at HS and daily PRN   -Monitor mood and behaviors  -Tylenol PRN rectal only.   -Monitor for changes in sleeping patterns  -Weekly labs on mondays for: CMP, CBC with diff, magnesium, triglycerides, and phosphorus.      (R13.10) Swallowing impairment  (J04.0) Laryngitis  (K21.9) Gastroesophageal reflux disease without esophagitis  Comment: Acute on chronic S/T ingestion of caustic " substance above. Laryngoscopy in ED showed mucosal edema. GI Advised AGAINST NG or peg tube at this time due to esophagus damage due to acid ingestion and high risk of perforation. GI will see patient in clinic in 1-2 weeks for follow up/dicuss further options with possible peg placement if no progress with PO intake/SLP). GI recommends clear liquids but patient is choking on liquids at this time. Speech recommends continue NPO status. Patient requires significant suctioning after exhibiting wet cough prior to and immediately following initiation of pharyngeal swallow on all PO attempts of NTL via 1/2 tsp bolus by spoon.   Plan:   -Continue Physical therapy and Occupational therapy and Speech and Language therapy as directed  -ADAT if able. Currently remains NPO  -TPN and lipids to be dosed per [facility] pharmacy as directed.   -Would recommend home with hospice care and goals if warranted  -Lipid infusion rate: 20.8ml/hr for 12 hours daily.   -TPN Clinimix E 5% Amino Acid, 15% Dextrose ml/hr (1000 ml daily), cyclic x 18 hours. 20 ml x 1 hour, increase to 60 ml x 16 hours, decrease to 20 ml x 1 hour.   -PICC non-valve catheter cares along with flush with NS 10mL NS before and after IV medications administration followed by heparin 10units/ml- give 5mL. -Maintenenace flush each lumen every 12 hours as well.   -Provide oral care with swab each shift for oral mucosa needs  -Speech and Language therapy allowing/recommending ice chips with licensed staff TID and PRN  -Continue artifical saliva to swish and spit 5mL TID and every hour PRN for mucosal needs.   -Follow up with [facility] in 1-2 weeks as directed. Pending appt scheduled for 2/25/21  -Keep HOB elevated 30 degrees at all times due to aspiration risk.   -Weekly labs on mondays for: CMP, CBC with diff, magnesium, triglycerides, and phosphorus.      (E11.9) Type 2 diabetes mellitus without complication, unspecified whether long term insulin use (H)  Comment: Acute on  chronic. S/T TPN needs. Appears patient has been on s/s insulin as directed along with NPH BID, however evening dose of NPH has been held for the last several days. Blood Glucose values .   Plan:   -Monitor Blood Glucose QID as directed  -Continue ISS as directed for now. Goals to discontinue in near future.   -Discontinue NPH at this time,   -Pending hgb A1c level today.   -Monitor for worsening s/sx of concerns  -Weekly labs on mondays for: CMP, CBC with diff, magnesium, triglycerides, and phosphorus.      (I95.89) Hypotension due to hypovolemia  Comment: Acute on chronic S/T above diagnosis. Previously was on IVF continuous up until admission. No orders to start upon admission to SNF. Blood pressures improved with IVF. Stopped IVF on 2/22/21 due to improvement of BP levels along with lung congestion noted.   Plan:  -Monitor BP and HR as directed  -Stopped IVF due to SBP improvement.   -Monitor for worsening s/sx of concerns  -Weekly labs on mondays for: CMP, CBC with diff, magnesium, triglycerides, and phosphorus.      (C85.89) CNS Lymphoma  Comment: Chronic. The patient is being followed by oncology at [facility].  is aware and will contact [facility] for further plans. Followed by DR Raza. Recommends brain MRI with contrast and will need to be scheduled outpatient.   Plan:  -Follow up with Dr Raza as directed. Will need Brain MRI with contrast prior to appt needs  -Would recommend home with hospice care and goals if warranted  -Monitor for worsening s/sx of concerns  -Weekly labs on mondays for: CMP, CBC with diff, magnesium, triglycerides, and phosphorus.      Orders written by provider at facility     Electronically signed:  Madhuri Cantu DNP, APRN           Sincerely,        Madhuri Cantu, APRN CNP

## 2021-03-01 ENCOUNTER — RECORDS - HEALTHEAST (OUTPATIENT)
Dept: LAB | Facility: CLINIC | Age: 71
End: 2021-03-01

## 2021-03-01 ENCOUNTER — TRANSFERRED RECORDS (OUTPATIENT)
Dept: HEALTH INFORMATION MANAGEMENT | Facility: CLINIC | Age: 71
End: 2021-03-01

## 2021-03-01 DIAGNOSIS — K59.00 CONSTIPATION, UNSPECIFIED CONSTIPATION TYPE: Primary | ICD-10-CM

## 2021-03-01 LAB
ALBUMIN SERPL-MCNC: 3.5 G/DL (ref 3.5–5)
ALP SERPL-CCNC: 118 U/L (ref 45–120)
ALP SERPL-CCNC: 118 U/L (ref 45–120)
ALT SERPL W P-5'-P-CCNC: 18 U/L (ref 0–45)
ALT SERPL-CCNC: 18 U/L (ref 0–45)
APTT PPP: 34 SECONDS (ref 24–37)
AST SERPL W P-5'-P-CCNC: 31 U/L (ref 0–40)
AST SERPL-CCNC: 31 U/L (ref 0–40)
BASOPHILS # BLD AUTO: 0 THOU/UL (ref 0–0.2)
BASOPHILS NFR BLD AUTO: 1 % (ref 0–2)
BILIRUB DIRECT SERPL-MCNC: 0.3 MG/DL
BILIRUB SERPL-MCNC: 0.9 MG/DL (ref 0–1)
BILIRUBIN DIRECT: 0.3 MG/DL
BUN SERPL-MCNC: 24 MG/DL (ref 8–22)
BUN SERPL-MCNC: 24 MG/DL (ref 8–22)
CALCIUM SERPL-MCNC: 9.4 MG/DL (ref 8.5–10.5)
CALCIUM SERPL-MCNC: 9.4 MG/DL (ref 8.5–10.5)
CHLORIDE BLD-SCNC: 104 MMOL/L (ref 98–107)
CHLORIDE SERPLBLD-SCNC: 104 MMOL/L (ref 98–107)
CO2 SERPL-SCNC: 24 MMOL/L (ref 22–31)
CO2 SERPL-SCNC: 24 MMOL/L (ref 22–31)
CREAT SERPL-MCNC: 0.86 MG/DL (ref 0.6–1.1)
CREAT SERPL-MCNC: 0.86 MG/DL (ref 0.6–1.1)
DIFFERENTIAL: ABNORMAL
EOSINOPHIL # BLD AUTO: 0.2 THOU/UL (ref 0–0.4)
EOSINOPHIL NFR BLD AUTO: 2 % (ref 0–6)
ERYTHROCYTE [DISTWIDTH] IN BLOOD BY AUTOMATED COUNT: 13.7 % (ref 11–14.5)
ERYTHROCYTE [DISTWIDTH] IN BLOOD BY AUTOMATED COUNT: 13.7 % (ref 11–14.5)
FASTING STATUS PATIENT QL REPORTED: NORMAL
FASTING STATUS PATIENT QL REPORTED: NORMAL
GFR SERPL CREATININE-BSD FRML MDRD: >60 ML/MIN/1.73M2
GFR SERPL CREATININE-BSD FRML MDRD: >60 ML/MIN/1.73M2
GLUCOSE BLD-MCNC: 81 MG/DL (ref 70–125)
GLUCOSE SERPL-MCNC: 81 MG/DL (ref 70–125)
HCT VFR BLD AUTO: 39 % (ref 35–47)
HCT VFR BLD AUTO: 39 % (ref 35–47)
HEMOGLOBIN: 11.6 G/DL (ref 12–16)
HGB BLD-MCNC: 11.6 G/DL (ref 12–16)
IMM GRANULOCYTES # BLD: 0 THOU/UL
IMM GRANULOCYTES NFR BLD: 0 %
IRON SATN MFR SERPL: 26 % (ref 20–50)
IRON SERPL-MCNC: 59 UG/DL (ref 42–175)
LYMPHOCYTES # BLD AUTO: 3.8 THOU/UL (ref 0.8–4.4)
LYMPHOCYTES NFR BLD AUTO: 43 % (ref 20–40)
MAGNESIUM SERPL-MCNC: 2.1 MG/DL (ref 1.8–2.6)
MCH RBC QN AUTO: 25.4 PG (ref 27–34)
MCH RBC QN AUTO: 25.4 PG (ref 27–34)
MCHC RBC AUTO-ENTMCNC: 29.7 G/DL (ref 32–36)
MCHC RBC AUTO-ENTMCNC: 29.7 G/DL (ref 32–36)
MCV RBC AUTO: 86 FL (ref 80–100)
MCV RBC AUTO: 86 FL (ref 80–100)
MONOCYTES # BLD AUTO: 0.7 THOU/UL (ref 0–0.9)
MONOCYTES NFR BLD AUTO: 7 % (ref 2–10)
NEUTROPHILS # BLD AUTO: 4.1 THOU/UL (ref 2–7.7)
NEUTROPHILS NFR BLD AUTO: 47 % (ref 50–70)
PHOSPHATE SERPL-MCNC: 4.4 MG/DL (ref 2.5–4.5)
PLATELET # BLD AUTO: 282 THOU/UL (ref 140–440)
PLATELET # BLD AUTO: 282 THOU/UL (ref 140–440)
PMV BLD AUTO: 10.7 FL (ref 8.5–12.5)
POTASSIUM BLD-SCNC: 4.1 MMOL/L (ref 3.5–5)
POTASSIUM SERPL-SCNC: 4.1 MMOL/L (ref 3.5–5)
PREALB SERPL-MCNC: 22.2 MG/DL (ref 19–38)
PROT SERPL-MCNC: 6.3 G/DL (ref 6–8)
RBC # BLD AUTO: 4.56 MILL/UL (ref 3.8–5.4)
RBC # BLD AUTO: 4.56 MILL/UL (ref 3.8–5.4)
SODIUM SERPL-SCNC: 142 MMOL/L (ref 136–145)
SODIUM SERPL-SCNC: 142 MMOL/L (ref 136–145)
TIBC SERPL-MCNC: 226 UG/DL (ref 313–563)
TRANSFERRIN SERPL-MCNC: 181 MG/DL (ref 212–360)
TRIGL SERPL-MCNC: 112 MG/DL
TRIGL SERPL-MCNC: 112 MG/DL
WBC # BLD AUTO: 8.8 THOU/UL (ref 4–11)
WBC: 8.8 THOU/UL (ref 4–11)

## 2021-03-01 RX ORDER — BISACODYL 10 MG
10 SUPPOSITORY, RECTAL RECTAL DAILY PRN
Qty: 30 SUPPOSITORY | Refills: 3 | Status: SHIPPED | OUTPATIENT
Start: 2021-03-01

## 2021-03-02 NOTE — PROGRESS NOTES
[place] [facility]  [facility] Medical Record Number:  [id number]  Place of Service where encounter took place:  [facility]  AT [facility] ([facility]) [[id number]]  Chief Complaint   Patient presents with     Nursing Home Acute       HPI:    Simran Negro  is a 71 year old (1950), who is being seen today for an episodic care visit.  HPI information obtained from: facility chart records, facility staff, patient report, [facility] chart review and Care Everywhere [facility] chart review. Today's concern is:     Constipation, unspecified constipation type  CNS lymphoma (H)  Ingestion of corrosive chemical, intentional self-harm, subsequent encounter  Aspiration pneumonitis (H)  Acute respiratory failure with hypoxia (H)  Gastroesophagitis  Type 2 diabetes mellitus without complication, unspecified whether long term insulin use (H)  Laryngitis  Encephalopathy  Major depressive disorder with current active episode, unspecified depression episode severity, unspecified whether recurrent  Post traumatic stress disorder  Acute anemia  Physical deconditioning  Hypotension due to hypovolemia  Generalized muscle weakness  Acute respiratory failure, unspecified whether with hypoxia or hypercapnia (H)  Attempted suicide (H)  Unspecified mood (affective) disorder (H)  Pain  Acute disseminated encephalitis and encephalomyelitis  Swallowing impairment  Gastroesophageal reflux disease without esophagitis  PTSD (post-traumatic stress disorder)  Thrush  Accidental poisoning by corrosives and caustics, subsequent encounter     Patient only speaks hmong language. When does patient speak in hmong language it is very soft whisper which makes interpretor services limited for communication needs. She is alert today sitting in reclining wheelchair. No long on IVF for hypotension concerns in the past. SBP at times can be <90, but this is periodic and most SBP average around 110. Appears to be in no pain. Patient is found occasionally unable to sit  still in the recliner and often is found disrobing herself, despite several attempts to remain clothed. No skin concerns noted. Remains incontinent of B&B. TPN continues with pharmacy dosing per weekly labs. Patient does have GI appt today with hopes of discussion of future options for this patient. Per Speech and Language therapy, she is a poor candidate for oral intake and recommends to remain NPO. GI to discuss if TF peg placement is an option. If TF placement is done, family would be able to assist with these cares at home. They are not able to support for TPN goals. Family and patient may be open to hospice in near future when warranted as well. Patient remains a angelina lift for all transfers as well.     BP Readings from Last 3 Encounters:   03/03/21 104/66   02/23/21 109/57   02/22/21 115/72     Wt Readings from Last 5 Encounters:   03/03/21 42.1 kg (92 lb 14.4 oz)   02/23/21 46.8 kg (103 lb 1.6 oz)   02/22/21 46.1 kg (101 lb 9.6 oz)   02/15/21 50.3 kg (110 lb 14.4 oz)   02/09/21 45.8 kg (101 lb)     Past Medical and Surgical History reviewed in Epic today.    MEDICATIONS:    Current Outpatient Medications   Medication Sig Dispense Refill     acetaminophen (TYLENOL) 650 MG suppository Place 650 mg rectally every 6 hours as needed       Amino Ac Elect-Calc in D15W (CLINIMIX E 5/15) 5 % SOLN infusion Per Pharmacy dosing:  cyclic x 18 hours. 20 ml x 1 hour, increase to 60 ml x 16 hours, decrease to 20 ml x 1 hour.       bisacodyl (DULCOLAX) 10 MG suppository Place 1 suppository (10 mg) rectally daily as needed for constipation 30 suppository 3     insulin aspart (NOVOLOG PEN) 100 UNIT/ML pen Inject Subcutaneous every 6 hours Inject per sliding scale: 120-149 = 2 units; 150-199 = 3 units; 200-249 =  6 units; 250-299 = 9 units; 300-349 = 12 units; 350 or greater = 15 units and call MD if BGL is equal to or greater than 350 after 2 hour recheck       lipids (INTRALIPID) 20 % infusion Inject 250 mLs into the vein  daily       Mouthwashes (BIOTENE DRY MOUTH GENTLE) LIQD Take 5 mLs by mouth 3 times daily AND every 1hours  mL 11     nystatin (MYCOSTATIN) 096464 UNIT/ML suspension Take 5 mLs (500,000 Units) by mouth 4 times daily for 14 days Apply to oral cavity with toothette QID for 2 weeks. 280 mL 0     OLANZapine (ZYPREXA) 5 MG tablet Take 0.5 tablets (2.5 mg) by mouth At Bedtime AND 2.5mg daily PRN 30 tablet 4     pantoprazole (PROTONIX) 40 MG injection Inject 40 mg into the vein 2 times daily 60 each 11     REVIEW OF SYSTEMS:  Unobtainable secondary to cognitive impairment.  and Unobtainable secondary to aphasia.     Objective:  /66   Pulse 97   Temp 97.6  F (36.4  C)   Resp 16   Ht 1.524 m (5')   Wt 42.1 kg (92 lb 14.4 oz)   SpO2 93%   BMI 18.14 kg/m    Exam:  GENERAL APPEARANCE:  Alert, in no distress, thin, cooperative  ENT:  [place], oropharynx clear, throat/mouth:oral mucoasal dry with thrush present to tongue. However thrush has improved this last week with current recommendations  EYES:  EOM, conjunctivae, lids, pupils and irises normal  NECK:  No adenopathy,masses or thyromegaly  RESP:  respiratory effort and palpation of chest normal, lungs clear to auscultation , no respiratory distress  CV:  Palpation and auscultation of heart done , regular rate and rhythm, no murmur, rub, or gallop, no edema, +2 pedal pulses  ABDOMEN:  normal bowel sounds, soft, nontender, no hepatosplenomegaly or other masses, no guarding or rebound  M/S:   angelina transfers. wheelchair bound.   SKIN:  Inspection of skin and subcutaneous tissue baseline, Palpation of skin and subcutaneous tissue baseline  NEURO:   Cranial nerves 2-12 are normal tested and grossly at patient's baseline, no purposeful movement in upper and lower extremities  PSYCH:  oriented to self, insight and judgement impaired, memory impaired , affect and mood normal    Labs:     Most Recent 3 CBC's:  Recent Labs   Lab Test 03/01/21 02/22/21 02/15/21   WBC 8.8  5.8 4.9   HGB 11.6* 7.8* 8.3*   MCV 86 86 85    225 293     Most Recent 3 BMP's:  Recent Labs   Lab Test 03/01/21 02/24/21 02/22/21 02/15/21 09/23/20  1348     --  146* 146* 130*   POTASSIUM 4.1 3.0* 2.4* 3.3* 3.7   CHLORIDE 104  --  114* 115* 95   CO2 24  --  24 20* 28   BUN 24*  --  7* 10 14   CR 0.86  --  0.52* 0.58* 0.84   ANIONGAP  --   --  8 11 7   EMMA 9.4  --  7.0* 7.6* 9.2   GLC 81  --  69* 50* 171*     Most Recent 2 LFT's:  Recent Labs   Lab Test 03/01/21 02/22/21   AST 31 13   ALT 18 <9   ALKPHOS 118 77   BILITOTAL 0.9 0.9     Most Recent 3 INR's:  Recent Labs   Lab Test 09/25/19  1041 09/20/19  1432 09/14/16  1208   INR 1.05 1.00 1.03     Most Recent Cholesterol Panel:  Recent Labs   Lab Test 03/01/21   TRIG 112     Most Recent 6 Bacteria Isolates From Any Culture (See EPIC Reports for Culture Details):  Recent Labs   Lab Test 11/22/19  0851 10/18/19  0757 10/07/19  1006 09/23/19  1545 09/01/14  0647 09/01/14  0642   CULT <10,000 colonies/mL  mixed urogenital jorge alberto   >100,000 colonies/mL  Proteus vulgaris  * >100,000 colonies/mL  Proteus vulgaris  *  <10,000 colonies/mL  mixed urogenital jorge alberto  Susceptibility testing not routinely done   Culture negative after 4 weeks  No growth No growth No growth     Most Recent Hemoglobin A1c:  Recent Labs   Lab Test 02/24/21   A1C 5.2     Most Recent Urinalysis:  Recent Labs   Lab Test 11/22/19  0851   COLOR Yellow   APPEARANCE Slightly Cloudy   URINEGLC Negative   URINEBILI Negative   URINEKETONE Negative   SG 1.012   UBLD Negative   URINEPH 7.0   PROTEIN Negative   NITRITE Negative   LEUKEST Large*   RBCU 1   WBCU 13*     Most Recent Anemia Panel:  Recent Labs   Lab Test 03/01/21   WBC 8.8   HGB 11.6*   HCT 39.0   MCV 86          ASSESSMENT/PLAN:  (C85.89) CNS lymphoma (H)  (primary encounter diagnosis)  (R53.81) Physical deconditioning  (M62.81) Generalized muscle weakness  Comment: Acute on chronic. S/T below diagnosis  Plan:   -Continue  Physical therapy, Occupational therapy and Speech and Language therapy as directed  -SW to remain involve for safe discharge planning needs  -Recommend LTC needs at this time due to current status  -Would recommend home with hospice care and goals if warranted     (J96.00) Acute respiratory failure, unspecified whether with hypoxia or hypercapnia (H)  (J69.0) Aspiration pneumonitis (H)  (T14.91XA) Attempted suicide (H)  (T54.91XD) Accidental poisoning by corrosives and caustics, subsequent encounter  (F43.10) PTSD  Comment: Acute on chronic. NOT AT GOAL Developed a fever with some shortness of breath. She completed course of antibiotics of Zosyn with good relief for pneumonia concerns. Suicidal ideation where patient ingested sulfuric acid (santopentabs ) on 1/19/21. Mental health documents when speaking to son Richelle, he indicates that patient does not have any formal psychiatric history. He does note a history of what he identifies as depression in the past when going through chemo but never formally diagnosed. He does report she has been increasingly more forgetful but she has never been tested for a neurocognitive disorder. When asked if he thinks this was a suicide attempt, he strongly disagrees. He believes patient was confused and mistook chemical for something ingestible like soda or water because it was left out on the kitchen counter after son used it to unclog the sink. CT chest on 1/20 shows no evidence of mediastinitis. Laryngoscopy in ED shoed mucosal edema. EGD 1/20- diffuse mucosal changes throughout the entire esophagus. GI had signed off, they recommend outpatient follow up.   Plan:   -Continue Occupational therapy, Occupational therapy and Speech and Language therapy as directed  -Continue PPI IV form BID due to NPO status at this time.   -Monitor respiratory status  -Continue in house psych eval and treatment for ongoing needs  -Would recommend home with hospice care and goals if  "warranted  -Zyprexa to 2.5mg at HS scheduled along with daily PRN dose as well  -Weekly labs on mondays for: CMP, CBC with diff, magnesium, triglycerides, and phosphorus while on TPN     (T14.91XA) Attempted suicide (H)  (F39) Unspecified mood (affective) disorder (H)  (F32.9) Major depressive disorder with current active episode, unspecified depression episode severity, unspecified whether recurrent  (F43.10) Post traumatic stress disorder  (R52) Pain  (G04.00) Acute disseminated encephalitis and encephalomyelitis  Comment: Acute on chronic. NOT AT GOAL. Remains confused and restless often. She was needing to have 1:1 sitters in the hospital which was removed by hospital psych. [facility] due to [facility]PO status patient is unable to start remeron therefore options very limited. Family declined inpatient psychiatric admission and does not feel this was a true suicidal attempt. Per behavior health assessment on 2/2/21: Patient does not remember the events that lead her to be hospitalized. When asked specifically if she drank something to kill herself, she replied \" I would never do that.\" Patient endorses being sad sometimes and worries about things at home. She stated she worries at home because she isn't able to do much as she used to be able to. She reports that she has help all the time between her 2 sons and a PCA. She denies any safety issues at home. When asked about what helps her manage her worries at home, she states her sons keep her very busy.\" Behavioral health assessed patient as LOW RISK. No current suicidal ideation or behaviors that suggest otherwise. Mental health documents when speaking to son Richelle, he indicates that patient does not have any formal psychiatric history. He does note a history of what he identifies as depression in the past when going through chemo but never formally diagnosed. He does report she has been increasingly more forgetful but she has never been tested for a " neurocognitive disorder. When asked if he thinks this was a suicide attempt, he strongly disagrees. He believes patient was confused and mistook chemical for something ingestible like soda or water because it was left out on the kitchen counter after son used it to unclog the sink.   Plan:   -Continue house psych eval and treatment  -Continue in house psych to assess neuro-cognition status for ongoing needs. Neurology appt scheduled or 3/8/21  -Would recommend home with hospice care and goals if warranted  -Zyprexa to 2.5mg at HS and daily PRN   -Monitor mood and behaviors  -Tylenol PRN rectal only.   -Monitor for changes in sleeping patterns  -Weekly labs on mondays for: CMP, CBC with diff, magnesium, triglycerides, and phosphorus.      (R13.10) Swallowing impairment  (J04.0) Laryngitis  (K21.9) Gastroesophageal reflux disease without esophagitis  (R63.4) Weight loss  Comment: Acute on chronic S/T ingestion of caustic substance above. Laryngoscopy in ED showed mucosal edema. GI Advised AGAINST NG or peg tube at this time due to esophagus damage due to acid ingestion and high risk of perforation. GI will see patient in clinic in 1-2 weeks for follow up/dicuss further options with possible peg placement if no progress with PO intake/SLP). GI recommends clear liquids but patient is choking on liquids at this time. Speech recommends continue NPO status. Patient requires significant suctioning after exhibiting wet cough prior to and immediately following initiation of pharyngeal swallow on all PO attempts of NTL via 1/2 tsp bolus by spoon. Weight loss noted. Admit weight 101#, most recent weight 92.9#. Per Speech and Language therapy recommendations, ice chips very difficult for patient along with any food or fluids- they strongly recommendation to continue NPO status.   Plan:   -Continue Physical therapy and Occupational therapy and Speech and Language therapy as directed  -ADAT if able. Currently remains NPO  -TPN and  lipids to be dosed per [facility] as directed.   -Would recommend home with hospice care and goals if warranted  -Lipid infusion rate: 20.8ml/hr for 12 hours daily.   -TPN Clinimix E 5% Amino Acid, 15% Dextrose ml/hr (1000 ml daily), cyclic x 18 hours. 20 ml x 1 hour, increase to 60 ml x 16 hours, decrease to 20 ml x 1 hour.   -PICC non-valve catheter cares along with flush with NS 10mL NS before and after IV medications administration followed by heparin 10units/ml- give 5mL. -Maintenenace flush each lumen every 12 hours as well.   -Provide oral care with swab each shift for oral mucosa needs  -Speech and Language therapy allowing/recommending ice chips with licensed staff TID and PRN  -Continue artifical saliva to swish and spit 5mL TID and every hour PRN for mucosal needs.   -Follow up with [facility] as directed. Pending appt today scheduled for 3/4/21  -Keep HOB elevated 30 degrees at all times due to aspiration risk.   -Weekly labs on mondays for: CMP, CBC with diff, magnesium, triglycerides, and phosphorus.     Addendum 3/4/21 @ 1300: Patient came back from GI appt with PICC line out of arm. Due to TPN continuous need, will need to remove and replace PICC today with [facility] IV team tonight 3/4/21. May resume TPN per directions once PICC line replaced. May need to complete CXR to confirm placement per IV Registered Nurse. Per GI recommendations, swallow study and EGD will be ordered and scheduled soon.      (E11.9) Type 2 diabetes mellitus without complication, unspecified whether long term insulin use (H)  Comment: Acute on chronic. S/T TPN needs. Appears patient has been on s/s insulin as directed along with NPH BID, however evening dose of NPH has been held for the last several days. Blood Glucose values . Recent A1c~5.2% in feb 2021  Plan:   -Reduce Blood Glucose monitoring to BID for 2 weeks then discontinue  -Discontinue s/s insulin  -Discontinued NPH   -Monitor for worsening s/sx of  concerns  -Weekly labs on mondays for: CMP, CBC with diff, magnesium, triglycerides, and phosphorus.      (I95.89) Hypotension due to hypovolemia  Comment: Acute on chronic S/T above diagnosis. Previously was on IVF continuous up until admission. No orders to start upon admission to SNF. Blood pressures improved with IVF. Stopped IVF on 2/22/21 due to improvement of BP levels along with lung congestion noted.   Plan:  -Monitor BP and HR as directed  -Stopped IVF due to SBP improvement.   -Monitor for worsening s/sx of concerns  -Weekly labs on mondays for: CMP, CBC with diff, magnesium, triglycerides, and phosphorus.      (C85.89) CNS Lymphoma  Comment: Chronic. The patient is being followed by oncology at [facility].  is aware and will contact [facility] for further plans. Followed by DR Raza. Recommends brain MRI with contrast and will need to be scheduled outpatient.   Plan:  -Follow up with Dr Raza as directed. Will need Brain MRI with contrast prior to appt needs. Pending appt at this time.   -Would recommend home with hospice care and goals if warranted  -Monitor for worsening s/sx of concerns  -Weekly labs on mondays for: CMP, CBC with diff, magnesium, triglycerides, and phosphorus.      (B37.0) Thrush  Comment: Acute on chronic. Suspect due to limited oral intake with suspicion that staff not providing oral cares as directed  Plan:  -Staff to provide good oral cares every shift as directed  -Continue nystatin QID for 2 weeks. Staff to apply with toothette to oral cavity and tongue as directed.   -Ok for Ice chips with staff as directed  -Monitor for worsening s/sx of concerns.     (K59.00) Constipation  Comment: Acute on chronic.   Plan:  -Continue bisacodyl as directed  -Continue enema as directed  -Monitor BM patterns     Orders written by provider at facility     Electronically signed:  Madhuri Cantu DNP, APRN

## 2021-03-03 ENCOUNTER — PRE VISIT (OUTPATIENT)
Dept: NEUROLOGY | Facility: CLINIC | Age: 71
End: 2021-03-03

## 2021-03-03 ASSESSMENT — MIFFLIN-ST. JEOR: SCORE: 867.89

## 2021-03-03 NOTE — TELEPHONE ENCOUNTER
FUTURE VISIT INFORMATION      FUTURE VISIT INFORMATION:    Date: 3/8/2021    Time: 1015am    Location: [facility]   REFERRAL INFORMATION:    Referring provider:  Dr. Flor     Referring providers clinic:  [facility]     Reason for visit/diagnosis  Brain Tumor     RECORDS REQUESTED FROM:       Clinic name Comments Records Status Imaging Status   [facility]  Dr. Flor-1/5/2021 Care Everywhere N/A          [facility] CT Head-7/7/2020, 9/13/2020, 9/14/2020    CT Cervical Spine-9/13/2020    CT Thoracic Spine-9/14/2020    CT Lumbar Spine-9/14/2020    MRI Brain-10/14/2020, 2/3/2021 Care Everywhere Requested to PACS          Internal MR Brain-5/13/2016, 8/21/2017, 8/31/2018, 9/19/2019, 10/18/2019, 12/27/2019, 6/8/2020 Epic PACS            3/3/2021-Request for images faxed to [facility]-MR @ 230pm    3/8/2021-MRI Brain 2/3/2021-Received and now in PACS. 2nd request faxed to [facility] for all 2020 Images-MR @ 514am

## 2021-03-04 ENCOUNTER — TELEPHONE (OUTPATIENT)
Dept: GERIATRICS | Facility: CLINIC | Age: 71
End: 2021-03-04

## 2021-03-04 ENCOUNTER — NURSING HOME VISIT (OUTPATIENT)
Dept: GERIATRICS | Facility: CLINIC | Age: 71
End: 2021-03-04
Payer: COMMERCIAL

## 2021-03-04 VITALS
HEIGHT: 60 IN | HEART RATE: 97 BPM | BODY MASS INDEX: 18.24 KG/M2 | DIASTOLIC BLOOD PRESSURE: 66 MMHG | RESPIRATION RATE: 16 BRPM | SYSTOLIC BLOOD PRESSURE: 104 MMHG | OXYGEN SATURATION: 93 % | WEIGHT: 92.9 LBS | TEMPERATURE: 97.6 F

## 2021-03-04 DIAGNOSIS — G93.40 ENCEPHALOPATHY: ICD-10-CM

## 2021-03-04 DIAGNOSIS — R13.10 SWALLOWING IMPAIRMENT: ICD-10-CM

## 2021-03-04 DIAGNOSIS — E86.1 HYPOTENSION DUE TO HYPOVOLEMIA: ICD-10-CM

## 2021-03-04 DIAGNOSIS — J69.0 ASPIRATION PNEUMONITIS (H): ICD-10-CM

## 2021-03-04 DIAGNOSIS — K29.70 GASTROESOPHAGITIS: ICD-10-CM

## 2021-03-04 DIAGNOSIS — T54.92XD: ICD-10-CM

## 2021-03-04 DIAGNOSIS — F43.10 PTSD (POST-TRAUMATIC STRESS DISORDER): ICD-10-CM

## 2021-03-04 DIAGNOSIS — C83.390 CNS LYMPHOMA: ICD-10-CM

## 2021-03-04 DIAGNOSIS — K20.90 GASTROESOPHAGITIS: ICD-10-CM

## 2021-03-04 DIAGNOSIS — G04.00: ICD-10-CM

## 2021-03-04 DIAGNOSIS — R52 PAIN: ICD-10-CM

## 2021-03-04 DIAGNOSIS — K59.00 CONSTIPATION, UNSPECIFIED CONSTIPATION TYPE: Primary | ICD-10-CM

## 2021-03-04 DIAGNOSIS — B37.0 THRUSH: ICD-10-CM

## 2021-03-04 DIAGNOSIS — J96.00 ACUTE RESPIRATORY FAILURE, UNSPECIFIED WHETHER WITH HYPOXIA OR HYPERCAPNIA (H): ICD-10-CM

## 2021-03-04 DIAGNOSIS — F32.9 MAJOR DEPRESSIVE DISORDER WITH CURRENT ACTIVE EPISODE, UNSPECIFIED DEPRESSION EPISODE SEVERITY, UNSPECIFIED WHETHER RECURRENT: ICD-10-CM

## 2021-03-04 DIAGNOSIS — E11.9 TYPE 2 DIABETES MELLITUS WITHOUT COMPLICATION, UNSPECIFIED WHETHER LONG TERM INSULIN USE (H): ICD-10-CM

## 2021-03-04 DIAGNOSIS — J96.01 ACUTE RESPIRATORY FAILURE WITH HYPOXIA (H): ICD-10-CM

## 2021-03-04 DIAGNOSIS — T14.91XA ATTEMPTED SUICIDE (H): ICD-10-CM

## 2021-03-04 DIAGNOSIS — F39 UNSPECIFIED MOOD (AFFECTIVE) DISORDER (H): ICD-10-CM

## 2021-03-04 DIAGNOSIS — D64.9 ACUTE ANEMIA: ICD-10-CM

## 2021-03-04 DIAGNOSIS — M62.81 GENERALIZED MUSCLE WEAKNESS: ICD-10-CM

## 2021-03-04 DIAGNOSIS — R53.81 PHYSICAL DECONDITIONING: ICD-10-CM

## 2021-03-04 DIAGNOSIS — F43.10 POST TRAUMATIC STRESS DISORDER: ICD-10-CM

## 2021-03-04 DIAGNOSIS — K21.9 GASTROESOPHAGEAL REFLUX DISEASE WITHOUT ESOPHAGITIS: ICD-10-CM

## 2021-03-04 DIAGNOSIS — T54.91XD: ICD-10-CM

## 2021-03-04 DIAGNOSIS — R63.4 WEIGHT LOSS: ICD-10-CM

## 2021-03-04 DIAGNOSIS — J04.0 LARYNGITIS: ICD-10-CM

## 2021-03-04 PROCEDURE — 99309 SBSQ NF CARE MODERATE MDM 30: CPT | Performed by: NURSE PRACTITIONER

## 2021-03-04 NOTE — LETTER
3/4/2021        RE: Simran Negro  [address]   [facility] [address]        [facility]  [facility] Record Number:  [id number]  Place of Service where encounter took place:  [facility]  AT [facility] ([facility]) [[id number]]  Chief Complaint   Patient presents with     Nursing Home Acute       HPI:    Simran Negro  is a 71 year old (1950), who is being seen today for an episodic care visit.  HPI information obtained from: facility chart records, facility staff, patient report, [facility] chart review and Care Everywhere [facility] chart review. Today's concern is:     Constipation, unspecified constipation type  CNS lymphoma (H)  Ingestion of corrosive chemical, intentional self-harm, subsequent encounter  Aspiration pneumonitis (H)  Acute respiratory failure with hypoxia (H)  Gastroesophagitis  Type 2 diabetes mellitus without complication, unspecified whether long term insulin use (H)  Laryngitis  Encephalopathy  Major depressive disorder with current active episode, unspecified depression episode severity, unspecified whether recurrent  Post traumatic stress disorder  Acute anemia  Physical deconditioning  Hypotension due to hypovolemia  Generalized muscle weakness  Acute respiratory failure, unspecified whether with hypoxia or hypercapnia (H)  Attempted suicide (H)  Unspecified mood (affective) disorder (H)  Pain  Acute disseminated encephalitis and encephalomyelitis  Swallowing impairment  Gastroesophageal reflux disease without esophagitis  PTSD (post-traumatic stress disorder)  Thrush  Accidental poisoning by corrosives and caustics, subsequent encounter     Patient only speaks hmong language. When does patient speak in hmong language it is very soft whisper which makes interpretor services limited for communication needs. She is alert today sitting in reclining wheelchair. No long on IVF for hypotension concerns in the past. SBP at times can be <90, but this is periodic and most SBP average  around 110. Appears to be in no pain. Patient is found occasionally unable to sit still in the recliner and often is found disrobing herself, despite several attempts to remain clothed. No skin concerns noted. Remains incontinent of B&B. TPN continues with pharmacy dosing per weekly labs. Patient does have GI appt today with hopes of discussion of future options for this patient. Per Speech and Language therapy, she is a poor candidate for oral intake and recommends to remain NPO. GI to discuss if TF peg placement is an option. If TF placement is done, family would be able to assist with these cares at home. They are not able to support for TPN goals. Family and patient may be open to hospice in near future when warranted as well. Patient remains a angelina lift for all transfers as well.     BP Readings from Last 3 Encounters:   03/03/21 104/66   02/23/21 109/57   02/22/21 115/72     Wt Readings from Last 5 Encounters:   03/03/21 42.1 kg (92 lb 14.4 oz)   02/23/21 46.8 kg (103 lb 1.6 oz)   02/22/21 46.1 kg (101 lb 9.6 oz)   02/15/21 50.3 kg (110 lb 14.4 oz)   02/09/21 45.8 kg (101 lb)     Past Medical and Surgical History reviewed in Epic today.    MEDICATIONS:    Current Outpatient Medications   Medication Sig Dispense Refill     acetaminophen (TYLENOL) 650 MG suppository Place 650 mg rectally every 6 hours as needed       Amino Ac Elect-Calc in D15W (CLINIMIX E 5/15) 5 % SOLN infusion Per Pharmacy dosing:  cyclic x 18 hours. 20 ml x 1 hour, increase to 60 ml x 16 hours, decrease to 20 ml x 1 hour.       bisacodyl (DULCOLAX) 10 MG suppository Place 1 suppository (10 mg) rectally daily as needed for constipation 30 suppository 3     insulin aspart (NOVOLOG PEN) 100 UNIT/ML pen Inject Subcutaneous every 6 hours Inject per sliding scale: 120-149 = 2 units; 150-199 = 3 units; 200-249 =  6 units; 250-299 = 9 units; 300-349 = 12 units; 350 or greater = 15 units and call MD if BGL is equal to or greater than 350 after 2  hour recheck       lipids (INTRALIPID) 20 % infusion Inject 250 mLs into the vein daily       Mouthwashes (BIOTENE DRY MOUTH GENTLE) LIQD Take 5 mLs by mouth 3 times daily AND every 1hours  mL 11     nystatin (MYCOSTATIN) 559700 UNIT/ML suspension Take 5 mLs (500,000 Units) by mouth 4 times daily for 14 days Apply to oral cavity with toothette QID for 2 weeks. 280 mL 0     OLANZapine (ZYPREXA) 5 MG tablet Take 0.5 tablets (2.5 mg) by mouth At Bedtime AND 2.5mg daily PRN 30 tablet 4     pantoprazole (PROTONIX) 40 MG injection Inject 40 mg into the vein 2 times daily 60 each 11     REVIEW OF SYSTEMS:  Unobtainable secondary to cognitive impairment.  and Unobtainable secondary to aphasia.     Objective:  /66   Pulse 97   Temp 97.6  F (36.4  C)   Resp 16   Ht 1.524 m (5')   Wt 42.1 kg (92 lb 14.4 oz)   SpO2 93%   BMI 18.14 kg/m    Exam:  GENERAL APPEARANCE:  Alert, in no distress, thin, cooperative  ENT:  [place], oropharynx clear, throat/mouth:oral mucoasal dry with thrush present to tongue. However thrush has improved this last week with current recommendations  EYES:  EOM, conjunctivae, lids, pupils and irises normal  NECK:  No adenopathy,masses or thyromegaly  RESP:  respiratory effort and palpation of chest normal, lungs clear to auscultation , no respiratory distress  CV:  Palpation and auscultation of heart done , regular rate and rhythm, no murmur, rub, or gallop, no edema, +2 pedal pulses  ABDOMEN:  normal bowel sounds, soft, nontender, no hepatosplenomegaly or other masses, no guarding or rebound  M/S:   angelina transfers. wheelchair bound.   SKIN:  Inspection of skin and subcutaneous tissue baseline, Palpation of skin and subcutaneous tissue baseline  NEURO:   Cranial nerves 2-12 are normal tested and grossly at patient's baseline, no purposeful movement in upper and lower extremities  PSYCH:  oriented to self, insight and judgement impaired, memory impaired , affect and mood normal    Labs:      Most Recent 3 CBC's:  Recent Labs   Lab Test 03/01/21 02/22/21 02/15/21   WBC 8.8 5.8 4.9   HGB 11.6* 7.8* 8.3*   MCV 86 86 85    225 293     Most Recent 3 BMP's:  Recent Labs   Lab Test 03/01/21 02/24/21 02/22/21 02/15/21 09/23/20  1348     --  146* 146* 130*   POTASSIUM 4.1 3.0* 2.4* 3.3* 3.7   CHLORIDE 104  --  114* 115* 95   CO2 24  --  24 20* 28   BUN 24*  --  7* 10 14   CR 0.86  --  0.52* 0.58* 0.84   ANIONGAP  --   --  8 11 7   EMMA 9.4  --  7.0* 7.6* 9.2   GLC 81  --  69* 50* 171*     Most Recent 2 LFT's:  Recent Labs   Lab Test 03/01/21 02/22/21   AST 31 13   ALT 18 <9   ALKPHOS 118 77   BILITOTAL 0.9 0.9     Most Recent 3 INR's:  Recent Labs   Lab Test 09/25/19  1041 09/20/19  1432 09/14/16  1208   INR 1.05 1.00 1.03     Most Recent Cholesterol Panel:  Recent Labs   Lab Test 03/01/21   TRIG 112     Most Recent 6 Bacteria Isolates From Any Culture (See EPIC Reports for Culture Details):  Recent Labs   Lab Test 11/22/19  0851 10/18/19  0757 10/07/19  1006 09/23/19  1545 09/01/14  0647 09/01/14  0642   CULT <10,000 colonies/mL  mixed urogenital jorge alberto   >100,000 colonies/mL  Proteus vulgaris  * >100,000 colonies/mL  Proteus vulgaris  *  <10,000 colonies/mL  mixed urogenital jorge alberto  Susceptibility testing not routinely done   Culture negative after 4 weeks  No growth No growth No growth     Most Recent Hemoglobin A1c:  Recent Labs   Lab Test 02/24/21   A1C 5.2     Most Recent Urinalysis:  Recent Labs   Lab Test 11/22/19  0851   COLOR Yellow   APPEARANCE Slightly Cloudy   URINEGLC Negative   URINEBILI Negative   URINEKETONE Negative   SG 1.012   UBLD Negative   URINEPH 7.0   PROTEIN Negative   NITRITE Negative   LEUKEST Large*   RBCU 1   WBCU 13*     Most Recent Anemia Panel:  Recent Labs   Lab Test 03/01/21   WBC 8.8   HGB 11.6*   HCT 39.0   MCV 86          ASSESSMENT/PLAN:  (C85.89) CNS lymphoma (H)  (primary encounter diagnosis)  (R53.81) Physical deconditioning  (M62.81) Generalized  muscle weakness  Comment: Acute on chronic. S/T below diagnosis  Plan:   -Continue Physical therapy, Occupational therapy and Speech and Language therapy as directed  -SW to remain involve for safe discharge planning needs  -Recommend LTC needs at this time due to current status  -Would recommend home with hospice care and goals if warranted     (J96.00) Acute respiratory failure, unspecified whether with hypoxia or hypercapnia (H)  (J69.0) Aspiration pneumonitis (H)  (T14.91XA) Attempted suicide (H)  (T54.91XD) Accidental poisoning by corrosives and caustics, subsequent encounter  (F43.10) PTSD  Comment: Acute on chronic. NOT AT GOAL Developed a fever with some shortness of breath. She completed course of antibiotics of Zosyn with good relief for pneumonia concerns. Suicidal ideation where patient ingested sulfuric acid (santeen ) on 1/19/21. Mental health documents when speaking to mari Peoples, he indicates that patient does not have any formal psychiatric history. He does note a history of what he identifies as depression in the past when going through chemo but never formally diagnosed. He does report she has been increasingly more forgetful but she has never been tested for a neurocognitive disorder. When asked if he thinks this was a suicide attempt, he strongly disagrees. He believes patient was confused and mistook chemical for something ingestible like soda or water because it was left out on the kitchen counter after son used it to unclog the sink. CT chest on 1/20 shows no evidence of mediastinitis. Laryngoscopy in ED shoed mucosal edema. EGD 1/20- diffuse mucosal changes throughout the entire esophagus. GI had signed off, they recommend outpatient follow up.   Plan:   -Continue Occupational therapy, Occupational therapy and Speech and Language therapy as directed  -Continue PPI IV form BID due to NPO status at this time.   -Monitor respiratory status  -Continue in house psych eval and treatment  "for ongoing needs  -Would recommend home with hospice care and goals if warranted  -Zyprexa to 2.5mg at HS scheduled along with daily PRN dose as well  -Weekly labs on mondays for: CMP, CBC with diff, magnesium, triglycerides, and phosphorus while on TPN     (T14.91XA) Attempted suicide (H)  (F39) Unspecified mood (affective) disorder (H)  (F32.9) Major depressive disorder with current active episode, unspecified depression episode severity, unspecified whether recurrent  (F43.10) Post traumatic stress disorder  (R52) Pain  (G04.00) Acute disseminated encephalitis and encephalomyelitis  Comment: Acute on chronic. NOT AT GOAL. Remains confused and restless often. She was needing to have 1:1 sitters in the hospital which was removed by hospital psych. [facility] but due to NPO status patient is unable to start remeron therefore options very limited. Family declined inpatient psychiatric admission and does not feel this was a true suicidal attempt. Per behavior health assessment on 2/2/21: Patient does not remember the events that lead her to be hospitalized. When asked specifically if she drank something to kill herself, she replied \" I would never do that.\" Patient endorses being sad sometimes and worries about things at home. She stated she worries at home because she isn't able to do much as she used to be able to. She reports that she has help all the time between her 2 sons and a PCA. She denies any safety issues at home. When asked about what helps her manage her worries at home, she states her sons keep her very busy.\" Behavioral health assessed patient as LOW RISK. No current suicidal ideation or behaviors that suggest otherwise. Mental health documents when speaking to son Richelle, he indicates that patient does not have any formal psychiatric history. He does note a history of what he identifies as depression in the past when going through chemo but never formally diagnosed. He does report she has " been increasingly more forgetful but she has never been tested for a neurocognitive disorder. When asked if he thinks this was a suicide attempt, he strongly disagrees. He believes patient was confused and mistook chemical for something ingestible like soda or water because it was left out on the kitchen counter after son used it to unclog the sink.   Plan:   -Continue house psych eval and treatment  -Continue in house psych to assess neuro-cognition status for ongoing needs. Neurology appt scheduled or 3/8/21  -Would recommend home with hospice care and goals if warranted  -Zyprexa to 2.5mg at HS and daily PRN   -Monitor mood and behaviors  -Tylenol PRN rectal only.   -Monitor for changes in sleeping patterns  -Weekly labs on mondays for: CMP, CBC with diff, magnesium, triglycerides, and phosphorus.      (R13.10) Swallowing impairment  (J04.0) Laryngitis  (K21.9) Gastroesophageal reflux disease without esophagitis  (R63.4) Weight loss  Comment: Acute on chronic S/T ingestion of caustic substance above. Laryngoscopy in ED showed mucosal edema. GI Advised AGAINST NG or peg tube at this time due to esophagus damage due to acid ingestion and high risk of perforation. GI will see patient in clinic in 1-2 weeks for follow up/dicuss further options with possible peg placement if no progress with PO intake/SLP). GI recommends clear liquids but patient is choking on liquids at this time. Speech recommends continue NPO status. Patient requires significant suctioning after exhibiting wet cough prior to and immediately following initiation of pharyngeal swallow on all PO attempts of NTL via 1/2 tsp bolus by spoon. Weight loss noted. Admit weight 101#, most recent weight 92.9#.   Plan:   -Continue Physical therapy and Occupational therapy and Speech and Language therapy as directed  -ADAT if able. Currently remains NPO  -TPN and lipids to be dosed per [facility] pharmacy as directed.   -Would recommend home with hospice care and  goals if warranted  -Lipid infusion rate: 20.8ml/hr for 12 hours daily.   -TPN Clinimix E 5% Amino Acid, 15% Dextrose ml/hr (1000 ml daily), cyclic x 18 hours. 20 ml x 1 hour, increase to 60 ml x 16 hours, decrease to 20 ml x 1 hour.   -PICC non-valve catheter cares along with flush with NS 10mL NS before and after IV medications administration followed by heparin 10units/ml- give 5mL. -Maintenenace flush each lumen every 12 hours as well.   -Provide oral care with swab each shift for oral mucosa needs  -Speech and Language therapy allowing/recommending ice chips with licensed staff TID and PRN  -Continue artifical saliva to swish and spit 5mL TID and every hour PRN for mucosal needs.   -Follow up with [facility] as directed. Pending appt today scheduled for 3/4/21  -Keep HOB elevated 30 degrees at all times due to aspiration risk.   -Weekly labs on mondays for: CMP, CBC with diff, magnesium, triglycerides, and phosphorus.      (E11.9) Type 2 diabetes mellitus without complication, unspecified whether long term insulin use (H)  Comment: Acute on chronic. S/T TPN needs. Appears patient has been on s/s insulin as directed along with NPH BID, however evening dose of NPH has been held for the last several days. Blood Glucose values . Recent A1c~5.2% in feb 2021  Plan:   -Reduce Blood Glucose monitoring to BID for 2 weeks then discontinue  -Discontinue s/s insulin  -Discontinued NPH   -Monitor for worsening s/sx of concerns  -Weekly labs on mondays for: CMP, CBC with diff, magnesium, triglycerides, and phosphorus.      (I95.89) Hypotension due to hypovolemia  Comment: Acute on chronic S/T above diagnosis. Previously was on IVF continuous up until admission. No orders to start upon admission to SNF. Blood pressures improved with IVF. Stopped IVF on 2/22/21 due to improvement of BP levels along with lung congestion noted.   Plan:  -Monitor BP and HR as directed  -Stopped IVF due to SBP improvement.   -Monitor for worsening  s/sx of concerns  -Weekly labs on mondays for: CMP, CBC with diff, magnesium, triglycerides, and phosphorus.      (C85.89) CNS Lymphoma  Comment: Chronic. The patient is being followed by oncology at [facility].  is aware and will contact [facility] for further plans. Followed by DR Raza. Recommends brain MRI with contrast and will need to be scheduled outpatient.   Plan:  -Follow up with Dr Raza as directed. Will need Brain MRI with contrast prior to appt needs. Pending appt at this time.   -Would recommend home with hospice care and goals if warranted  -Monitor for worsening s/sx of concerns  -Weekly labs on mondays for: CMP, CBC with diff, magnesium, triglycerides, and phosphorus.      (B37.0) Thrush  Comment: Acute on chronic. Suspect due to limited oral intake with suspicion that staff not providing oral cares as directed  Plan:  -Staff to provide good oral cares every shift as directed  -Continue nystatin QID for 2 weeks. Staff to apply with toothette to oral cavity and tongue as directed.   -Ok for Ice chips with staff as directed  -Monitor for worsening s/sx of concerns.     (K59.00) Constipation  Comment: Acute on chronic.   Plan:  -Continue bisacodyl as directed  -Continue enema as directed  -Monitor BM patterns     Orders written by provider at facility     Electronically signed:  Madhuri Cantu DNP, APRN        Sincerely,        Madhuri Cantu, APRN CNP

## 2021-03-05 NOTE — TELEPHONE ENCOUNTER
[facility] TELEPHONE ENCOUNTER        Simran Negro is a 71 year old  (1950),Nurse called today to report: Patient pulled out PICC line. IV nurse unable to restart after lengthy attempt. Patient requires for TPN, Lipids and is NPO. Nurse requesting to send in to ED to get IV access restarted.     ASSESSMENT/PLAN  No CL access in patient dependent on TPN/Lipids  - IV nurse unable to restart in facility    ORDERS    OK to send to ED as above      Electronically signed by:   ARIEL Sol CNP

## 2021-03-28 ENCOUNTER — HEALTH MAINTENANCE LETTER (OUTPATIENT)
Age: 71
End: 2021-03-28

## 2021-07-18 ENCOUNTER — HEALTH MAINTENANCE LETTER (OUTPATIENT)
Age: 71
End: 2021-07-18

## 2021-09-12 ENCOUNTER — HEALTH MAINTENANCE LETTER (OUTPATIENT)
Age: 71
End: 2021-09-12

## 2021-11-07 ENCOUNTER — HEALTH MAINTENANCE LETTER (OUTPATIENT)
Age: 71
End: 2021-11-07

## 2024-06-17 PROBLEM — Z76.89 HEALTH CARE HOME: Status: RESOLVED | Noted: 2017-02-17 | Resolved: 2024-06-17

## 2024-09-01 ENCOUNTER — CONTACT MOVED (OUTPATIENT)
Age: 74
End: 2024-09-01

## 2024-09-03 ENCOUNTER — CONTACT MOVED (OUTPATIENT)
Age: 74
End: 2024-09-03